# Patient Record
Sex: FEMALE | Race: WHITE | NOT HISPANIC OR LATINO | Employment: OTHER | URBAN - NONMETROPOLITAN AREA
[De-identification: names, ages, dates, MRNs, and addresses within clinical notes are randomized per-mention and may not be internally consistent; named-entity substitution may affect disease eponyms.]

---

## 2017-01-10 ENCOUNTER — TELEPHONE (OUTPATIENT)
Dept: UROLOGY | Facility: CLINIC | Age: 67
End: 2017-01-10

## 2017-01-10 DIAGNOSIS — N20.1 CALCULUS OF URETER: Primary | ICD-10-CM

## 2017-01-10 NOTE — TELEPHONE ENCOUNTER
----- Message from Becky Beard sent at 1/10/2017  9:39 AM CST -----  Contact: OPAL GERBER  Needs order for KUB on 2/16/17        Waiting on Dr. Ferreira to sign off on order. ML

## 2017-02-16 ENCOUNTER — OFFICE VISIT (OUTPATIENT)
Dept: UROLOGY | Facility: CLINIC | Age: 67
End: 2017-02-16

## 2017-02-16 ENCOUNTER — HOSPITAL ENCOUNTER (OUTPATIENT)
Dept: GENERAL RADIOLOGY | Facility: HOSPITAL | Age: 67
Discharge: HOME | End: 2017-02-16
Attending: UROLOGY | Admitting: UROLOGY

## 2017-02-16 VITALS
TEMPERATURE: 98.4 F | BODY MASS INDEX: 34.56 KG/M2 | WEIGHT: 187.8 LBS | HEIGHT: 62 IN | SYSTOLIC BLOOD PRESSURE: 150 MMHG | DIASTOLIC BLOOD PRESSURE: 96 MMHG

## 2017-02-16 DIAGNOSIS — N20.1 CALCULUS OF URETER: ICD-10-CM

## 2017-02-16 DIAGNOSIS — N20.1 CALCULUS OF URETER: Primary | ICD-10-CM

## 2017-02-16 DIAGNOSIS — R31.29 MICROSCOPIC HEMATURIA: ICD-10-CM

## 2017-02-16 LAB
BILIRUB BLD-MCNC: NEGATIVE MG/DL
CLARITY, POC: CLEAR
COLOR UR: YELLOW
GLUCOSE UR STRIP-MCNC: NEGATIVE MG/DL
KETONES UR QL: NEGATIVE
LEUKOCYTE EST, POC: NEGATIVE
NITRITE UR-MCNC: NEGATIVE MG/ML
PH UR: 7 [PH] (ref 5–8)
PROT UR STRIP-MCNC: NEGATIVE MG/DL
RBC # UR STRIP: NEGATIVE /UL
SP GR UR: 1.01 (ref 1–1.03)
UROBILINOGEN UR QL: NORMAL

## 2017-02-16 PROCEDURE — 74000 HC ABDOMEN KUB: CPT

## 2017-02-16 PROCEDURE — 99213 OFFICE O/P EST LOW 20 MIN: CPT | Performed by: UROLOGY

## 2017-02-16 PROCEDURE — 81003 URINALYSIS AUTO W/O SCOPE: CPT | Performed by: UROLOGY

## 2017-02-16 RX ORDER — NIACIN 500 MG
500 TABLET ORAL NIGHTLY
COMMUNITY

## 2017-02-16 RX ORDER — MECLIZINE HCL 25MG 25 MG/1
25 TABLET, CHEWABLE ORAL 3 TIMES DAILY PRN
Status: ON HOLD | COMMUNITY
End: 2019-09-19

## 2017-02-16 RX ORDER — FEXOFENADINE HCL 180 MG/1
180 TABLET ORAL DAILY
COMMUNITY

## 2017-02-16 RX ORDER — CHLORAL HYDRATE 500 MG
600 CAPSULE ORAL
Status: ON HOLD | COMMUNITY
End: 2019-09-19

## 2017-02-16 RX ORDER — ASPIRIN 81 MG/1
81 TABLET ORAL DAILY
COMMUNITY

## 2017-02-16 NOTE — PROGRESS NOTES
Subjective    Ms. Garcia is 67 y.o. female    CHIEF COMPLAINT: Stones    The patient comes back today with her 2 year follow-up of stones she is had actually no symptoms since we have seen her she is had no flank pain no gross hematuria no burning stinging  frequency and burning suggestive of stones he did get a KUB today  KUB independent review    A KUB is available for me to review today.  The image is inspected for a bowel gas pattern and the general bone structure of the spine and pelvis. The kidneys are then inspected closely.  Renal outline is noted if identifiable. The kidney, collecting system, and anticipated path of the ureter are examined for calcifications including those in the true pelvis.  This film reveals:    On the right there are no calcificaitons seen in the kidney or the expected course of the ureter. .    On the left there are no calcificaitons seen in the kidney or the expected course of the ureter.     She also has a history of gross hematuria and also the microscopic hematuria she has had a full workup in the past with no evidence of any disease she is urine today is perfectly clear and she is had no further gross hematuria etc.        History of Present Illness      The following portions of the patient's history were reviewed and updated as appropriate: allergies, current medications, past family history, past medical history, past social history, past surgical history and problem list.    Review of Systems   Constitutional: Negative for chills and fever.   Gastrointestinal: Negative for abdominal pain, anal bleeding and blood in stool.   Genitourinary: Positive for urgency. Negative for difficulty urinating, flank pain, frequency, hematuria and pelvic pain.         Current Outpatient Prescriptions:   •  aspirin 81 MG EC tablet, Take 81 mg by mouth Daily., Disp: , Rfl:   •  Calcium Carbonate (CALCIUM 600 PO), Take  by mouth., Disp: , Rfl:   •  Cholecalciferol (VITAMIN D3) 5000 UNITS  "capsule capsule, Take 5,000 Units by mouth Daily., Disp: , Rfl:   •  fexofenadine (ALLEGRA) 180 MG tablet, Take 180 mg by mouth Daily., Disp: , Rfl:   •  meclizine 25 MG chewable tablet chewable tablet, Chew 25 mg 3 (Three) Times a Day As Needed., Disp: , Rfl:   •  niacin 500 MG tablet, Take 500 mg by mouth Every Night., Disp: , Rfl:   •  Omega-3 Fatty Acids (FISH OIL) 1000 MG capsule capsule, Take  by mouth Daily With Breakfast., Disp: , Rfl:   •  Probiotic Product (PROBIOTIC DAILY PO), Take  by mouth., Disp: , Rfl:     Past Medical History   Diagnosis Date   • Calculus of ureter    • Gross hematuria    • Microscopic hematuria        Past Surgical History   Procedure Laterality Date   • Hysterectomy     • Oophorectomy     • Tubal abdominal ligation         Social History     Social History   • Marital status: Unknown     Spouse name: N/A   • Number of children: N/A   • Years of education: N/A     Social History Main Topics   • Smoking status: Never Smoker   • Smokeless tobacco: None   • Alcohol use No   • Drug use: None   • Sexual activity: Not Asked     Other Topics Concern   • None     Social History Narrative   • None       Family History   Problem Relation Age of Onset   • No Known Problems Father    • No Known Problems Mother        Objective    Visit Vitals   • /96   • Temp 98.4 °F (36.9 °C)   • Ht 62\" (157.5 cm)   • Wt 187 lb 12.8 oz (85.2 kg)   • BMI 34.35 kg/m2       Physical Exam      Admission on 09/18/2014, Discharged on 09/20/2014   Component Date Value Ref Range Status   • WBC 09/11/2014 8.12  4.80 - 10.80 K/mcL Final   • RBC 09/11/2014 4.58  4.20 - 5.40 M/mcL Final   • Hemoglobin 09/11/2014 13.9  12.0 - 16.0 g/dL Final   • Hematocrit 09/11/2014 41.6  37.0 - 47.0 % Final   • MCV 09/11/2014 90.8  82.0 - 98.0 fL Final   • MCH 09/11/2014 30.3  28.0 - 32.0 pg Final   • MCHC 09/11/2014 33.4  33.0 - 36.0 gm/dL Final   • RDW-SD 09/11/2014 42.6  40.0 - 54.0 fL Final   • RDW-CV 09/11/2014 12.9  12.0 - " 15.0 % Final   • RDW 09/11/2014 12.9  12 - 15 % Final   • Platelets 09/11/2014 285  130 - 400 K/mcL Final   • Sodium 09/11/2014 140  135 - 145 mmol/L Final   • Potassium 09/11/2014 3.9  3.5 - 5.3 mmol/L Final   • Chloride 09/11/2014 102  98 - 110 mmol/L Final   • CO2 09/11/2014 29  24 - 31 mmol/L Final   • Glucose 09/11/2014 103* 70 - 100 mg/dL Final   • BUN 09/11/2014 9  5 - 21 mg/dL Final   • Creatinine 09/11/2014 0.75  0.5 - 1.4 mg/dL Final   • Calcium 09/11/2014 9.3  8.4 - 10.4 mg/dL Final   • Total Protein 09/11/2014 7.9  6.3 - 8.7 g/dL Final   • Albumin 09/11/2014 4.4  3.5 - 5.0 g/dL Final   • Total Bilirubin 09/11/2014 0.7  0.1 - 1.0 mg/dL Final   • Alkaline Phosphatase 09/11/2014 101  24 - 120 Units/L Final   • AST (SGOT) 09/11/2014 36  7 - 45 Units/L Final   • ALT (SGPT) 09/11/2014 33  0 - 54 Units/L Final   • Anion Gap 09/11/2014 10  4 - 13 mmol/L Final   • eGFR 09/11/2014 >60  ml/min/1.732 Final    Comment: DF by IF @ 09/11/2014 13:48  GFR Normal                            >60  Chronic Kidney Disease          <60  Kidney Failure                         <15     • ABORh 09/18/2014 B Rh Positive   Final   • Antibody Screen 09/18/2014 Negative   Final   • Glucose 09/18/2014 118* 70 - 100 mg/dL Final   • WBC 09/19/2014 12.57* 4.80 - 10.80 K/mcL Final   • RBC 09/19/2014 3.62* 4.20 - 5.40 M/mcL Final   • Hemoglobin 09/19/2014 11.2* 12.0 - 16.0 g/dL Final   • Hematocrit 09/19/2014 32.2* 37.0 - 47.0 % Final   • MCV 09/19/2014 89.0  82.0 - 98.0 fL Final   • MCH 09/19/2014 30.9  28.0 - 32.0 pg Final   • MCHC 09/19/2014 34.8  33.0 - 36.0 gm/dL Final   • RDW-SD 09/19/2014 39.9* 40.0 - 54.0 fL Final   • RDW-CV 09/19/2014 12.8  12.0 - 15.0 % Final   • RDW 09/19/2014 12.8  12 - 15 % Final   • Platelets 09/19/2014 276  130 - 400 K/mcL Final   • Glucose 09/18/2014 167* 70 - 100 mg/dL Final       Results for orders placed or performed in visit on 02/16/17   POC Urinalysis Dipstick, Automated   Result Value Ref Range     Color Yellow Yellow, Straw, Dark Yellow, [place]    Clarity, UA Clear Clear    Glucose, UA Negative Negative, 1000 mg/dL (3+) mg/dL    Bilirubin Negative Negative    Ketones, UA Negative Negative    Specific Gravity  1.015 1.005 - 1.030    Blood, UA Negative Negative    pH, Urine 7.0 5.0 - 8.0    Protein, POC Negative Negative mg/dL    Urobilinogen, UA Normal Normal    Leukocytes Negative Negative    Nitrite, UA Negative Negative       Assessment and Plan    Judi was seen today for calculus of ureter.    Diagnoses and all orders for this visit:    Calculus of ureter  -     POC Urinalysis Dipstick, Automated    Microscopic hematuria  Plan--I again discussed stones with the patient dietary structures she had stones 30 years apart so were hopeful that she will have another one another 30 years and we both last about that.    She should have any pain or symptoms she will let us know.    As far as the hematuria that seems resolved as well if he should notice any she will call otherwise we will just see her back when necessary    EMR Dragon/Transcription disclaimer:  Much of this encounter note is an electronic transcription/translation of spoken language to printed text. The electronic translation of spoken language may permit erroneous, or at times, nonsensical words or phrases to be inadvertently transcribed; although I have reviewed the note for such errors, some may still exist.

## 2017-02-27 ENCOUNTER — OFFICE VISIT (OUTPATIENT)
Dept: RETAIL CLINIC | Facility: CLINIC | Age: 67
End: 2017-02-27

## 2017-02-27 VITALS
WEIGHT: 183.6 LBS | DIASTOLIC BLOOD PRESSURE: 80 MMHG | RESPIRATION RATE: 18 BRPM | HEART RATE: 100 BPM | HEIGHT: 62 IN | TEMPERATURE: 98.7 F | SYSTOLIC BLOOD PRESSURE: 148 MMHG | OXYGEN SATURATION: 97 % | BODY MASS INDEX: 33.79 KG/M2

## 2017-02-27 DIAGNOSIS — J01.00 ACUTE MAXILLARY SINUSITIS, RECURRENCE NOT SPECIFIED: Primary | ICD-10-CM

## 2017-02-27 PROCEDURE — 99213 OFFICE O/P EST LOW 20 MIN: CPT | Performed by: NURSE PRACTITIONER

## 2017-02-27 RX ORDER — AMOXICILLIN AND CLAVULANATE POTASSIUM 875; 125 MG/1; MG/1
1 TABLET, FILM COATED ORAL 2 TIMES DAILY
Qty: 14 TABLET | Refills: 0 | Status: SHIPPED | OUTPATIENT
Start: 2017-02-27 | End: 2017-03-06

## 2017-02-27 RX ORDER — FLUTICASONE PROPIONATE 50 MCG
SPRAY, SUSPENSION (ML) NASAL
Qty: 1 BOTTLE | Refills: 0 | Status: SHIPPED | OUTPATIENT
Start: 2017-02-27

## 2017-02-27 NOTE — PROGRESS NOTES
"  Chief Complaint   Patient presents with   • Nasal Congestion     X 2 weeks-has been getting worse   • Cough     Has been coughing up \"yellow stuff\"    • Sinusitis     Sinus pain and pressure started last night   • Fever     Low-grade intermittently; was 99.9 kast night     Subjective   Judi Garcia is a 67 y.o. female who presents to the clinic today with complaints of sinus pain and pressure which started last night. She has also had a low grade fever which started last night. She has had worsening nasal congestion for two weeks.  She had had a congested cough with yellow sputum for the last week. She has a history of allergies and sinus problems.    She has tried sinus and allergy meds, decongestant, saline spray.     She is followed by Dr. Cortes.      The following portions of the patient's history were reviewed and updated as appropriate: allergies, past family history, past medical history, past social history, past surgical history and problem list.    Current Outpatient Prescriptions:   •  aspirin 81 MG EC tablet, Take 81 mg by mouth Daily., Disp: , Rfl:   •  Calcium Carbonate (CALCIUM 600 PO), Take  by mouth., Disp: , Rfl:   •  Cholecalciferol (VITAMIN D3) 5000 UNITS capsule capsule, Take 5,000 Units by mouth Daily., Disp: , Rfl:   •  fexofenadine (ALLEGRA) 180 MG tablet, Take 180 mg by mouth Daily., Disp: , Rfl:   •  meclizine 25 MG chewable tablet chewable tablet, Chew 25 mg 3 (Three) Times a Day As Needed., Disp: , Rfl:   •  niacin 500 MG tablet, Take 500 mg by mouth Every Night., Disp: , Rfl:   •  Omega-3 Fatty Acids (FISH OIL) 1000 MG capsule capsule, Take  by mouth Daily With Breakfast., Disp: , Rfl:   •  Probiotic Product (PROBIOTIC DAILY PO), Take  by mouth., Disp: , Rfl:       Allergies:  Biaxin [clarithromycin] and Codeine     Past Medical History   Diagnosis Date   • Allergic    • Calculus of ureter    • Gross hematuria    • Microscopic hematuria      Past Surgical History   Procedure " "Laterality Date   • Hysterectomy     • Oophorectomy     • Tubal abdominal ligation       Family History   Problem Relation Age of Onset   • No Known Problems Father    • No Known Problems Mother      Social History   Substance Use Topics   • Smoking status: Never Smoker   • Smokeless tobacco: Never Used   • Alcohol use No       Review of Systems  Review of Systems   Constitutional: Positive for fatigue (a little) and fever (low grade). Negative for chills.   HENT: Positive for rhinorrhea (mostly clear), sinus pressure and sneezing. Ear pain: sometimes itch or feel full, no pain.    Eyes: Positive for discharge (watery at times). Negative for photophobia, pain, redness and visual disturbance.   Respiratory: Positive for cough. Negative for shortness of breath and wheezing.    Neurological: Positive for headaches (frontal).       Objective   Visit Vitals   • /80 (BP Location: Right arm, Patient Position: Sitting, Cuff Size: Adult)   • Pulse 100   • Temp 98.7 °F (37.1 °C) (Oral)   • Resp 18   • Ht 62\" (157.5 cm)   • Wt 183 lb 9.6 oz (83.3 kg)   • SpO2 97%   • BMI 33.58 kg/m2     Last 2 weights    02/27/17  1025   Weight: 183 lb 9.6 oz (83.3 kg)       Physical Exam   Constitutional: She is oriented to person, place, and time. Vital signs are normal. She appears well-developed and well-nourished. She is cooperative. She appears ill (mildly). No distress.   HENT:   Head: Normocephalic and atraumatic.   Right Ear: Tympanic membrane, external ear and ear canal normal. Tympanic membrane is not perforated, not erythematous, not retracted and not bulging.   Left Ear: Tympanic membrane, external ear and ear canal normal. Tympanic membrane is not perforated, not erythematous, not retracted and not bulging.   Nose: Mucosal edema present. Right sinus exhibits maxillary sinus tenderness. Right sinus exhibits no frontal sinus tenderness. Left sinus exhibits maxillary sinus tenderness. Left sinus exhibits no frontal sinus " tenderness.   Mouth/Throat: Uvula is midline, oropharynx is clear and moist and mucous membranes are normal. Tonsils are 2+ on the right. Tonsils are 2+ on the left. Tonsillar exudate: tiny 1-2 mm raised red area left tonsil.   Eyes: Conjunctivae, EOM and lids are normal. Pupils are equal, round, and reactive to light.   Neck: Trachea normal and normal range of motion. Neck supple.   Cardiovascular: Normal rate, regular rhythm, S1 normal, S2 normal and normal heart sounds.    Pulmonary/Chest: Effort normal and breath sounds normal. She has no wheezes. She has no rhonchi. She has no rales.   Lymphadenopathy:     She has no cervical adenopathy.   Neurological: She is alert and oriented to person, place, and time. Coordination and gait normal.   Skin: Skin is warm, dry and intact.   Psychiatric: She has a normal mood and affect. Her speech is normal and behavior is normal.       Assessment/Plan     Judi was seen today for nasal congestion, cough, sinusitis and fever.    Diagnoses and all orders for this visit:    Acute maxillary sinusitis, recurrence not specified    Other orders  -     amoxicillin-clavulanate (AUGMENTIN) 875-125 MG per tablet; Take 1 tablet by mouth 2 (Two) Times a Day for 7 days.  -     fluticasone (FLONASE) 50 MCG/ACT nasal spray; 1 spray each nostril twice a day for three days, then daily    If symptoms persist or worsen please see Dr. Cortes sooner than your early April appointment. You have a tiny raised red area left tonsil.  Please ask Dr. Cortes to reexamine this area to make sure it has resolved.  You verbalized understanding.

## 2017-02-27 NOTE — PATIENT INSTRUCTIONS
Sinusitis, Adult  Sinusitis is redness, soreness, and inflammation of the paranasal sinuses. Paranasal sinuses are air pockets within the bones of your face. They are located beneath your eyes, in the middle of your forehead, and above your eyes. In healthy paranasal sinuses, mucus is able to drain out, and air is able to circulate through them by way of your nose. However, when your paranasal sinuses are inflamed, mucus and air can become trapped. This can allow bacteria and other germs to grow and cause infection.  Sinusitis can develop quickly and last only a short time (acute) or continue over a long period (chronic). Sinusitis that lasts for more than 12 weeks is considered chronic.  CAUSES  Causes of sinusitis include:  · Allergies.  · Structural abnormalities, such as displacement of the cartilage that separates your nostrils (deviated septum), which can decrease the air flow through your nose and sinuses and affect sinus drainage.  · Functional abnormalities, such as when the small hairs (cilia) that line your sinuses and help remove mucus do not work properly or are not present.  SIGNS AND SYMPTOMS  Symptoms of acute and chronic sinusitis are the same. The primary symptoms are pain and pressure around the affected sinuses. Other symptoms include:  · Upper toothache.  · Earache.  · Headache.  · Bad breath.  · Decreased sense of smell and taste.  · A cough, which worsens when you are lying flat.  · Fatigue.  · Fever.  · Thick drainage from your nose, which often is green and may contain pus (purulent).  · Swelling and warmth over the affected sinuses.  DIAGNOSIS  Your health care provider will perform a physical exam. During your exam, your health care provider may perform any of the following to help determine if you have acute sinusitis or chronic sinusitis:  · Look in your nose for signs of abnormal growths in your nostrils (nasal polyps).  · Tap over the affected sinus to check for signs of  infection.  · View the inside of your sinuses using an imaging device that has a light attached (endoscope).  If your health care provider suspects that you have chronic sinusitis, one or more of the following tests may be recommended:  · Allergy tests.  · Nasal culture. A sample of mucus is taken from your nose, sent to a lab, and screened for bacteria.  · Nasal cytology. A sample of mucus is taken from your nose and examined by your health care provider to determine if your sinusitis is related to an allergy.  TREATMENT  Most cases of acute sinusitis are related to a viral infection and will resolve on their own within 10 days. Sometimes, medicines are prescribed to help relieve symptoms of both acute and chronic sinusitis. These may include pain medicines, decongestants, nasal steroid sprays, or saline sprays.  However, for sinusitis related to a bacterial infection, your health care provider will prescribe antibiotic medicines. These are medicines that will help kill the bacteria causing the infection.  Rarely, sinusitis is caused by a fungal infection. In these cases, your health care provider will prescribe antifungal medicine.  For some cases of chronic sinusitis, surgery is needed. Generally, these are cases in which sinusitis recurs more than 3 times per year, despite other treatments.  HOME CARE INSTRUCTIONS  · Drink plenty of water. Water helps thin the mucus so your sinuses can drain more easily.  · Use a humidifier.  · Inhale steam 3-4 times a day (for example, sit in the bathroom with the shower running).  · Apply a warm, moist washcloth to your face 3-4 times a day, or as directed by your health care provider.  · Use saline nasal sprays to help moisten and clean your sinuses.  · Take medicines only as directed by your health care provider.  · If you were prescribed either an antibiotic or antifungal medicine, finish it all even if you start to feel better.  SEEK IMMEDIATE MEDICAL CARE IF:  · You have  increasing pain or severe headaches.  · You have nausea, vomiting, or drowsiness.  · You have swelling around your face.  · You have vision problems.  · You have a stiff neck.  · You have difficulty breathing.     This information is not intended to replace advice given to you by your health care provider. Make sure you discuss any questions you have with your health care provider.     Document Released: 12/18/2006 Document Revised: 01/08/2016 Document Reviewed: 10/12/2016  [employer] Interactive Patient Education ©2016 [employer]    Amoxicillin; Clavulanic Acid tablets  What is this medicine?  AMOXICILLIN; CLAVULANIC ACID (a mox i AFIA in; JORGE LUIS cathybrain rosales ic AS id) is a penicillin antibiotic. It is used to treat certain kinds of bacterial infections. It will not work for colds, flu, or other viral infections.  This medicine may be used for other purposes; ask your health care provider or pharmacist if you have questions.  What should I tell my health care provider before I take this medicine?  They need to know if you have any of these conditions:  -bowel disease, like colitis  -kidney disease  -liver disease  -mononucleosis  -an unusual or allergic reaction to amoxicillin, penicillin, cephalosporin, other antibiotics, clavulanic acid, other medicines, foods, dyes, or preservatives  -pregnant or trying to get pregnant  -breast-feeding  How should I use this medicine?  Take this medicine by mouth with a full glass of water. Follow the directions on the prescription label. Take at the start of a meal. Do not crush or chew. If the tablet has a score line, you may cut it in half at the score line for easier swallowing. Take your medicine at regular intervals. Do not take your medicine more often than directed. Take all of your medicine as directed even if you think you are better. Do not skip doses or stop your medicine early.  Talk to your pediatrician regarding the use of this medicine in children. Special care may be  needed.  Overdosage: If you think you have taken too much of this medicine contact a poison control center or emergency room at once.  NOTE: This medicine is only for you. Do not share this medicine with others.  What if I miss a dose?  If you miss a dose, take it as soon as you can. If it is almost time for your next dose, take only that dose. Do not take double or extra doses.  What may interact with this medicine?  -allopurinol  -anticoagulants  -birth control pills  -methotrexate  -probenecid  This list may not describe all possible interactions. Give your health care provider a list of all the medicines, herbs, non-prescription drugs, or dietary supplements you use. Also tell them if you smoke, drink alcohol, or use illegal drugs. Some items may interact with your medicine.  What should I watch for while using this medicine?  Tell your doctor or health care professional if your symptoms do not improve.  Do not treat diarrhea with over the counter products. Contact your doctor if you have diarrhea that lasts more than 2 days or if it is severe and watery.  If you have diabetes, you may get a false-positive result for sugar in your urine. Check with your doctor or health care professional.  Birth control pills may not work properly while you are taking this medicine. Talk to your doctor about using an extra method of birth control.  What side effects may I notice from receiving this medicine?  Side effects that you should report to your doctor or health care professional as soon as possible:  -allergic reactions like skin rash, itching or hives, swelling of the face, lips, or tongue  -breathing problems  -dark urine  -fever or chills, sore throat  -redness, blistering, peeling or loosening of the skin, including inside the mouth  -seizures  -trouble passing urine or change in the amount of urine  -unusual bleeding, bruising  -unusually weak or tired  -white patches or sores in the mouth or throat  Side effects  that usually do not require medical attention (report to your doctor or health care professional if they continue or are bothersome):  -diarrhea  -dizziness  -headache  -nausea, vomiting  -stomach upset  -vaginal or anal irritation  This list may not describe all possible side effects. Call your doctor for medical advice about side effects. You may report side effects to FDA at 1-903-NAZ-7000.  Where should I keep my medicine?  Keep out of the reach of children.  Store at room temperature below 25 degrees C (77 degrees F). Keep container tightly closed. Throw away any unused medicine after the expiration date.  NOTE: This sheet is a summary. It may not cover all possible information. If you have questions about this medicine, talk to your doctor, pharmacist, or health care provider.     © 2016, Elsevier/[employer] Standard. (2009-03-12 12:04:30)    Fluticasone nasal spray  What is this medicine?  FLUTICASONE (floo TIK a sone) is a corticosteroid. This medicine is used to treat the symptoms of allergies like sneezing, itchy red eyes, and itchy, runny, or stuffy nose.  This medicine may be used for other purposes; ask your health care provider or pharmacist if you have questions.  What should I tell my health care provider before I take this medicine?  They need to know if you have any of these conditions:  -infection, like tuberculosis, herpes, or fungal infection  -recent surgery on nose or sinuses  -taking corticosteroid by mouth  -an unusual or allergic reaction to fluticasone, steroids, other medicines, foods, dyes, or preservatives  -pregnant or trying to get pregnant  -breast-feeding  How should I use this medicine?  This medicine is for use in the nose. Follow the directions on your product or prescription label. This medicine works best if used at regular intervals. Do not use more often than directed. Make sure that you are using your nasal spray correctly. After 6 months of daily use without a prescription, talk  to your doctor or health care professional before using it for a longer time. Ask your doctor or health care professional if you have any questions.  Talk to your pediatrician regarding the use of this medicine in children. Special care may be needed. This medicine has been used in children as young as 2 years. After two months of daily use without a prescription in a child, talk to your pediatrician before using it for a longer time.  Overdosage: If you think you have taken too much of this medicine contact a poison control center or emergency room at once.  NOTE: This medicine is only for you. Do not share this medicine with others.  What if I miss a dose?  If you miss a dose, use it as soon as you remember. If it is almost time for your next dose, use only that dose and continue with your regular schedule. Do not use double or extra doses.  What may interact with this medicine?  -ketoconazole  -metyrapone  -some medicines for HIV  -vaccines  This list may not describe all possible interactions. Give your health care provider a list of all the medicines, herbs, non-prescription drugs, or dietary supplements you use. Also tell them if you smoke, drink alcohol, or use illegal drugs. Some items may interact with your medicine.  What should I watch for while using this medicine?  Visit your doctor or health care professional for regular checks on your progress. Some symptoms may improve within 12 hours after starting use. Check with your doctor or health care professional if there is no improvement in your condition after 3 weeks of use.  Do not come in contact with people who have chickenpox or the measles while you are taking this medicine. If you do, call your doctor right away.  What side effects may I notice from receiving this medicine?  Side effects that you should report to your doctor or health care professional as soon as possible:  -allergic reactions like skin rash, itching or hives, swelling of the face,  lips, or tongue  -changes in vision  -flu-like symptoms  -white patches or sores in the mouth or nose  Side effects that usually do not require medical attention (report to your doctor or health care professional if they continue or are bothersome):  -burning or irritation inside the nose or throat  -cough  -headache  -nosebleed  -unusual taste or smell  This list may not describe all possible side effects. Call your doctor for medical advice about side effects. You may report side effects to FDA at 6-476-TUS-4504.  Where should I keep my medicine?  Keep out of the reach of children.  Store at room temperature between 15 and 30 degrees C (59 and 86 degrees F). Throw away any unused medicine after the expiration date.  NOTE: This sheet is a summary. It may not cover all possible information. If you have questions about this medicine, talk to your doctor, pharmacist, or health care provider.     © 2016, Elsevier/Gold Standard. (2015-05-13 09:07:53)    An antibiotic has been prescribed for you that needs to be taken as directed, for full length of treatment. It is recommended that you take a probiotic when taking an antibiotic. Probiotics are found over the counter in pill form and in some yogurt brands, both are recommended.     If symptoms persist or worsen please see Dr. Cortes sooner than your early April appointment. You have a tiny raised red area left tonsil.  Please ask Dr. Cortes to reexamine this area to make sure it has resolved.  You verbalized understanding.

## 2017-05-16 ENCOUNTER — OFFICE VISIT (OUTPATIENT)
Dept: OTOLARYNGOLOGY | Facility: CLINIC | Age: 67
End: 2017-05-16

## 2017-05-16 VITALS
HEART RATE: 79 BPM | TEMPERATURE: 98.8 F | BODY MASS INDEX: 34.04 KG/M2 | HEIGHT: 62 IN | DIASTOLIC BLOOD PRESSURE: 87 MMHG | WEIGHT: 185 LBS | SYSTOLIC BLOOD PRESSURE: 140 MMHG

## 2017-05-16 DIAGNOSIS — D18.00 HEMANGIOMA: Primary | ICD-10-CM

## 2017-05-16 PROCEDURE — 99203 OFFICE O/P NEW LOW 30 MIN: CPT | Performed by: NURSE PRACTITIONER

## 2017-05-16 RX ORDER — BISOPROLOL FUMARATE AND HYDROCHLOROTHIAZIDE 5; 6.25 MG/1; MG/1
TABLET ORAL
COMMUNITY
Start: 2017-05-09

## 2017-08-17 ENCOUNTER — OFFICE VISIT (OUTPATIENT)
Dept: OTOLARYNGOLOGY | Facility: CLINIC | Age: 67
End: 2017-08-17

## 2017-08-17 VITALS
HEIGHT: 62 IN | HEART RATE: 86 BPM | WEIGHT: 185.38 LBS | SYSTOLIC BLOOD PRESSURE: 140 MMHG | DIASTOLIC BLOOD PRESSURE: 72 MMHG | BODY MASS INDEX: 34.11 KG/M2 | TEMPERATURE: 98.8 F

## 2017-08-17 DIAGNOSIS — J34.3 HYPERTROPHY OF INFERIOR NASAL TURBINATE: Primary | ICD-10-CM

## 2017-08-17 DIAGNOSIS — J30.9 ALLERGIC RHINITIS, UNSPECIFIED ALLERGIC RHINITIS TRIGGER, UNSPECIFIED RHINITIS SEASONALITY: ICD-10-CM

## 2017-08-17 DIAGNOSIS — D10.4 BENIGN NEOPLASM OF TONSIL: ICD-10-CM

## 2017-08-17 PROCEDURE — 31575 DIAGNOSTIC LARYNGOSCOPY: CPT | Performed by: NURSE PRACTITIONER

## 2017-08-17 PROCEDURE — 99214 OFFICE O/P EST MOD 30 MIN: CPT | Performed by: NURSE PRACTITIONER

## 2017-08-17 RX ORDER — MONTELUKAST SODIUM 10 MG/1
10 TABLET ORAL DAILY
Qty: 30 TABLET | Refills: 5 | Status: SHIPPED | OUTPATIENT
Start: 2017-08-17 | End: 2017-09-16

## 2017-08-17 RX ORDER — AZELASTINE 1 MG/ML
2 SPRAY, METERED NASAL 2 TIMES DAILY
Qty: 30 ML | Refills: 5 | Status: SHIPPED | OUTPATIENT
Start: 2017-08-17

## 2017-08-17 NOTE — PROGRESS NOTES
OPERATIVE NOTE:    PROCEDURE:   Flexible Fiberoptic Laryngoscopy    ANESTHESIA:  None    REASON FOR PROCEDURE:  Procedure was recommend for suspicious clinical behavior unresponsive to medical management.  Risks, benefits and alternatives were discussed.      DETAILS of OPERATION:  The patient was seated in the exam chair.  A flexible fiberoptic laryngoscopy was performed through the oral cavity.  The scope was introduced into the oral cavity and directed to the level of the glottis, examining the structures of the oropharynx, base of tongue, vallecula, supraglottic larynx, glottic larynx, and hypopharynx.      FINDINGS:  Mucosal surfaces:   The mucosal surfaces demonstrated thickened mucous    Base of tongue:  The base of tongue was found to have no mass or lesion.    Epiglottis:  The epiglottis was found to have no mass or lesion.    Aryepligottic fold:  The AE folds were found to have no mass or lesion.    False Vocal Fold:  The false cords were found to have no mass or lesion.    True Vocal Cord:  The true vocal cords were found to have no mass or lesion.    Arytenoid:    The arytenoids were found to have no mass or lesions.    Hypopharynx:  The hypopharynx was found to have no mass or lesion.    The patient tolerated procedure well.

## 2019-09-05 ENCOUNTER — OFFICE VISIT (OUTPATIENT)
Dept: GASTROENTEROLOGY | Facility: CLINIC | Age: 69
End: 2019-09-05

## 2019-09-05 VITALS
DIASTOLIC BLOOD PRESSURE: 80 MMHG | TEMPERATURE: 97.1 F | SYSTOLIC BLOOD PRESSURE: 130 MMHG | HEIGHT: 62 IN | HEART RATE: 76 BPM | OXYGEN SATURATION: 96 % | BODY MASS INDEX: 32.57 KG/M2 | WEIGHT: 177 LBS

## 2019-09-05 DIAGNOSIS — Z86.010 HISTORY OF COLON POLYPS: Primary | ICD-10-CM

## 2019-09-05 DIAGNOSIS — Z80.0 FAMILY HISTORY OF COLON CANCER: ICD-10-CM

## 2019-09-05 PROCEDURE — S0260 H&P FOR SURGERY: HCPCS | Performed by: NURSE PRACTITIONER

## 2019-09-05 RX ORDER — FAMOTIDINE 20 MG/1
20 TABLET, FILM COATED ORAL 2 TIMES DAILY
COMMUNITY

## 2019-09-05 NOTE — H&P (VIEW-ONLY)
Chief Complaint   Patient presents with   • Colonoscopy     9-5-14 had colon diverticulosis       PCP: Gm Cortes MD  REFER: No ref. provider found    Subjective     HPI    Judi Garcia is a 69 y.o. female who presents to office for preventative maintenance.  There is  a personal history of colon polyps.  There is not a history of colon cancer.  She does not have complaints of nausea/vomiting, change in bowels, weight loss, no BRBPR, no melena.  There is a family history of colon cancer-mother diagnosis age of 82 .  There is not a family history of colon polyps.  She last colonoscopy-2014 .  Bowels do move on regular basis.    CScope (Dr Finley) 2014-diverticulosis  CScope (Dr Finley) 2011-mixed tubulo-hyperplastic polyp      Past Medical History:   Diagnosis Date   • Allergic    • Calculus of ureter    • Diabetes mellitus, type II (CMS/HCC)    • Gross hematuria    • Hemangioma    • Hyperlipemia    • Microscopic hematuria      Past Surgical History:   Procedure Laterality Date   • CAROTID ENDARTERECTOMY Left 2012   • COLONOSCOPY  09/05/2014    diverticulosis   • CYSTOSCOPY W/ LITHOLAPAXY / [facility]     • HYSTERECTOMY     • OOPHORECTOMY Bilateral 2014   • PAROTIDECTOMY  2012    Dr. Rodriguez   • TUBAL ABDOMINAL LIGATION       Outpatient Medications Marked as Taking for the 9/5/19 encounter (Office Visit) with Ron Salcido APRN   Medication Sig Dispense Refill   • aspirin 81 MG EC tablet Take 81 mg by mouth Daily.     • azelastine (ASTELIN) 0.1 % nasal spray 2 sprays into each nostril 2 (Two) Times a Day. Use in each nostril as directed 30 mL 5   • bisoprolol-hydrochlorothiazide (ZIAC) 5-6.25 MG per tablet      • Calcium Carbonate (CALCIUM 600 PO) Take  by mouth.     • Cholecalciferol (VITAMIN D3) 5000 UNITS capsule capsule Take 5,000 Units by mouth Daily.     • famotidine (PEPCID) 20 MG tablet Take 20 mg by mouth 2 (Two) Times a Day.     • fexofenadine (ALLEGRA) 180 MG tablet Take 180 mg by mouth Daily.      • fluticasone (FLONASE) 50 MCG/ACT nasal spray 1 spray each nostril twice a day for three days, then daily 1 bottle 0   • niacin 500 MG tablet Take 500 mg by mouth Every Night.     • Probiotic Product (PROBIOTIC DAILY PO) Take  by mouth.       Allergies   Allergen Reactions   • Biaxin [Clarithromycin]      Throat swelling       • Fosamax [Alendronate]      Throat Swelling   • Codeine      Headache     Social History     Socioeconomic History   • Marital status: Unknown     Spouse name: Not on file   • Number of children: Not on file   • Years of education: Not on file   • Highest education level: Not on file   Tobacco Use   • Smoking status: Never Smoker   • Smokeless tobacco: Never Used   Substance and Sexual Activity   • Alcohol use: No   • Drug use: No     Family History   Problem Relation Age of Onset   • No Known Problems Father    • Colon cancer Mother    • Colon polyps Mother    • Esophageal cancer Neg Hx      Review of Systems   Constitutional: Negative for fatigue, fever and unexpected weight change.   HENT: Negative for hearing loss, sore throat and voice change.    Eyes: Negative for visual disturbance.   Respiratory: Negative for cough, shortness of breath and wheezing.    Cardiovascular: Negative for chest pain and palpitations.   Gastrointestinal: Negative for abdominal pain, blood in stool and vomiting.   Endocrine: Negative for polydipsia and polyuria.   Genitourinary: Negative for difficulty urinating, dysuria, hematuria and urgency.   Musculoskeletal: Negative for joint swelling and myalgias.   Skin: Negative for color change, rash and wound.   Neurological: Negative for dizziness, tremors, seizures and syncope.   Hematological: Does not bruise/bleed easily.   Psychiatric/Behavioral: Negative for agitation and confusion. The patient is not nervous/anxious.      Objective   Vitals:    09/05/19 0918   BP: 130/80   Pulse: 76   Temp: 97.1 °F (36.2 °C)   SpO2: 96%     Physical Exam   Constitutional:  She is oriented to person, place, and time. She appears well-developed and well-nourished. She is cooperative.   HENT:   Head: Normocephalic and atraumatic.   Eyes: Conjunctivae are normal. Pupils are equal, round, and reactive to light. No scleral icterus.   Neck: Normal range of motion. Neck supple. No JVD present. No thyroid mass and no thyromegaly present.   Cardiovascular: Normal rate, regular rhythm and normal heart sounds. Exam reveals no gallop and no friction rub.   No murmur heard.  Pulmonary/Chest: Effort normal and breath sounds normal. No accessory muscle usage. No respiratory distress. She has no wheezes. She has no rales.   Abdominal: Soft. Normal appearance and bowel sounds are normal. She exhibits no distension, no ascites and no mass. There is no hepatosplenomegaly. There is no tenderness. There is no rebound and no guarding.   Musculoskeletal: Normal range of motion. She exhibits no edema or tenderness.     Vascular Status -  Her right foot exhibits normal foot vasculature  and no edema. Her left foot exhibits normal foot vasculature  and no edema.  Lymphadenopathy:     She has no cervical adenopathy.   Neurological: She is alert and oriented to person, place, and time. She has normal strength. Gait normal.   Skin: Skin is warm, dry and intact. No rash noted.     Imaging Results (most recent)     None        Body mass index is 32.37 kg/m².    Assessment/Plan   Judi was seen today for colonoscopy.    Diagnoses and all orders for this visit:    History of colon polyps  -     Case Request; Standing  -     Implement Anesthesia Orders Day of Procedure; Standing  -     Obtain Informed Consent; Standing  -     Case Request    Family history of colon cancer  Comments:  mother    Other orders  -     polyethylene glycol (GoLYTELY) 236 g solution; Take 3,785 mL by mouth 1 (One) Time for 1 dose. Take as directed      COLONOSCOPY WITH ANESTHESIA (N/A)    Advised pt to stop ASA, use of NSAIDs, Fish Oil, and  MV 5 days prior to procedure, per Dr Finley protocol.  Tylenol based products are ok to take.  Pt verbalized understanding.      All risks, benefits, alternatives, and indications of colonoscopy procedure have been discussed with the patient. Risks to include perforation of the colon requiring possible surgery or colostomy, risk of bleeding from biopsies or removal of colon tissue, possibility of missing a colon polyp or cancer, or adverse drug reaction.  Benefits to include the diagnosis and management of disease of the colon and rectum. Alternatives to include barium enema, radiographic evaluation, lab testing or no intervention. She verbalizes understanding and agrees.     Patient's Body mass index is 32.37 kg/m². BMI is above normal parameters. Recommendations include: no follow up.      There are no Patient Instructions on file for this visit.

## 2019-09-19 ENCOUNTER — ANESTHESIA (OUTPATIENT)
Dept: GASTROENTEROLOGY | Facility: HOSPITAL | Age: 69
End: 2019-09-19

## 2019-09-19 ENCOUNTER — ANESTHESIA EVENT (OUTPATIENT)
Dept: GASTROENTEROLOGY | Facility: HOSPITAL | Age: 69
End: 2019-09-19

## 2019-09-19 ENCOUNTER — HOSPITAL ENCOUNTER (OUTPATIENT)
Facility: HOSPITAL | Age: 69
Setting detail: HOSPITAL OUTPATIENT SURGERY
Discharge: HOME | End: 2019-09-19
Attending: INTERNAL MEDICINE | Admitting: INTERNAL MEDICINE

## 2019-09-19 VITALS
OXYGEN SATURATION: 95 % | TEMPERATURE: 97.8 F | HEIGHT: 62 IN | DIASTOLIC BLOOD PRESSURE: 65 MMHG | RESPIRATION RATE: 14 BRPM | WEIGHT: 174 LBS | BODY MASS INDEX: 32.02 KG/M2 | HEART RATE: 85 BPM | SYSTOLIC BLOOD PRESSURE: 130 MMHG

## 2019-09-19 DIAGNOSIS — Z86.010 HISTORY OF COLON POLYPS: ICD-10-CM

## 2019-09-19 LAB — GLUCOSE BLDC GLUCOMTR-MCNC: 115 MG/DL (ref 70–130)

## 2019-09-19 PROCEDURE — 45385 COLONOSCOPY W/LESION REMOVAL: CPT | Performed by: INTERNAL MEDICINE

## 2019-09-19 PROCEDURE — 25010000002 PROPOFOL 10 MG/ML EMULSION: Performed by: NURSE ANESTHETIST, CERTIFIED REGISTERED

## 2019-09-19 PROCEDURE — 82962 GLUCOSE BLOOD TEST: CPT

## 2019-09-19 RX ORDER — SODIUM CHLORIDE 0.9 % (FLUSH) 0.9 %
3-10 SYRINGE (ML) INJECTION AS NEEDED
Status: CANCELLED | OUTPATIENT
Start: 2019-09-19

## 2019-09-19 RX ORDER — SODIUM CHLORIDE 0.9 % (FLUSH) 0.9 %
10 SYRINGE (ML) INJECTION AS NEEDED
Status: DISCONTINUED | OUTPATIENT
Start: 2019-09-19 | End: 2019-09-19 | Stop reason: HOSPADM

## 2019-09-19 RX ORDER — PROPOFOL 10 MG/ML
VIAL (ML) INTRAVENOUS AS NEEDED
Status: DISCONTINUED | OUTPATIENT
Start: 2019-09-19 | End: 2019-09-19 | Stop reason: SURG

## 2019-09-19 RX ORDER — SODIUM CHLORIDE 9 MG/ML
100 INJECTION, SOLUTION INTRAVENOUS CONTINUOUS
Status: CANCELLED | OUTPATIENT
Start: 2019-09-19

## 2019-09-19 RX ORDER — SODIUM CHLORIDE 0.9 % (FLUSH) 0.9 %
3 SYRINGE (ML) INJECTION EVERY 12 HOURS SCHEDULED
Status: CANCELLED | OUTPATIENT
Start: 2019-09-19

## 2019-09-19 RX ORDER — SODIUM CHLORIDE 9 MG/ML
500 INJECTION, SOLUTION INTRAVENOUS CONTINUOUS PRN
Status: DISCONTINUED | OUTPATIENT
Start: 2019-09-19 | End: 2019-09-19 | Stop reason: HOSPADM

## 2019-09-19 RX ADMIN — PROPOFOL 100 MG: 10 INJECTION, EMULSION INTRAVENOUS at 07:36

## 2019-09-19 RX ADMIN — PROPOFOL 100 MG: 10 INJECTION, EMULSION INTRAVENOUS at 07:32

## 2019-09-19 RX ADMIN — SODIUM CHLORIDE 500 ML: 0.9 INJECTION, SOLUTION INTRAVENOUS at 07:10

## 2019-09-19 RX ADMIN — PROPOFOL 30 MG: 10 INJECTION, EMULSION INTRAVENOUS at 07:40

## 2019-09-19 NOTE — ANESTHESIA PREPROCEDURE EVALUATION
Anesthesia Evaluation     no history of anesthetic complications:  NPO Solid Status: > 8 hours  NPO Liquid Status: > 2 hours           Airway   Mallampati: II  TM distance: >3 FB  Neck ROM: full  Dental    (+) partials and upper dentures        Pulmonary - normal exam    breath sounds clear to auscultation  (-) asthma, recent URI, sleep apnea, not a smoker  Cardiovascular - normal exam  Exercise tolerance: good (4-7 METS)    Rhythm: regular  Rate: normal    (+) hypertension, hyperlipidemia,   (-) pacemaker, past MI, angina, cardiac stents, CABG      Neuro/Psych  (-) seizures, TIA, CVA  GI/Hepatic/Renal/Endo    (+) obesity,  GERD,  diabetes mellitus,   (-) liver disease, no renal disease, hypothyroidism, hyperthyroidism    Musculoskeletal     Abdominal    Substance History      OB/GYN          Other                        Anesthesia Plan    ASA 3     MAC     intravenous induction   Anesthetic plan, all risks, benefits, and alternatives have been provided, discussed and informed consent has been obtained with: patient.

## 2019-09-19 NOTE — ANESTHESIA POSTPROCEDURE EVALUATION
Patient: Judi Garcia    Procedure Summary     Date:  09/19/19 Room / Location:   [facility] /  [facility]    Anesthesia Start:  0729 Anesthesia Stop:  0745    Procedure:  COLONOSCOPY WITH ANESTHESIA (N/A ) Diagnosis:       History of colon polyps      (History of colon polyps [Z86.010])    Surgeon:  Jose Finley DO Provider:  Lenny Eldridge CRNA    Anesthesia Type:  MAC ASA Status:  3          Anesthesia Type: MAC  Last vitals  BP   166/70 (09/19/19 0653)   Temp   97.8 °F (36.6 °C) (09/19/19 0653)   Pulse   104 (09/19/19 0653)   Resp   20 (09/19/19 0653)     SpO2   93 % (09/19/19 0653)     Post Anesthesia Care and Evaluation    Patient location during evaluation: PHASE II  Patient participation: complete - patient participated  Level of consciousness: awake and alert  Pain management: adequate  Airway patency: patent  Anesthetic complications: No anesthetic complications    Cardiovascular status: acceptable  Respiratory status: acceptable  Hydration status: acceptable

## 2019-09-20 ENCOUNTER — TELEPHONE (OUTPATIENT)
Dept: GASTROENTEROLOGY | Facility: CLINIC | Age: 69
End: 2019-09-20

## 2021-01-13 ENCOUNTER — OFFICE VISIT (OUTPATIENT)
Age: 71
End: 2021-01-13

## 2021-01-13 VITALS — TEMPERATURE: 97.5 F | OXYGEN SATURATION: 96 % | HEART RATE: 76 BPM

## 2021-01-13 DIAGNOSIS — Z11.59 SCREENING FOR VIRAL DISEASE: Primary | ICD-10-CM

## 2021-01-13 PROCEDURE — 99999 PR OFFICE/OUTPT VISIT,PROCEDURE ONLY: CPT | Performed by: NURSE PRACTITIONER

## 2021-01-14 LAB — SARS-COV-2, NAA: NOT DETECTED

## 2021-01-27 ENCOUNTER — OFFICE VISIT (OUTPATIENT)
Age: 71
End: 2021-01-27

## 2021-01-27 VITALS — TEMPERATURE: 97.8 F | HEART RATE: 72 BPM | OXYGEN SATURATION: 94 %

## 2021-01-27 DIAGNOSIS — Z11.59 SCREENING FOR VIRAL DISEASE: Primary | ICD-10-CM

## 2021-01-27 PROCEDURE — 99999 PR OFFICE/OUTPT VISIT,PROCEDURE ONLY: CPT | Performed by: NURSE PRACTITIONER

## 2021-01-28 LAB — SARS-COV-2, NAA: NOT DETECTED

## 2021-05-04 ENCOUNTER — OFFICE VISIT (OUTPATIENT)
Age: 71
End: 2021-05-04

## 2021-05-04 DIAGNOSIS — Z11.59 SCREENING FOR VIRAL DISEASE: Primary | ICD-10-CM

## 2021-05-04 PROCEDURE — 99999 PR OFFICE/OUTPT VISIT,PROCEDURE ONLY: CPT | Performed by: NURSE PRACTITIONER

## 2021-05-06 LAB — SARS-COV-2, NAA: DETECTED

## 2021-05-09 ENCOUNTER — APPOINTMENT (OUTPATIENT)
Dept: GENERAL RADIOLOGY | Age: 71
DRG: 177 | End: 2021-05-09
Payer: MEDICARE

## 2021-05-09 ENCOUNTER — HOSPITAL ENCOUNTER (INPATIENT)
Age: 71
LOS: 6 days | Discharge: HOME HEALTH | DRG: 177 | End: 2021-05-15
Attending: EMERGENCY MEDICINE | Admitting: FAMILY MEDICINE
Payer: MEDICARE

## 2021-05-09 DIAGNOSIS — U07.1 COVID-19: ICD-10-CM

## 2021-05-09 DIAGNOSIS — J96.01 ACUTE RESPIRATORY FAILURE WITH HYPOXIA (HCC): Primary | ICD-10-CM

## 2021-05-09 DIAGNOSIS — F41.8 ANXIETY ABOUT HEALTH: ICD-10-CM

## 2021-05-09 PROBLEM — I10 ESSENTIAL HYPERTENSION: Status: ACTIVE | Noted: 2021-05-09

## 2021-05-09 PROBLEM — A41.9 SEPSIS (HCC): Status: ACTIVE | Noted: 2021-05-09

## 2021-05-09 PROBLEM — J12.82 PNEUMONIA DUE TO COVID-19 VIRUS: Status: ACTIVE | Noted: 2021-05-09

## 2021-05-09 PROBLEM — E87.6 HYPOKALEMIA: Status: ACTIVE | Noted: 2021-05-09

## 2021-05-09 PROBLEM — E87.1 HYPONATREMIA: Status: ACTIVE | Noted: 2021-05-09

## 2021-05-09 PROBLEM — E83.51 HYPOCALCEMIA: Status: ACTIVE | Noted: 2021-05-09

## 2021-05-09 PROBLEM — E11.9 TYPE 2 DIABETES MELLITUS (HCC): Status: ACTIVE | Noted: 2021-05-09

## 2021-05-09 LAB
ALBUMIN SERPL-MCNC: 3.3 G/DL (ref 3.5–5.2)
ALBUMIN SERPL-MCNC: 3.4 G/DL (ref 3.5–5.2)
ALP BLD-CCNC: 52 U/L (ref 35–104)
ALP BLD-CCNC: 53 U/L (ref 35–104)
ALT SERPL-CCNC: 25 U/L (ref 5–33)
ALT SERPL-CCNC: 27 U/L (ref 5–33)
ANION GAP SERPL CALCULATED.3IONS-SCNC: 10 MMOL/L (ref 7–19)
ANION GAP SERPL CALCULATED.3IONS-SCNC: 14 MMOL/L (ref 7–19)
APTT: 34.3 SEC (ref 26–36.2)
AST SERPL-CCNC: 50 U/L (ref 5–32)
AST SERPL-CCNC: 52 U/L (ref 5–32)
ATYPICAL LYMPHOCYTE RELATIVE PERCENT: 3 % (ref 0–8)
ATYPICAL LYMPHOCYTE RELATIVE PERCENT: 4 % (ref 0–8)
BANDED NEUTROPHILS RELATIVE PERCENT: 1 % (ref 0–5)
BASE EXCESS ARTERIAL: 0.1 MMOL/L (ref -2–2)
BASOPHILS ABSOLUTE: 0 K/UL (ref 0–0.2)
BASOPHILS ABSOLUTE: 0 K/UL (ref 0–0.2)
BASOPHILS RELATIVE PERCENT: 0 % (ref 0–1)
BASOPHILS RELATIVE PERCENT: 0 % (ref 0–1)
BILIRUB SERPL-MCNC: 0.4 MG/DL (ref 0.2–1.2)
BILIRUB SERPL-MCNC: 0.5 MG/DL (ref 0.2–1.2)
BILIRUBIN URINE: NEGATIVE
BLOOD, URINE: NEGATIVE
BUN BLDV-MCNC: 6 MG/DL (ref 8–23)
BUN BLDV-MCNC: 8 MG/DL (ref 8–23)
CALCIUM SERPL-MCNC: 8.3 MG/DL (ref 8.8–10.2)
CALCIUM SERPL-MCNC: 8.5 MG/DL (ref 8.8–10.2)
CARBOXYHEMOGLOBIN ARTERIAL: 2 % (ref 0–5)
CHLORIDE BLD-SCNC: 88 MMOL/L (ref 98–111)
CHLORIDE BLD-SCNC: 96 MMOL/L (ref 98–111)
CLARITY: CLEAR
CO2: 23 MMOL/L (ref 22–29)
CO2: 28 MMOL/L (ref 22–29)
COLOR: YELLOW
CREAT SERPL-MCNC: 0.5 MG/DL (ref 0.5–0.9)
CREAT SERPL-MCNC: 0.6 MG/DL (ref 0.5–0.9)
EOSINOPHILS ABSOLUTE: 0 K/UL (ref 0–0.6)
EOSINOPHILS ABSOLUTE: 0.1 K/UL (ref 0–0.6)
EOSINOPHILS RELATIVE PERCENT: 0 % (ref 0–5)
EOSINOPHILS RELATIVE PERCENT: 1 % (ref 0–5)
GFR AFRICAN AMERICAN: >59
GFR AFRICAN AMERICAN: >59
GFR NON-AFRICAN AMERICAN: >60
GFR NON-AFRICAN AMERICAN: >60
GLUCOSE BLD-MCNC: 134 MG/DL (ref 74–109)
GLUCOSE BLD-MCNC: 157 MG/DL (ref 74–109)
GLUCOSE BLD-MCNC: 164 MG/DL (ref 70–99)
GLUCOSE BLD-MCNC: 181 MG/DL (ref 70–99)
GLUCOSE BLD-MCNC: 186 MG/DL (ref 70–99)
GLUCOSE URINE: NEGATIVE MG/DL
HCO3 ARTERIAL: 23.8 MMOL/L (ref 22–26)
HCT VFR BLD CALC: 39.7 % (ref 37–47)
HCT VFR BLD CALC: 40.4 % (ref 37–47)
HEMOGLOBIN, ART, EXTENDED: 13.5 G/DL (ref 12–16)
HEMOGLOBIN: 13.1 G/DL (ref 12–16)
HEMOGLOBIN: 13.3 G/DL (ref 12–16)
IMMATURE GRANULOCYTES #: 0.1 K/UL
IMMATURE GRANULOCYTES #: 0.1 K/UL
INR BLD: 1.11 (ref 0.88–1.18)
KETONES, URINE: NEGATIVE MG/DL
LACTIC ACID: 2.5 MMOL/L (ref 0.5–1.9)
LACTIC ACID: 3.4 MMOL/L (ref 0.5–1.9)
LEUKOCYTE ESTERASE, URINE: NEGATIVE
LYMPHOCYTES ABSOLUTE: 0.7 K/UL (ref 1.1–4.5)
LYMPHOCYTES ABSOLUTE: 1.4 K/UL (ref 1.1–4.5)
LYMPHOCYTES RELATIVE PERCENT: 13 % (ref 20–40)
LYMPHOCYTES RELATIVE PERCENT: 4 % (ref 20–40)
MAGNESIUM: 1.7 MG/DL (ref 1.6–2.4)
MCH RBC QN AUTO: 29.4 PG (ref 27–31)
MCH RBC QN AUTO: 29.6 PG (ref 27–31)
MCHC RBC AUTO-ENTMCNC: 32.9 G/DL (ref 33–37)
MCHC RBC AUTO-ENTMCNC: 33 G/DL (ref 33–37)
MCV RBC AUTO: 89.2 FL (ref 81–99)
MCV RBC AUTO: 90 FL (ref 81–99)
METHEMOGLOBIN ARTERIAL: 0.9 %
MONOCYTES ABSOLUTE: 0.1 K/UL (ref 0–0.9)
MONOCYTES ABSOLUTE: 0.6 K/UL (ref 0–0.9)
MONOCYTES RELATIVE PERCENT: 1 % (ref 0–10)
MONOCYTES RELATIVE PERCENT: 6 % (ref 0–10)
NEUTROPHILS ABSOLUTE: 6.9 K/UL (ref 1.5–7.5)
NEUTROPHILS ABSOLUTE: 8.3 K/UL (ref 1.5–7.5)
NEUTROPHILS RELATIVE PERCENT: 82 % (ref 50–65)
NEUTROPHILS RELATIVE PERCENT: 85 % (ref 50–65)
NITRITE, URINE: NEGATIVE
O2 CONTENT ARTERIAL: 16.6 ML/DL
O2 SAT, ARTERIAL: 87.5 %
O2 THERAPY: ABNORMAL
PCO2 ARTERIAL: 35 MMHG (ref 35–45)
PDW BLD-RTO: 12.3 % (ref 11.5–14.5)
PDW BLD-RTO: 12.4 % (ref 11.5–14.5)
PERFORMED ON: ABNORMAL
PH ARTERIAL: 7.44 (ref 7.35–7.45)
PH UA: 6.5 (ref 5–8)
PLATELET # BLD: 263 K/UL (ref 130–400)
PLATELET # BLD: 267 K/UL (ref 130–400)
PLATELET SLIDE REVIEW: ADEQUATE
PLATELET SLIDE REVIEW: ADEQUATE
PMV BLD AUTO: 10 FL (ref 9.4–12.3)
PMV BLD AUTO: 9.8 FL (ref 9.4–12.3)
PO2 ARTERIAL: 49 MMHG (ref 80–100)
POTASSIUM REFLEX MAGNESIUM: 3.1 MMOL/L (ref 3.5–5)
POTASSIUM REFLEX MAGNESIUM: 4.1 MMOL/L (ref 3.5–5)
POTASSIUM, WHOLE BLOOD: 3
PRO-BNP: 598 PG/ML (ref 0–900)
PROTEIN UA: NEGATIVE MG/DL
PROTHROMBIN TIME: 14.3 SEC (ref 12–14.6)
RBC # BLD: 4.45 M/UL (ref 4.2–5.4)
RBC # BLD: 4.49 M/UL (ref 4.2–5.4)
SODIUM BLD-SCNC: 125 MMOL/L (ref 136–145)
SODIUM BLD-SCNC: 134 MMOL/L (ref 136–145)
SPECIFIC GRAVITY UA: 1 (ref 1–1.03)
TOTAL PROTEIN: 6.5 G/DL (ref 6.6–8.7)
TOTAL PROTEIN: 7 G/DL (ref 6.6–8.7)
TROPONIN: <0.01 NG/ML (ref 0–0.03)
UROBILINOGEN, URINE: 0.2 E.U./DL
WBC # BLD: 8.4 K/UL (ref 4.8–10.8)
WBC # BLD: 9.6 K/UL (ref 4.8–10.8)

## 2021-05-09 PROCEDURE — 85025 COMPLETE CBC W/AUTO DIFF WBC: CPT

## 2021-05-09 PROCEDURE — 6360000002 HC RX W HCPCS: Performed by: FAMILY MEDICINE

## 2021-05-09 PROCEDURE — 85610 PROTHROMBIN TIME: CPT

## 2021-05-09 PROCEDURE — 85730 THROMBOPLASTIN TIME PARTIAL: CPT

## 2021-05-09 PROCEDURE — 82803 BLOOD GASES ANY COMBINATION: CPT

## 2021-05-09 PROCEDURE — 2100000000 HC CCU R&B

## 2021-05-09 PROCEDURE — 2580000003 HC RX 258: Performed by: FAMILY MEDICINE

## 2021-05-09 PROCEDURE — 36415 COLL VENOUS BLD VENIPUNCTURE: CPT

## 2021-05-09 PROCEDURE — 71045 X-RAY EXAM CHEST 1 VIEW: CPT

## 2021-05-09 PROCEDURE — 83605 ASSAY OF LACTIC ACID: CPT

## 2021-05-09 PROCEDURE — 84132 ASSAY OF SERUM POTASSIUM: CPT

## 2021-05-09 PROCEDURE — 82947 ASSAY GLUCOSE BLOOD QUANT: CPT

## 2021-05-09 PROCEDURE — 83880 ASSAY OF NATRIURETIC PEPTIDE: CPT

## 2021-05-09 PROCEDURE — 84484 ASSAY OF TROPONIN QUANT: CPT

## 2021-05-09 PROCEDURE — 83735 ASSAY OF MAGNESIUM: CPT

## 2021-05-09 PROCEDURE — 6370000000 HC RX 637 (ALT 250 FOR IP): Performed by: FAMILY MEDICINE

## 2021-05-09 PROCEDURE — 2580000003 HC RX 258: Performed by: EMERGENCY MEDICINE

## 2021-05-09 PROCEDURE — 6370000000 HC RX 637 (ALT 250 FOR IP): Performed by: EMERGENCY MEDICINE

## 2021-05-09 PROCEDURE — 80053 COMPREHEN METABOLIC PANEL: CPT

## 2021-05-09 PROCEDURE — 36600 WITHDRAWAL OF ARTERIAL BLOOD: CPT

## 2021-05-09 PROCEDURE — 81003 URINALYSIS AUTO W/O SCOPE: CPT

## 2021-05-09 PROCEDURE — 99283 EMERGENCY DEPT VISIT LOW MDM: CPT

## 2021-05-09 PROCEDURE — 2700000000 HC OXYGEN THERAPY PER DAY

## 2021-05-09 RX ORDER — METOPROLOL SUCCINATE 50 MG/1
50 TABLET, EXTENDED RELEASE ORAL DAILY
Status: DISCONTINUED | OUTPATIENT
Start: 2021-05-09 | End: 2021-05-12

## 2021-05-09 RX ORDER — AZELASTINE 1 MG/ML
2 SPRAY, METERED NASAL 2 TIMES DAILY
Status: DISCONTINUED | OUTPATIENT
Start: 2021-05-09 | End: 2021-05-09 | Stop reason: RX

## 2021-05-09 RX ORDER — FEXOFENADINE HCL 180 MG/1
TABLET ORAL
COMMUNITY

## 2021-05-09 RX ORDER — NIACIN 500 MG
500 TABLET ORAL NIGHTLY
COMMUNITY

## 2021-05-09 RX ORDER — ASPIRIN 81 MG/1
81 TABLET ORAL DAILY
COMMUNITY

## 2021-05-09 RX ORDER — CETIRIZINE HYDROCHLORIDE 10 MG/1
10 TABLET ORAL DAILY
Status: DISCONTINUED | OUTPATIENT
Start: 2021-05-09 | End: 2021-05-15 | Stop reason: HOSPADM

## 2021-05-09 RX ORDER — SODIUM CHLORIDE 0.9 % (FLUSH) 0.9 %
5-40 SYRINGE (ML) INJECTION PRN
Status: DISCONTINUED | OUTPATIENT
Start: 2021-05-09 | End: 2021-05-15 | Stop reason: HOSPADM

## 2021-05-09 RX ORDER — ACETAMINOPHEN 650 MG/1
650 SUPPOSITORY RECTAL EVERY 6 HOURS PRN
Status: DISCONTINUED | OUTPATIENT
Start: 2021-05-09 | End: 2021-05-15 | Stop reason: HOSPADM

## 2021-05-09 RX ORDER — NICOTINE POLACRILEX 4 MG
15 LOZENGE BUCCAL PRN
Status: DISCONTINUED | OUTPATIENT
Start: 2021-05-09 | End: 2021-05-15 | Stop reason: HOSPADM

## 2021-05-09 RX ORDER — SODIUM CHLORIDE 0.9 % (FLUSH) 0.9 %
5-40 SYRINGE (ML) INJECTION EVERY 12 HOURS SCHEDULED
Status: DISCONTINUED | OUTPATIENT
Start: 2021-05-09 | End: 2021-05-15 | Stop reason: HOSPADM

## 2021-05-09 RX ORDER — PROMETHAZINE HYDROCHLORIDE 12.5 MG/1
12.5 TABLET ORAL EVERY 6 HOURS PRN
Status: DISCONTINUED | OUTPATIENT
Start: 2021-05-09 | End: 2021-05-15 | Stop reason: HOSPADM

## 2021-05-09 RX ORDER — DEXTROSE MONOHYDRATE 25 G/50ML
12.5 INJECTION, SOLUTION INTRAVENOUS PRN
Status: DISCONTINUED | OUTPATIENT
Start: 2021-05-09 | End: 2021-05-15 | Stop reason: HOSPADM

## 2021-05-09 RX ORDER — 0.9 % SODIUM CHLORIDE 0.9 %
1500 INTRAVENOUS SOLUTION INTRAVENOUS ONCE
Status: COMPLETED | OUTPATIENT
Start: 2021-05-09 | End: 2021-05-09

## 2021-05-09 RX ORDER — VITAMIN B COMPLEX
2000 TABLET ORAL DAILY
Status: DISCONTINUED | OUTPATIENT
Start: 2021-05-09 | End: 2021-05-15 | Stop reason: HOSPADM

## 2021-05-09 RX ORDER — ASPIRIN 81 MG/1
81 TABLET ORAL DAILY
Status: DISCONTINUED | OUTPATIENT
Start: 2021-05-09 | End: 2021-05-15 | Stop reason: HOSPADM

## 2021-05-09 RX ORDER — ONDANSETRON 2 MG/ML
4 INJECTION INTRAMUSCULAR; INTRAVENOUS EVERY 6 HOURS PRN
Status: DISCONTINUED | OUTPATIENT
Start: 2021-05-09 | End: 2021-05-15 | Stop reason: HOSPADM

## 2021-05-09 RX ORDER — POTASSIUM CHLORIDE 20 MEQ/1
40 TABLET, EXTENDED RELEASE ORAL PRN
Status: DISCONTINUED | OUTPATIENT
Start: 2021-05-09 | End: 2021-05-15 | Stop reason: HOSPADM

## 2021-05-09 RX ORDER — NIACIN 500 MG/1
500 TABLET, EXTENDED RELEASE ORAL NIGHTLY
Status: DISCONTINUED | OUTPATIENT
Start: 2021-05-09 | End: 2021-05-15 | Stop reason: HOSPADM

## 2021-05-09 RX ORDER — POLYETHYLENE GLYCOL 3350 17 G/17G
17 POWDER, FOR SOLUTION ORAL DAILY PRN
Status: DISCONTINUED | OUTPATIENT
Start: 2021-05-09 | End: 2021-05-15 | Stop reason: HOSPADM

## 2021-05-09 RX ORDER — FAMOTIDINE 20 MG/1
TABLET, FILM COATED ORAL
COMMUNITY

## 2021-05-09 RX ORDER — ACETAMINOPHEN 325 MG/1
650 TABLET ORAL EVERY 6 HOURS PRN
Status: DISCONTINUED | OUTPATIENT
Start: 2021-05-09 | End: 2021-05-15 | Stop reason: HOSPADM

## 2021-05-09 RX ORDER — BISOPROLOL FUMARATE AND HYDROCHLOROTHIAZIDE 5; 6.25 MG/1; MG/1
TABLET ORAL
COMMUNITY

## 2021-05-09 RX ORDER — ONDANSETRON 4 MG/1
TABLET, FILM COATED ORAL
COMMUNITY

## 2021-05-09 RX ORDER — SODIUM CHLORIDE 9 MG/ML
25 INJECTION, SOLUTION INTRAVENOUS PRN
Status: DISCONTINUED | OUTPATIENT
Start: 2021-05-09 | End: 2021-05-15 | Stop reason: HOSPADM

## 2021-05-09 RX ORDER — DEXTROSE MONOHYDRATE 50 MG/ML
100 INJECTION, SOLUTION INTRAVENOUS PRN
Status: DISCONTINUED | OUTPATIENT
Start: 2021-05-09 | End: 2021-05-15 | Stop reason: HOSPADM

## 2021-05-09 RX ORDER — 0.9 % SODIUM CHLORIDE 0.9 %
1000 INTRAVENOUS SOLUTION INTRAVENOUS ONCE
Status: COMPLETED | OUTPATIENT
Start: 2021-05-09 | End: 2021-05-09

## 2021-05-09 RX ORDER — GUAIFENESIN/DEXTROMETHORPHAN 100-10MG/5
5 SYRUP ORAL EVERY 4 HOURS PRN
Status: DISCONTINUED | OUTPATIENT
Start: 2021-05-09 | End: 2021-05-15 | Stop reason: HOSPADM

## 2021-05-09 RX ORDER — POTASSIUM CHLORIDE 7.45 MG/ML
10 INJECTION INTRAVENOUS PRN
Status: DISCONTINUED | OUTPATIENT
Start: 2021-05-09 | End: 2021-05-15 | Stop reason: HOSPADM

## 2021-05-09 RX ORDER — FAMOTIDINE 20 MG/1
20 TABLET, FILM COATED ORAL 2 TIMES DAILY
Status: DISCONTINUED | OUTPATIENT
Start: 2021-05-09 | End: 2021-05-15 | Stop reason: HOSPADM

## 2021-05-09 RX ORDER — IBUPROFEN 200 MG
CAPSULE ORAL
COMMUNITY

## 2021-05-09 RX ORDER — AZELASTINE 1 MG/ML
SPRAY, METERED NASAL
COMMUNITY

## 2021-05-09 RX ORDER — BISOPROLOL FUMARATE AND HYDROCHLOROTHIAZIDE 5; 6.25 MG/1; MG/1
1 TABLET ORAL DAILY
Status: DISCONTINUED | OUTPATIENT
Start: 2021-05-09 | End: 2021-05-09 | Stop reason: CLARIF

## 2021-05-09 RX ADMIN — SODIUM CHLORIDE 1000 ML: 9 INJECTION, SOLUTION INTRAVENOUS at 08:13

## 2021-05-09 RX ADMIN — ENOXAPARIN SODIUM 30 MG: 30 INJECTION SUBCUTANEOUS at 20:20

## 2021-05-09 RX ADMIN — Medication 2000 UNITS: at 10:32

## 2021-05-09 RX ADMIN — SODIUM CHLORIDE 1500 ML: 9 INJECTION, SOLUTION INTRAVENOUS at 12:27

## 2021-05-09 RX ADMIN — METOPROLOL SUCCINATE 50 MG: 50 TABLET, EXTENDED RELEASE ORAL at 10:34

## 2021-05-09 RX ADMIN — CETIRIZINE HYDROCHLORIDE 10 MG: 10 TABLET, FILM COATED ORAL at 10:32

## 2021-05-09 RX ADMIN — INSULIN LISPRO 2 UNITS: 100 INJECTION, SOLUTION INTRAVENOUS; SUBCUTANEOUS at 12:51

## 2021-05-09 RX ADMIN — DEXAMETHASONE 6 MG: 2 TABLET ORAL at 10:32

## 2021-05-09 RX ADMIN — ASPIRIN 81 MG: 81 TABLET, COATED ORAL at 10:32

## 2021-05-09 RX ADMIN — INSULIN LISPRO 2 UNITS: 100 INJECTION, SOLUTION INTRAVENOUS; SUBCUTANEOUS at 17:16

## 2021-05-09 RX ADMIN — SODIUM CHLORIDE, PRESERVATIVE FREE 10 ML: 5 INJECTION INTRAVENOUS at 10:33

## 2021-05-09 RX ADMIN — ENOXAPARIN SODIUM 30 MG: 30 INJECTION SUBCUTANEOUS at 10:32

## 2021-05-09 RX ADMIN — FAMOTIDINE 20 MG: 20 TABLET, FILM COATED ORAL at 10:33

## 2021-05-09 RX ADMIN — ACETAMINOPHEN 650 MG: 325 TABLET ORAL at 15:31

## 2021-05-09 RX ADMIN — SODIUM CHLORIDE, PRESERVATIVE FREE 10 ML: 5 INJECTION INTRAVENOUS at 20:20

## 2021-05-09 RX ADMIN — POTASSIUM BICARBONATE 40 MEQ: 782 TABLET, EFFERVESCENT ORAL at 08:14

## 2021-05-09 RX ADMIN — NIACIN 500 MG: 500 TABLET, EXTENDED RELEASE ORAL at 20:21

## 2021-05-09 RX ADMIN — FAMOTIDINE 20 MG: 20 TABLET, FILM COATED ORAL at 20:21

## 2021-05-09 ASSESSMENT — ENCOUNTER SYMPTOMS
ABDOMINAL PAIN: 0
SINUS PRESSURE: 0
SHORTNESS OF BREATH: 1
APNEA: 0
BLOOD IN STOOL: 0
CHOKING: 0
DIARRHEA: 1
CONSTIPATION: 0
SORE THROAT: 0
COUGH: 1
FACIAL SWELLING: 0
NAUSEA: 1
EYE DISCHARGE: 0
VOICE CHANGE: 0

## 2021-05-09 ASSESSMENT — PAIN SCALES - GENERAL
PAINLEVEL_OUTOF10: 0
PAINLEVEL_OUTOF10: 0

## 2021-05-09 ASSESSMENT — PAIN DESCRIPTION - PAIN TYPE: TYPE: ACUTE PAIN

## 2021-05-09 NOTE — PROGRESS NOTES
Armando Yung arrived to room # [id number]. Presented with: Covid PNA  Mental Status: Patient is oriented, alert, coherent, logical, thought processes intact and able to concentrate and follow conversation. Vitals:    05/09/21 1100   BP:    Pulse:    Resp:    Temp: 99.5 °F (37.5 °C)   SpO2:      Patient safety contract and falls prevention contract reviewed with patient Yes. Oriented Patient to room. Call light within reach. Yes.   Needs, issues or concerns expressed at this time: no.      Electronically signed by Raad Tang RN on 5/9/2021 at 11:17 AM

## 2021-05-09 NOTE — ED TRIAGE NOTES
Pt here with SOB known covid positive. Pt was 77 initially on room air up to 87 after resting.  Lips dusky upon initial assessment

## 2021-05-09 NOTE — H&P
Hospital Medicine  History and Physical    Patient:  Isis Antoine  MRN: [id number]    CHIEF COMPLAINT:  Fatigue, shortness of breath, nausea, vomiting, diarrhea    History Obtained From: patient, chart    PCP: Yaa García MD    HISTORY OF PRESENT ILLNESS:  66year old white female presents to the emergency department with complaint of worsening fatigue, weakness, and shortness of breath. Onset about one week ago over the weekend. Progressed to the point that she presented to her PCP and underwent COVID-19 testing Tuesday, 5-4. She was called with a positive result 5-6. She does not know if her oxygen saturation was measured at that time. She took ondansetron for nausea with some improvement but then developed diarrhea. Her weakness has worsened and she has been unable to keep herself hydrated due to poor tolerance for oral intake. Her  is sick with the same symptoms. Presented to ED with SaO2 in mid 80%s, febrile with respiratory distress. No recent antibiotics or steroids. Cough is usually nonproductive. REVIEW OF SYSTEMS:  14 systems reviewed and negative except as noted above. Past Medical History:      Diagnosis Date    Diabetes mellitus ([facility] [address]     Hypertension        Past Surgical History:      Procedure Laterality Date    CATARACT REMOVAL      HYSTERECTOMY      LITHOTRIPSY         Medications Prior to Admission:    Prior to Admission medications    Medication Sig Start Date End Date Taking? Authorizing Provider   aspirin 81 MG EC tablet Take 81 mg by mouth daily   Yes Historical Provider, MD   famotidine (PEPCID) 20 MG tablet Pepcid 20 mg tablet   Take 1 tablet every day by oral route as needed.    Yes Historical Provider, MD   vitamin D (CHOLECALCIFEROL) 125 MCG (5000 UT) CAPS capsule cholecalciferol (vitamin D3) 125 mcg (5,000 unit) capsule   one daily   Yes Historical Provider, MD   fexofenadine (ALLEGRA ALLERGY) 180 MG tablet Allegra Allergy 180 mg tablet   Take 1 tablet every day by oral route as needed. Historical Provider, MD   Calcium Carbonate-Vitamin D (CALCIUM 500/D) 500-125 MG-UNIT TABS calcium carbonate 600 mg (1,500 mg)-vitamin D3 2,500 unit capsule   one daily    Historical Provider, MD   azelastine (ASTELIN) 0.1 % nasal spray azelastine 137 mcg (0.1 %) nasal spray aerosol    Historical Provider, MD   bisoprolol-hydroCHLOROthiazide (ZIAC) 5-6.25 MG per tablet bisoprolol 5 mg-hydrochlorothiazide 6.25 mg tablet   TAKE 1 TABLET BY MOUTH ONCE DAILY. Historical Provider, MD   niacin 500 MG tablet Take 500 mg by mouth nightly    Historical Provider, MD   ondansetron (ZOFRAN) 4 MG tablet Zofran 4 mg tablet   Take 1 tablet every 8 hours by oral route as needed. as needed for nausea    Historical Provider, MD       Allergies:  Alendronate, Clarithromycin, and Codeine    Social History:   TOBACCO:   reports that she has never smoked. She has never used smokeless tobacco.  ETOH:   reports no history of alcohol use. Family History:   History reviewed. No pertinent family history. Physical Exam:    Vitals: BP (!) 128/58   Pulse 93   Temp 99.5 °F (37.5 °C) (Axillary)   Resp (!) 38   Wt 178 lb (80.7 kg)   SpO2 (!) 87%   24HR INTAKE/OUTPUT:  No intake or output data in the 24 hours ending 05/09/21 1149  General appearance: alert and cooperative with exam  HEENT: atraumatic, eyes with clear conjunctiva and normal lids, pupils and irises normal, external ears and nose are normal, lips normal. Neck without masses, lympadenopathy, bruit, thyroid normal  Lungs: no increased work of breathing, diminished breath sounds bibasilar  Heart: regular rate and rhythm, S1, S2 normal, no murmur, click, rub or gallop  Abdomen: soft, non-tender; bowel sounds normal; no masses,  no organomegaly  Extremities: extremities normal without clubbing, atraumatic, no cyanosis or edema  Neurologic: No focal neurologic deficits, normal sensation, alert and oriented, affect and mood appropriate.   Skin: no rashes, nodules. CBC:   Recent Labs     05/09/21 0716   WBC 8.4   HGB 13.3        BMP:    Recent Labs     05/09/21 0715 05/09/21 0732   *  --    K 3.1* 3.0   CL 88*  --    CO2 23  --    BUN 8  --    CREATININE 0.6  --    GLUCOSE 157*  --      Hepatic:   Recent Labs     05/09/21 0715   AST 52*   ALT 27   BILITOT 0.5   ALKPHOS 53     Troponin T:   Recent Labs     05/09/21 0715   TROPONINI <0.01     ABGs: No results for input(s): PH, PCO2, PO2, HCO3, BE, O2SAT in the last 72 hours. INR:   Recent Labs     05/09/21 0740   INR 1.11     Lactic Acid:   Recent Labs     05/09/21 0715   LACTA 3.4*     -----------------------------------------------------------------  PA/lat CXR: bilateral groundglass infiltrates    Assessment and Plan   Principal Problem:    Pneumonia due to COVID-19 virus  Active Problems:    Essential hypertension    Type 2 diabetes mellitus (HCC)    Sepsis ([facility] [address]    Hyponatremia    Hypocalcemia    Hypokalemia    Acute respiratory failure with hypoxia (HCC)  Resolved Problems:    * No resolved hospital problems. *      Admit to med/surg COVID-19 olguin. CCU used for overflow. Day #1 of 10 dexamethasone. Vitamin D supplementation for supportive care. Guaifenesin/dextromethorphan. Normal saline bolus for hypovolemic hyponatremia. Dry tray but no overt fluid restriction at this time. Follow electrolytes q6h. Potassium replacement per scale. Insulin scale. Home medications reconciled.     Dixon Fearing, DO  Admitting Hospitalist

## 2021-05-09 NOTE — PROGRESS NOTES
Axillary temp 100.2 F, Pt c/o headache. PRN Tylenol given per order. O2 sat dropped to 82-85% while using bedsiden commode, resp rates >30, but quickly returned to 90% with resting and deep breathing technique. Pt remains on 4L nasal cannula. Will continue to monitor.

## 2021-05-09 NOTE — PROGRESS NOTES
PHARMACY NOTE  Herminia Delgado was ordered Astelin Nasal spray. Per the [address], this medication is non-formulary and not stocked by pharmacy. It has been discontinued. The medication can be reordered at discharge.      Electronically signed by Zohra Fowler [facility] on 5/9/2021 at 10:25 AM

## 2021-05-09 NOTE — PROGRESS NOTES
Results for Letha White (MRN [id number]) as of 5/9/2021 07:35   Ref.  Range 5/9/2021 07:32   Hemoglobin, Art, Extended Latest Ref Range: 12.0 - 16.0 g/dL 13.5   pH, Arterial Latest Ref Range: 7.350 - 7.450  7.440   pCO2, Arterial Latest Ref Range: 35.0 - 45.0 mmHg 35.0   pO2, Arterial Latest Ref Range: 80.0 - 100.0 mmHg 49.0 (LL)   HCO3, Arterial Latest Ref Range: 22.0 - 26.0 mmol/L 23.8   Base Excess, Arterial Latest Ref Range: -2.0 - 2.0 mmol/L 0.1   O2 Sat, Arterial Latest Ref Range: >92 % 87.5 (LL)   O2 Content, Arterial Latest Ref Range: Not Established mL/dL 16.6   Methemoglobin, Arterial Latest Ref Range: <1.5 % 0.9   Carboxyhgb, Arterial Latest Ref Range: 0.0 - 5.0 % 2.0   Pt on room air, rr, AT+

## 2021-05-09 NOTE — ED PROVIDER NOTES
[facility]  eMERGENCY dEPARTMENT eNCOUnter      Pt Name: Marianela Deras  MRN: [id number]  [place] 1950  Date of evaluation: 5/9/2021  Provider: Jareth Dennis MD    [address] [place] [address]       Chief Complaint   Patient presents with    Positive For Covid-19    Shortness of Breath         HISTORY OF PRESENT ILLNESS   (Location/Symptom, Timing/Onset,Context/Setting, Quality, Duration, Modifying Factors, Severity)  Note limiting factors. Marianela Deras is a 66 y.o. female who presents to the emergency department valuation shortness of breath known Covid positive    68-year-old female tested positive for Covid last week. Her symptoms began about a week ago. She found out about her test results this past Thursday. Today she presents to the emergency room with shortness of breath and saturating in the 80s. Her  is also registered patient in the emergency room with respiratory failure. She denies any pulmonary disease. She denies smoking. She is not having chest pain. Her doctor did call her in some Zofran last week for nausea and she states she has had diarrhea. The history is provided by the patient and medical records. NursingNotes were reviewed. REVIEW OF SYSTEMS    (2-9 systems for level 4, 10 or more for level 5)     Review of Systems   Constitutional: Positive for fatigue. Negative for chills and fever. HENT: Negative for congestion, drooling, facial swelling, nosebleeds, sinus pressure, sore throat and voice change. Eyes: Negative for discharge. Respiratory: Positive for cough and shortness of breath. Negative for apnea and choking. Cardiovascular: Negative for chest pain and leg swelling. Gastrointestinal: Positive for diarrhea and nausea. Negative for abdominal pain, blood in stool and constipation. Genitourinary: Negative for dysuria and enuresis. Musculoskeletal: Negative for joint swelling. Skin: Negative for rash and wound.    Neurological: Negative for seizures and syncope. Psychiatric/Behavioral: Negative for behavioral problems, hallucinations and suicidal ideas. All other systems reviewed and are negative. A complete review of systems was performed and is negative except as noted above in the HPI. PAST MEDICAL HISTORY     Past Medical History:   Diagnosis Date    Diabetes mellitus ([facility] [address]     Hypertension          SURGICAL HISTORY       Past Surgical History:   Procedure Laterality Date    HYSTERECTOMY      LITHOTRIPSY           CURRENT MEDICATIONS       Previous Medications    ASPIRIN 81 MG EC TABLET    Take 81 mg by mouth daily    AZELASTINE (ASTELIN) 0.1 % NASAL SPRAY    azelastine 137 mcg (0.1 %) nasal spray aerosol    BISOPROLOL-HYDROCHLOROTHIAZIDE (ZIAC) 5-6.25 MG PER TABLET    bisoprolol 5 mg-hydrochlorothiazide 6.25 mg tablet   TAKE 1 TABLET BY MOUTH ONCE DAILY. CALCIUM CARBONATE-VITAMIN D (CALCIUM 500/D) 500-125 MG-UNIT TABS    calcium carbonate 600 mg (1,500 mg)-vitamin D3 2,500 unit capsule   one daily    FAMOTIDINE (PEPCID) 20 MG TABLET    Pepcid 20 mg tablet   Take 1 tablet every day by oral route as needed. FEXOFENADINE (ALLEGRA ALLERGY) 180 MG TABLET    Allegra Allergy 180 mg tablet   Take 1 tablet every day by oral route as needed. NIACIN 500 MG TABLET    Take 500 mg by mouth nightly    ONDANSETRON (ZOFRAN) 4 MG TABLET    Zofran 4 mg tablet   Take 1 tablet every 8 hours by oral route as needed. as needed for nausea    VITAMIN D (CHOLECALCIFEROL) 125 MCG (5000 UT) CAPS CAPSULE    cholecalciferol (vitamin D3) 125 mcg (5,000 unit) capsule   one daily       ALLERGIES     Alendronate, Clarithromycin, and Codeine    FAMILY HISTORY     No family history on file.        SOCIAL HISTORY       Social History     Socioeconomic History    Marital status:      Spouse name: Not on file    Number of children: Not on file    Years of education: Not on file    Highest education level: Not on file   Occupational History  Not on file   Social Needs    Financial resource strain: Not on file    Food insecurity     Worry: Not on file     Inability: Not on file    Transportation needs     Medical: Not on file     Non-medical: Not on file   Tobacco Use    Smoking status: Never Smoker    Smokeless tobacco: Never Used   Substance and Sexual Activity    Alcohol use: Never    Drug use: Never    Sexual activity: Never   Lifestyle    Physical activity     Days per week: Not on file     Minutes per session: Not on file    Stress: Not on file   Relationships    Social connections     Talks on phone: Not on file     Gets together: Not on file     Attends Bahai service: Not on file     Active member of club or organization: Not on file     Attends meetings of clubs or organizations: Not on file     Relationship status: Not on file    Intimate partner violence     Fear of current or ex partner: Not on file     Emotionally abused: Not on file     Physically abused: Not on file     Forced sexual activity: Not on file   Other Topics Concern    Not on file   Social History Narrative    Not on file       SCREENINGS             PHYSICAL EXAM    (up to 7 for level 4, 8 or more for level 5)     ED Triage Vitals [05/09/21 0715]   BP Temp Temp src Pulse Resp SpO2 Height Weight   139/68 99.2 °F (37.3 °C) -- 79 26 (!) 86 % -- 178 lb (80.7 kg)       Physical Exam  Vitals signs and nursing note reviewed. Constitutional:       Appearance: She is well-developed. Comments: She is mildly tachypneic and sats are reading in the high 80s. HENT:      Head: Normocephalic and atraumatic. Right Ear: External ear normal.      Left Ear: External ear normal.   Eyes:      General: No scleral icterus. Conjunctiva/sclera: Conjunctivae normal.      Pupils: Pupils are equal, round, and reactive to light. Neck:      Musculoskeletal: Normal range of motion and neck supple.    Cardiovascular:      Rate and Rhythm: Normal rate and regular rhythm. Pulses: Normal pulses. Heart sounds: Normal heart sounds. No murmur. Pulmonary:      Effort: Pulmonary effort is normal.      Breath sounds: Rales (Chest diffuse rales throughout) present. Abdominal:      General: Bowel sounds are normal.      Palpations: Abdomen is soft. Tenderness: There is no abdominal tenderness. Musculoskeletal: Normal range of motion. General: No swelling. Skin:     General: Skin is warm and dry. Coloration: Skin is not jaundiced or pale. Neurological:      General: No focal deficit present. Mental Status: She is alert and oriented to person, place, and time. Psychiatric:         Mood and Affect: Mood normal.         Behavior: Behavior normal.         DIAGNOSTIC RESULTS     EKG: All EKG's are interpreted by the Emergency Department Physician who either signs or Co-signs this chart in the absence of a cardiologist.    Sinus rhythm    RADIOLOGY:   Non-plain film images such as CT, Ultrasound and MRI are read by the radiologist. Plainradiographic images are visualized and preliminarily interpreted by the emergency physician with the below findings:    I have reviewed the images and results. Interpretation per the Radiologist below, if available at the time of this note:    XR CHEST PORTABLE   Final Result   Bilateral groundglass infiltrates, slightly greater on the   left, compatible with the history of COVID-19 pneumonia. No   pneumothorax or pleural effusion is identified. Signed by Dr Haydee Valle.  Saud on 5/9/2021 8:04 AM            ED BEDSIDE ULTRASOUND:   Performed by ED Physician - none    LABS:  Labs Reviewed   CBC WITH AUTO DIFFERENTIAL - Abnormal; Notable for the following components:       Result Value    MCHC 32.9 (*)     Neutrophils % 82.0 (*)     Lymphocytes % 13.0 (*)     All other components within normal limits   COMPREHENSIVE METABOLIC PANEL W/ REFLEX TO MG FOR LOW K - Abnormal; Notable for the following components: Sodium 125 (*)     Potassium reflex Magnesium 3.1 (*)     Chloride 88 (*)     Glucose 157 (*)     Calcium 8.5 (*)     Albumin 3.4 (*)     AST 52 (*)     All other components within normal limits   LACTIC ACID, PLASMA - Abnormal; Notable for the following components:    Lactic Acid 3.4 (*)     All other components within normal limits    Narrative:     CALL  Vee  K[facility] tel. ,  Chemistry results called to and read back by Aurea Zamarripa in ED, 05/09/2021  07:59, by BORA   BLOOD GAS, ARTERIAL - Abnormal; Notable for the following components:    pO2, Arterial 49.0 (*)     O2 Sat, Arterial 87.5 (*)     All other components within normal limits    Narrative:     CALL  Vee  [facility] tel. ,  zoe rowan rn, 05/09/2021 07:34, by RUKHSANA   PROTIME-INR   APTT   BRAIN NATRIURETIC PEPTIDE   TROPONIN   POTASSIUM, WHOLE BLOOD   MAGNESIUM   URINE RT REFLEX TO CULTURE       All other labs were within normal range or not returned as of this dictation. EMERGENCY DEPARTMENT COURSE and DIFFERENTIALDIAGNOSIS/MDM:   Vitals:    Vitals:    05/09/21 0715 05/09/21 0816   BP: 139/68    Pulse: 79 80   Resp: 26 18   Temp: 99.2 °F (37.3 °C)    SpO2: (!) 86% 93%   Weight: 178 lb (80.7 kg)        MDM  Number of Diagnoses or Management Options  Acute respiratory failure with hypoxia (HCC)  COVID-19  Diagnosis management comments: Patient sat up in the 90s on 2 L nasal cannula. I discussed the case with the hospitalist service. CONSULTS:  IP CONSULT TO HOSPITALIST    PROCEDURES:  Unless otherwise notedbelow, none     Procedures    FINAL IMPRESSION     1.  Acute respiratory failure with hypoxia (HCC)    2. COVID-19          DISPOSITION/PLAN   DISPOSITION        PATIENT REFERRED TO:  @FUP@    DISCHARGE MEDICATIONS:  New Prescriptions    No medications on file          (Please note that portions of this note were completed with a voice recognition program.  Efforts were made to edit the dictations butoccasionally words are mis-transcribed.)    Bernice Garza [facility], MD (electronically signed)  AttendingEmergency Physician          Elvis Torres MD  05/09/21 [id number]

## 2021-05-10 PROBLEM — Z51.5 PALLIATIVE CARE PATIENT: Status: ACTIVE | Noted: 2021-05-10

## 2021-05-10 LAB
ALBUMIN SERPL-MCNC: 3 G/DL (ref 3.5–5.2)
ALP BLD-CCNC: 45 U/L (ref 35–104)
ALT SERPL-CCNC: 20 U/L (ref 5–33)
ANION GAP SERPL CALCULATED.3IONS-SCNC: 8 MMOL/L (ref 7–19)
ANION GAP SERPL CALCULATED.3IONS-SCNC: 9 MMOL/L (ref 7–19)
ANION GAP SERPL CALCULATED.3IONS-SCNC: 9 MMOL/L (ref 7–19)
AST SERPL-CCNC: 42 U/L (ref 5–32)
BASOPHILS ABSOLUTE: 0 K/UL (ref 0–0.2)
BASOPHILS RELATIVE PERCENT: 0.1 % (ref 0–1)
BILIRUB SERPL-MCNC: 0.3 MG/DL (ref 0.2–1.2)
BUN BLDV-MCNC: 7 MG/DL (ref 8–23)
C-REACTIVE PROTEIN: 8.85 MG/DL (ref 0–0.5)
CALCIUM SERPL-MCNC: 7.9 MG/DL (ref 8.8–10.2)
CALCIUM SERPL-MCNC: 8 MG/DL (ref 8.8–10.2)
CALCIUM SERPL-MCNC: 8.4 MG/DL (ref 8.8–10.2)
CHLORIDE BLD-SCNC: 100 MMOL/L (ref 98–111)
CHLORIDE BLD-SCNC: 101 MMOL/L (ref 98–111)
CHLORIDE BLD-SCNC: 102 MMOL/L (ref 98–111)
CO2: 26 MMOL/L (ref 22–29)
CO2: 28 MMOL/L (ref 22–29)
CO2: 28 MMOL/L (ref 22–29)
CREAT SERPL-MCNC: 0.4 MG/DL (ref 0.5–0.9)
CREAT SERPL-MCNC: 0.5 MG/DL (ref 0.5–0.9)
CREAT SERPL-MCNC: 0.5 MG/DL (ref 0.5–0.9)
EOSINOPHILS ABSOLUTE: 0 K/UL (ref 0–0.6)
EOSINOPHILS RELATIVE PERCENT: 0 % (ref 0–5)
GFR AFRICAN AMERICAN: >59
GFR NON-AFRICAN AMERICAN: >60
GLUCOSE BLD-MCNC: 111 MG/DL (ref 70–99)
GLUCOSE BLD-MCNC: 129 MG/DL (ref 74–109)
GLUCOSE BLD-MCNC: 142 MG/DL (ref 70–99)
GLUCOSE BLD-MCNC: 152 MG/DL (ref 74–109)
GLUCOSE BLD-MCNC: 159 MG/DL (ref 70–99)
GLUCOSE BLD-MCNC: 160 MG/DL (ref 70–99)
GLUCOSE BLD-MCNC: 167 MG/DL (ref 74–109)
HCT VFR BLD CALC: 35.3 % (ref 37–47)
HEMOGLOBIN: 11.9 G/DL (ref 12–16)
IMMATURE GRANULOCYTES #: 0.1 K/UL
LYMPHOCYTES ABSOLUTE: 1.4 K/UL (ref 1.1–4.5)
LYMPHOCYTES RELATIVE PERCENT: 16.7 % (ref 20–40)
MCH RBC QN AUTO: 30.1 PG (ref 27–31)
MCHC RBC AUTO-ENTMCNC: 33.7 G/DL (ref 33–37)
MCV RBC AUTO: 89.1 FL (ref 81–99)
MONOCYTES ABSOLUTE: 0.5 K/UL (ref 0–0.9)
MONOCYTES RELATIVE PERCENT: 6.3 % (ref 0–10)
NEUTROPHILS ABSOLUTE: 6.2 K/UL (ref 1.5–7.5)
NEUTROPHILS RELATIVE PERCENT: 75.9 % (ref 50–65)
PDW BLD-RTO: 12.4 % (ref 11.5–14.5)
PERFORMED ON: ABNORMAL
PLATELET # BLD: 301 K/UL (ref 130–400)
PMV BLD AUTO: 9.9 FL (ref 9.4–12.3)
POTASSIUM REFLEX MAGNESIUM: 3.8 MMOL/L (ref 3.5–5)
POTASSIUM REFLEX MAGNESIUM: 3.9 MMOL/L (ref 3.5–5)
POTASSIUM REFLEX MAGNESIUM: 3.9 MMOL/L (ref 3.5–5)
RBC # BLD: 3.96 M/UL (ref 4.2–5.4)
SODIUM BLD-SCNC: 136 MMOL/L (ref 136–145)
SODIUM BLD-SCNC: 137 MMOL/L (ref 136–145)
SODIUM BLD-SCNC: 138 MMOL/L (ref 136–145)
TOTAL PROTEIN: 5.9 G/DL (ref 6.6–8.7)
WBC # BLD: 8.1 K/UL (ref 4.8–10.8)

## 2021-05-10 PROCEDURE — 2100000000 HC CCU R&B

## 2021-05-10 PROCEDURE — 86140 C-REACTIVE PROTEIN: CPT

## 2021-05-10 PROCEDURE — 6370000000 HC RX 637 (ALT 250 FOR IP): Performed by: FAMILY MEDICINE

## 2021-05-10 PROCEDURE — XW033E5 INTRODUCTION OF REMDESIVIR ANTI-INFECTIVE INTO PERIPHERAL VEIN, PERCUTANEOUS APPROACH, NEW TECHNOLOGY GROUP 5: ICD-10-PCS | Performed by: FAMILY MEDICINE

## 2021-05-10 PROCEDURE — 85025 COMPLETE CBC W/AUTO DIFF WBC: CPT

## 2021-05-10 PROCEDURE — 6360000002 HC RX W HCPCS: Performed by: FAMILY MEDICINE

## 2021-05-10 PROCEDURE — 2580000003 HC RX 258: Performed by: FAMILY MEDICINE

## 2021-05-10 PROCEDURE — XW033H5 INTRODUCTION OF TOCILIZUMAB INTO PERIPHERAL VEIN, PERCUTANEOUS APPROACH, NEW TECHNOLOGY GROUP 5: ICD-10-PCS | Performed by: FAMILY MEDICINE

## 2021-05-10 PROCEDURE — 2700000000 HC OXYGEN THERAPY PER DAY

## 2021-05-10 PROCEDURE — 80053 COMPREHEN METABOLIC PANEL: CPT

## 2021-05-10 PROCEDURE — 82947 ASSAY GLUCOSE BLOOD QUANT: CPT

## 2021-05-10 RX ORDER — GUAIFENESIN 600 MG/1
1200 TABLET, EXTENDED RELEASE ORAL 2 TIMES DAILY
Status: DISCONTINUED | OUTPATIENT
Start: 2021-05-10 | End: 2021-05-15 | Stop reason: HOSPADM

## 2021-05-10 RX ORDER — ZINC SULFATE 50(220)MG
50 CAPSULE ORAL DAILY
Status: DISCONTINUED | OUTPATIENT
Start: 2021-05-10 | End: 2021-05-15 | Stop reason: HOSPADM

## 2021-05-10 RX ADMIN — INSULIN LISPRO 2 UNITS: 100 INJECTION, SOLUTION INTRAVENOUS; SUBCUTANEOUS at 12:56

## 2021-05-10 RX ADMIN — NIACIN 500 MG: 500 TABLET, EXTENDED RELEASE ORAL at 20:21

## 2021-05-10 RX ADMIN — ASPIRIN 81 MG: 81 TABLET, COATED ORAL at 09:26

## 2021-05-10 RX ADMIN — ENOXAPARIN SODIUM 30 MG: 30 INJECTION SUBCUTANEOUS at 20:21

## 2021-05-10 RX ADMIN — ENOXAPARIN SODIUM 30 MG: 30 INJECTION SUBCUTANEOUS at 09:27

## 2021-05-10 RX ADMIN — GUAIFENESIN 1200 MG: 600 TABLET, EXTENDED RELEASE ORAL at 20:21

## 2021-05-10 RX ADMIN — TOCILIZUMAB 600 MG: 20 INJECTION, SOLUTION, CONCENTRATE INTRAVENOUS at 15:22

## 2021-05-10 RX ADMIN — Medication 50 MG: at 09:27

## 2021-05-10 RX ADMIN — DEXAMETHASONE 6 MG: 2 TABLET ORAL at 09:26

## 2021-05-10 RX ADMIN — FAMOTIDINE 20 MG: 20 TABLET, FILM COATED ORAL at 09:27

## 2021-05-10 RX ADMIN — SODIUM CHLORIDE, PRESERVATIVE FREE 10 ML: 5 INJECTION INTRAVENOUS at 09:27

## 2021-05-10 RX ADMIN — SODIUM CHLORIDE, PRESERVATIVE FREE 10 ML: 5 INJECTION INTRAVENOUS at 20:21

## 2021-05-10 RX ADMIN — FAMOTIDINE 20 MG: 20 TABLET, FILM COATED ORAL at 20:21

## 2021-05-10 RX ADMIN — GUAIFENESIN 1200 MG: 600 TABLET, EXTENDED RELEASE ORAL at 09:27

## 2021-05-10 RX ADMIN — Medication 2000 UNITS: at 09:26

## 2021-05-10 RX ADMIN — CETIRIZINE HYDROCHLORIDE 10 MG: 10 TABLET, FILM COATED ORAL at 09:27

## 2021-05-10 RX ADMIN — METOPROLOL SUCCINATE 50 MG: 50 TABLET, EXTENDED RELEASE ORAL at 09:27

## 2021-05-10 RX ADMIN — GUAIFENESIN AND DEXTROMETHORPHAN 5 ML: 100; 10 SYRUP ORAL at 07:04

## 2021-05-10 RX ADMIN — INSULIN LISPRO 2 UNITS: 100 INJECTION, SOLUTION INTRAVENOUS; SUBCUTANEOUS at 17:28

## 2021-05-10 ASSESSMENT — PAIN SCALES - GENERAL: PAINLEVEL_OUTOF10: 0

## 2021-05-10 NOTE — PROGRESS NOTES
Decreased O2 to 5L NC. Patient tolerating at 92%. Respirations at 24/ min. Will contniue to monitor and decrease O2 per requirements.      Electronically signed by Trupti Chaparro on 5/10/2021 at 1:07 PM

## 2021-05-10 NOTE — PROGRESS NOTES
Started night shift @ 4 liters N/C, upgraded to 6 liters N/C due to patient desating into the middle 80's with lite movement.  @ 0100 hrs 5-10-21 Upgraded to [employer] Flow @ 7 liters due to persistent oxygen saturation being in the lower 80's, after applying this type of high flow, Patients oxygen saturation has slowly come up to her Baseline of 90-92 % saturation, will continue to monitor and assess Electronically signed by Denver Hannon RN on 5/10/2021 at 1:14 AM

## 2021-05-10 NOTE — PROGRESS NOTES
Assisted patient from chair to [facility]. Assisted patient in bathing with set up and standby assist. Patient tolerated well. Mild increase in respirations. O2 was increased from 5L to 9L as O2 level decreased to 84%. Changed linens and assisted patient to bed. Once resting, decreased O2 to 5L. Patient at 88% NC. Will continue to monitor. No other needs at this time.      Electronically signed by Mercedes Hodge on 5/10/2021 at 4:21 PM

## 2021-05-10 NOTE — PROGRESS NOTES
Hospitalist Progress Note  5/10/2021 12:33 PM  Subjective:   Admit Date: 5/9/2021  PCP: Vaibhav Agosto MD    Chief Complaint: fatigue, shortness of breath    Subjective: Patient continues to feel very short of breath, though her other symptoms have improved. She is requiring 9 LPM supplemental oxygen via high-flow cannula and desaturates with slight activity. She has been afebrile for nearly 24 hours. History is otherwise unchanged. Cumulative Hospital History:   5-9: Presents to ED with her  with complaint of worsening fatigue, weakness, shortness of breath x1 week, recently tested positive for COVID-19. Some nausea, vomiting, diarrhea. Serum sodium quite low at 125, lactic acid elevated, hypoxic in ED. Admitted to CCU on dexamethasone, supportive treatment, supplemental oxygen. 5-10: Increasing oxygen requirements overnight. CRP to evaluate for tocilizumab. Scheduled guaifenesin. Add zinc for supportive care. ROS: 14 point review of systems is negative except as specifically addressed above.     DIET LOW SODIUM 2 GM; Dry Tray    Intake/Output Summary (Last 24 hours) at 5/10/2021 1233  Last data filed at 5/10/2021 1202  Gross per 24 hour   Intake 2830 ml   Output 2950 ml   Net -120 ml     Medications:   sodium chloride      dextrose       Current Facility-Administered Medications   Medication Dose Route Frequency Provider Last Rate Last Admin    guaiFENesin (MUCINEX) extended release tablet 1,200 mg  1,200 mg Oral BID [facility], DO   1,200 mg at 05/10/21 4853    zinc sulfate (ZINCATE) capsule 50 mg  50 mg Oral Daily Israel Vee, DO   50 mg at 05/10/21 0841    sodium chloride flush 0.9 % injection 5-40 mL  5-40 mL Intravenous 2 times per day Taniya Cluster, DO   10 mL at 05/10/21 0293    sodium chloride flush 0.9 % injection 5-40 mL  5-40 mL Intravenous PRN [facility] Vee, DO        0.9 % sodium chloride infusion  25 mL Intravenous PRN Taniya Blank, DO        enoxaparin (LOVENOX) injection 30 mg  30 mg Subcutaneous BID Arron Bump, DO   30 mg at 05/10/21 7636    promethazine (PHENERGAN) tablet 12.5 mg  12.5 mg Oral Q6H PRN Arron Bump, DO        Or    ondansetron [facility]) injection 4 mg  4 mg Intravenous Q6H PRN Arron Bump, DO        polyethylene glycol (GLYCOLAX) packet 17 g  17 g Oral Daily PRN Arron Bump, DO        acetaminophen (TYLENOL) tablet 650 mg  650 mg Oral Q6H PRN Arron Bump, DO   650 mg at 05/09/21 1531    Or    acetaminophen (TYLENOL) suppository 650 mg  650 mg Rectal Q6H PRN Arron Bump, DO        guaiFENesin-dextromethorphan (ROBITUSSIN DM) 100-10 MG/5ML syrup 5 mL  5 mL Oral Q4H PRN Arron Bump, DO   5 mL at 05/10/21 0704    dexamethasone (DECADRON) tablet 6 mg  6 mg Oral Daily Israel Vee, DO   6 mg at 05/10/21 2798    Vitamin D (CHOLECALCIFEROL) tablet 2,000 Units  2,000 Units Oral Daily Israel Vee, DO   2,000 Units at 05/10/21 0926    aspirin EC tablet 81 mg  81 mg Oral Daily Israel Vee, DO   81 mg at 05/10/21 0926    famotidine (PEPCID) tablet 20 mg  20 mg Oral BID Israel Vee, DO   20 mg at 05/10/21 8962    cetirizine (ZYRTEC) tablet 10 mg  10 mg Oral Daily Israel Vee, DO   10 mg at 05/10/21 9790    niacin (NIASPAN) extended release tablet 500 mg  500 mg Oral Nightly Israel Vee, DO   500 mg at 05/09/21 2021    metoprolol succinate (TOPROL XL) extended release tablet 50 mg  50 mg Oral Daily Israel Vee, DO   50 mg at 05/10/21 1626    insulin lispro (HUMALOG) injection vial 0-12 Units  0-12 Units Subcutaneous TID WC Israel Vee, DO   2 Units at 05/09/21 1716    insulin lispro (HUMALOG) injection vial 0-6 Units  0-6 Units Subcutaneous Nightly [place] Vee, DO   1 Units at 05/09/21 2021    glucose (GLUTOSE) 40 % oral gel 15 g  15 g Oral PRN [facility], DO        dextrose 50 % IV solution  12.5 g Intravenous PRN Arron Bump, DO        glucagon (rDNA) injection 1 mg  1 mg Intramuscular PRN Arron Bump, DO        dextrose 5 % solution  100 mL/hr Intravenous PRN Arron Bump, DO        potassium chloride (KLOR-CON M) extended release tablet 40 mEq  40 mEq Oral PRN Cheral Donald, DO        Or    potassium bicarb-citric acid (EFFER-K) effervescent tablet 40 mEq  40 mEq Oral PRN Cheral Donald, DO        Or    potassium chloride 10 mEq/100 mL IVPB (Peripheral Line)  10 mEq Intravenous PRN Cheral Donald, DO            Labs:     Recent Labs     05/09/21  0716 05/09/21  1200 05/10/21  0410   WBC 8.4 9.6 8.1   RBC 4.49 4.45 3.96*   HGB 13.3 13.1 11.9*   HCT 40.4 39.7 35.3*   MCV 90.0 89.2 89.1   MCH 29.6 29.4 30.1   MCHC 32.9* 33.0 33.7    263 301     Recent Labs     05/09/21  1200 05/10/21  0021 05/10/21  0410   * 136 137   K 4.1 3.9 3.8   ANIONGAP 10 8 9   CL 96* 100 102   CO2 28 28 26   BUN 6* 7* 7*   CREATININE 0.5 0.5 0.4*   GLUCOSE 134* 167* 129*   CALCIUM 8.3* 7.9* 8.0*     Recent Labs     05/09/21  0715   MG 1.7     Recent Labs     05/09/21  0715 05/09/21  1200 05/10/21  0410   AST 52* 50* 42*   ALT 27 25 20   BILITOT 0.5 0.4 0.3   ALKPHOS 53 52 39     ABGs:No results for input(s): PH, PO2, PCO2, HCO3, BE, O2SAT in the last 72 hours. Troponin T:   Recent Labs     05/09/21  0715   TROPONINI <0.01     INR:   Recent Labs     05/09/21  0740   INR 1.11     Lactic Acid:   Recent Labs     05/09/21  1200   LACTA 2.5*       Objective:   Vitals: BP (!) 122/56   Pulse 83   Temp 98.6 °F (37 °C) (Oral)   Resp 27   Wt 178 lb (80.7 kg)   SpO2 92%   24HR INTAKE/OUTPUT:      Intake/Output Summary (Last 24 hours) at 5/10/2021 1233  Last data filed at 5/10/2021 1202  Gross per 24 hour   Intake 2830 ml   Output 2950 ml   Net -120 ml     Examined remotely to conserve PPE. Auscultatory findings based on nursing examination.   General appearance: alert and cooperative with exam  HEENT: atraumatic, eyes with clear conjunctiva and normal lids, pupils and irises normal, external ears and nose are normal, lips normal  Neck without masses, lympadenopathy, bruit, thyroid normal  Lungs: no increased work of breathing, diminished breath sounds bilaterally  Heart: regular rate and rhythm, S1, S2 normal, no murmur, click, rub or gallop  Abdomen: soft, non-tender; bowel sounds normal; no masses,  no organomegaly  Extremities: extremities normal, atraumatic, no cyanosis or edema  Neurologic: no focal neurologic deficits, normal sensation, alert and oriented, affect and mood appropriate  Skin: no rashes, nodules    Assessment and Plan:   Principal Problem:    Pneumonia due to COVID-19 virus  Active Problems:    Essential hypertension    Type 2 diabetes mellitus (HCC)    Sepsis ([facility] [address]    Hyponatremia    Hypocalcemia    Hypokalemia    Acute respiratory failure with hypoxia ([facility] [address]    Palliative care patient  Resolved Problems:    * No resolved hospital problems. *      Day #2 of 10 dexamethasone. CRP pending for tocilizumab criteria. Scheduled guaifenesin 1200 mg BID. Zinc supplementation. Sodium normalized with fluid bolus. Requiring increasing supplemental oxygen amounts.     Advance Directive: Full Code    DVT prophylaxis: enoxaparin    Discharge planning: TBD      Luis Miguel Divers, DO  [facility] Hospitalist

## 2021-05-10 NOTE — PROGRESS NOTES
De-sats to the 80's with minimal movement (Bed to [facility] and Back) with time comes Back up above 90's % saturation, but takes a good 5-10 mins.  Patient is slightly symptomatic with elevated respirator rate while awake > 30 times a min will continue to monitor and assess Electronically signed by Gabi Bender RN on 5/10/2021 at 5:23 AM

## 2021-05-10 NOTE — ACP (ADVANCE CARE PLANNING)
Advance Care Planning     Advance Care Planning Activator (Inpatient)  Conversation Note      Date of ACP Conversation: 5/10/2021    Conversation Conducted with: Patient    ACP Activator: Josuekory Boucher      Current Designated Health Care Decision Maker:     Primary Decision Maker: Mathew Estrada - Spouse - [phone]    Secondary Decision Maker: Jovita Wyman - Child - [phone]    Secondary Decision Maker: Jazmyne Alexander - Child - [phone]      Care Preferences    Ventilation: \"If you were in your present state of health and suddenly became very ill and were unable to breathe on your own, what would your preference be about the use of a ventilator (breathing machine) if it were available to you? \"      Would the patient desire the use of ventilator (breathing machine)?: Yes    \"If your health worsens and it becomes clear that your chance of recovery is unlikely, what would your preference be about the use of a ventilator (breathing machine) if it were available to you? \"     Would the patient desire the use of ventilator (breathing machine)?: Yes      Resuscitation  \"CPR works best to restart the heart when there is a sudden event, like a heart attack, in someone who is otherwise healthy. Unfortunately, CPR does not typically restart the heart for people who have serious health conditions or who are very sick. \"    \"In the event your heart stopped as a result of an underlying serious health condition, would you want attempts to be made to restart your heart (answer \"yes\" for attempt to resuscitate) or would you prefer a natural death (answer \"no\" for do not attempt to resuscitate)? \" Yes       [] Yes   [x] No   Educated Patient / Chantale Brownlee regarding differences between Advance Directives and portable DNR orders.         Conversation Outcomes:  [x] ACP discussion completed  [x] Existing advance directive reviewed with patient; no changes to patient's previously recorded wishes        Pt says she has a LW at home, saying her son attempted to bring the document yesterday but brought the wrong document. Pt says she and  are each other decision makers and then their two adult children.     Electronically signed by Nelida Wooten on 5/10/2021 at 10:40 AM

## 2021-05-10 NOTE — CONSULTS
**Physician Signature**  This document was electronically signed by: Trinity Pizarro MD  05/10/2021   09:12 AM    **Consult Information**  Member Facility: [address] [place] [address] MRN: [id number]  Visit/Encounter Number: [id number]  Consult ID: [id number]  Facility Time Zone: CT  Date and Time of Request: 05/10/2021 06:33 AM  Requesting Clinician: DR. Prasanth Álvarez  Patient Name: Damian Navarro  YOB: 1950  Gender: Female  Patient identity was confirmed at the beginning of the consult with the   patient/family/staff using two personal identifiers: Patient name and       **Admission**  Admission Date: 2021  Chief reason for ICU admission: COVID - 19  Secondary reasons for ICU admission: Respiratory Failure, Pneumonia    **Core Metrics**  General orienting sentence for patient: 1110 AM: 66year old woman dx'ed with   COVID-19 along with spouse who is now worse than she is. Currently on 9L via   high flow. Remdesivir, decadron.   Chief physiologic deterioration: Increase in FiO2 required by   30%  Is the patient on DVT prophylaxis?: Yes  Prophylaxis type: Pharmacological  DVT Prophylaxis Comments: Lovenox  Is the patient on GI prophylaxis?: Not Indicated  Has this patient reached their nutritional goal?: Yes  Are there current issues with pain management in this patient?:   No  Are there issues with skin integrity?: No  Are there issues with delirium?: No  Has the patient been mobilized?: Yes  Is this patient currently intubated?: No  Are there ethical or care philosophy or family issues?: No  Family Issues Details: Spouse is also in ICU  Do you recommend an in depth evaluation?: No  Do you recommend the patient should: : Continue ICU level of care

## 2021-05-11 LAB
ALBUMIN SERPL-MCNC: 3 G/DL (ref 3.5–5.2)
ALP BLD-CCNC: 56 U/L (ref 35–104)
ALT SERPL-CCNC: 22 U/L (ref 5–33)
ANION GAP SERPL CALCULATED.3IONS-SCNC: 7 MMOL/L (ref 7–19)
AST SERPL-CCNC: 42 U/L (ref 5–32)
ATYPICAL LYMPHOCYTE RELATIVE PERCENT: 10 % (ref 0–8)
BANDED NEUTROPHILS RELATIVE PERCENT: 1 % (ref 0–5)
BASOPHILS ABSOLUTE: 0 K/UL (ref 0–0.2)
BASOPHILS RELATIVE PERCENT: 0 % (ref 0–1)
BILIRUB SERPL-MCNC: 0.3 MG/DL (ref 0.2–1.2)
BUN BLDV-MCNC: 9 MG/DL (ref 8–23)
CALCIUM SERPL-MCNC: 8.3 MG/DL (ref 8.8–10.2)
CHLORIDE BLD-SCNC: 102 MMOL/L (ref 98–111)
CO2: 29 MMOL/L (ref 22–29)
CREAT SERPL-MCNC: 0.4 MG/DL (ref 0.5–0.9)
EOSINOPHILS ABSOLUTE: 0 K/UL (ref 0–0.6)
EOSINOPHILS RELATIVE PERCENT: 0 % (ref 0–5)
GFR AFRICAN AMERICAN: >59
GFR NON-AFRICAN AMERICAN: >60
GLUCOSE BLD-MCNC: 111 MG/DL (ref 70–99)
GLUCOSE BLD-MCNC: 137 MG/DL (ref 74–109)
GLUCOSE BLD-MCNC: 142 MG/DL (ref 70–99)
GLUCOSE BLD-MCNC: 147 MG/DL (ref 70–99)
GLUCOSE BLD-MCNC: 181 MG/DL (ref 70–99)
HCT VFR BLD CALC: 35 % (ref 37–47)
HEMOGLOBIN: 11.5 G/DL (ref 12–16)
IMMATURE GRANULOCYTES #: 0.1 K/UL
LYMPHOCYTES ABSOLUTE: 1.6 K/UL (ref 1.1–4.5)
LYMPHOCYTES RELATIVE PERCENT: 11 % (ref 20–40)
MCH RBC QN AUTO: 30 PG (ref 27–31)
MCHC RBC AUTO-ENTMCNC: 32.9 G/DL (ref 33–37)
MCV RBC AUTO: 91.4 FL (ref 81–99)
MONOCYTES ABSOLUTE: 0.3 K/UL (ref 0–0.9)
MONOCYTES RELATIVE PERCENT: 4 % (ref 0–10)
MONONUCLEAR UNIDENTIFIED CELLS: 1 %
NEUTROPHILS ABSOLUTE: 5.8 K/UL (ref 1.5–7.5)
NEUTROPHILS RELATIVE PERCENT: 73 % (ref 50–65)
OVALOCYTES: ABNORMAL
PDW BLD-RTO: 12.5 % (ref 11.5–14.5)
PERFORMED ON: ABNORMAL
PLATELET # BLD: 357 K/UL (ref 130–400)
PLATELET SLIDE REVIEW: ABNORMAL
PMV BLD AUTO: 10 FL (ref 9.4–12.3)
POTASSIUM REFLEX MAGNESIUM: 3.8 MMOL/L (ref 3.5–5)
RBC # BLD: 3.83 M/UL (ref 4.2–5.4)
SODIUM BLD-SCNC: 138 MMOL/L (ref 136–145)
TOTAL PROTEIN: 6 G/DL (ref 6.6–8.7)
WBC # BLD: 7.8 K/UL (ref 4.8–10.8)

## 2021-05-11 PROCEDURE — 36592 COLLECT BLOOD FROM PICC: CPT

## 2021-05-11 PROCEDURE — 6370000000 HC RX 637 (ALT 250 FOR IP): Performed by: FAMILY MEDICINE

## 2021-05-11 PROCEDURE — 2100000000 HC CCU R&B

## 2021-05-11 PROCEDURE — 85025 COMPLETE CBC W/AUTO DIFF WBC: CPT

## 2021-05-11 PROCEDURE — 80053 COMPREHEN METABOLIC PANEL: CPT

## 2021-05-11 PROCEDURE — 97162 PT EVAL MOD COMPLEX 30 MIN: CPT

## 2021-05-11 PROCEDURE — 2700000000 HC OXYGEN THERAPY PER DAY

## 2021-05-11 PROCEDURE — 6360000002 HC RX W HCPCS: Performed by: FAMILY MEDICINE

## 2021-05-11 PROCEDURE — 2580000003 HC RX 258: Performed by: FAMILY MEDICINE

## 2021-05-11 PROCEDURE — 97166 OT EVAL MOD COMPLEX 45 MIN: CPT

## 2021-05-11 PROCEDURE — 82947 ASSAY GLUCOSE BLOOD QUANT: CPT

## 2021-05-11 PROCEDURE — 97535 SELF CARE MNGMENT TRAINING: CPT

## 2021-05-11 PROCEDURE — 97530 THERAPEUTIC ACTIVITIES: CPT

## 2021-05-11 RX ADMIN — Medication 50 MG: at 07:45

## 2021-05-11 RX ADMIN — DEXAMETHASONE 6 MG: 2 TABLET ORAL at 07:45

## 2021-05-11 RX ADMIN — INSULIN LISPRO 2 UNITS: 100 INJECTION, SOLUTION INTRAVENOUS; SUBCUTANEOUS at 11:46

## 2021-05-11 RX ADMIN — ENOXAPARIN SODIUM 30 MG: 30 INJECTION SUBCUTANEOUS at 07:46

## 2021-05-11 RX ADMIN — SODIUM CHLORIDE, PRESERVATIVE FREE 10 ML: 5 INJECTION INTRAVENOUS at 07:46

## 2021-05-11 RX ADMIN — INSULIN LISPRO 2 UNITS: 100 INJECTION, SOLUTION INTRAVENOUS; SUBCUTANEOUS at 17:17

## 2021-05-11 RX ADMIN — ASPIRIN 81 MG: 81 TABLET, COATED ORAL at 07:46

## 2021-05-11 RX ADMIN — METOPROLOL SUCCINATE 50 MG: 50 TABLET, EXTENDED RELEASE ORAL at 07:46

## 2021-05-11 RX ADMIN — GUAIFENESIN 1200 MG: 600 TABLET, EXTENDED RELEASE ORAL at 20:29

## 2021-05-11 RX ADMIN — CETIRIZINE HYDROCHLORIDE 10 MG: 10 TABLET, FILM COATED ORAL at 07:46

## 2021-05-11 RX ADMIN — ENOXAPARIN SODIUM 30 MG: 30 INJECTION SUBCUTANEOUS at 20:29

## 2021-05-11 RX ADMIN — SODIUM CHLORIDE, PRESERVATIVE FREE 10 ML: 5 INJECTION INTRAVENOUS at 20:29

## 2021-05-11 RX ADMIN — NIACIN 500 MG: 500 TABLET, EXTENDED RELEASE ORAL at 20:29

## 2021-05-11 RX ADMIN — FAMOTIDINE 20 MG: 20 TABLET, FILM COATED ORAL at 20:29

## 2021-05-11 RX ADMIN — GUAIFENESIN 1200 MG: 600 TABLET, EXTENDED RELEASE ORAL at 07:45

## 2021-05-11 RX ADMIN — ACETAMINOPHEN 650 MG: 325 TABLET ORAL at 20:36

## 2021-05-11 RX ADMIN — FAMOTIDINE 20 MG: 20 TABLET, FILM COATED ORAL at 07:45

## 2021-05-11 RX ADMIN — Medication 2000 UNITS: at 07:45

## 2021-05-11 ASSESSMENT — PAIN DESCRIPTION - LOCATION: LOCATION: HEAD

## 2021-05-11 ASSESSMENT — PAIN SCALES - GENERAL
PAINLEVEL_OUTOF10: 0
PAINLEVEL_OUTOF10: 3
PAINLEVEL_OUTOF10: 0

## 2021-05-11 NOTE — PROGRESS NOTES
Occupational Therapy   Occupational Therapy Initial Assessment  Date: 2021   Patient Name: Kwaku Walden  MRN: [id number]     : 1950    Date of Service: 2021    Discharge Recommendations:  Continue to assess pending progress       Assessment   Assessment: Evaluation completed and tx initiated. Will continue to follow. Treatment Diagnosis: Pneumonia due to Covid 19  REQUIRES OT FOLLOW UP: Yes  Activity Tolerance  Activity Tolerance: Patient Tolerated treatment well  Safety Devices  Safety Devices in place: Yes  Type of devices: Call light within reach(nurse supervising through glass door, aware patient does not have a personal alarm)           Patient Diagnosis(es): The primary encounter diagnosis was Acute respiratory failure with hypoxia ([facility] [address] A diagnosis of COVID-19 was also pertinent to this visit. has a past medical history of Diabetes mellitus ([facility] [address] Hypertension, and Palliative care patient. has a past surgical history that includes Lithotripsy; Hysterectomy; and Cataract removal.    Treatment Diagnosis: Pneumonia due to Covid 19      Restrictions  Restrictions/Precautions  Restrictions/Precautions: Fall Risk, Isolation, Contact Precautions  Required Braces or Orthoses?: No  Position Activity Restriction  Other position/activity restrictions: droplet prec due to covid 19    Subjective   General  Chart Reviewed: Yes  Patient assessed for rehabilitation services?: Yes  Family / Caregiver Present: No  Patient Currently in Pain: No  Pain Assessment  Pain Assessment: 0-10  Pain Level: 0  Non-Pharmaceutical Pain Intervention(s): Repositioned; Ambulation/Increased Activity  Response to Pain Intervention: Patient Satisfied  Vital Signs  Pulse: 77  Resp: 24  BP: 132/65  MAP (mmHg): 81  Patient Position: Up in chair  Patient Currently in Pain: No  Oxygen Therapy  SpO2: 93 %  O2 Device: High flow nasal cannula  O2 Flow Rate (L/min): 8 L/min  Social/Functional History  Social/Functional History  Lives With: Spouse  Type of Home: House  Home Layout: One level  Home Access: Stairs to enter without rails  Entrance Stairs - Number of Steps: 1  ADL Assistance: Independent  Homemaking Assistance: Independent  Ambulation Assistance: Independent  Transfer Assistance: Independent  Additional Comments: reports having children in [facility]. Objective        Orientation  Overall Orientation Status: Within Normal Limits  Observation/Palpation  Posture: Good  Observation: O2, telemetry, O2 sat monitor, BP cuff. Patient up in recliner upon arrival. Nurse srinath therapy. Balance  Sitting Balance: Independent  Standing Balance: Stand by assistance  Toilet Transfers  Toilet Transfer: Stand by assistance  Shower Transfers  Shower Transfers: Stand by assistance  ADL  Feeding: Independent  Grooming: Independent  UE Bathing: Supervision;Setup  LE Bathing: Stand by assistance  UE Dressing: Independent;Setup  LE Dressing: Stand by assistance  Toileting: Stand by assistance  Additional Comments: NO ADAPTIVE EQUIPMENT NEEDS IDENTIFIED  Coordination  Movements Are Fluid And Coordinated: Yes  Quality of Movement Other  Comment: NO FM DEFICITS        Transfers  Stand Step Transfers: Stand by assistance     Cognition  Overall Cognitive Status: WNL                 LUE PROM (degrees)  LUE PROM: WNL  LUE AROM (degrees)  LUE AROM : WNL  RUE PROM (degrees)  RUE PROM: WNL  RUE AROM (degrees)  RUE AROM : WNL  LUE Strength  Gross LUE Strength: WFL  RUE Strength  Gross RUE Strength: WFL                 Tx initiated: Therapeutic activity and positioning training, pacing and energy conservation training, balance activities. (15 mins)  Plan   Plan  Times per week: 3-5    G-Code     OutComes Score                                                  AM-PAC Score             Goals  Short term goals  Short term goal 1: Independent with dressing, toileting. Short term goal 2:  Independent with therapeutic exercise program and positioning strategies. Short term goal 3:  Independent with transfers, standing and light ambulatory ADL       Therapy Time   Individual Concurrent Group Co-treatment   Time In           Time Out           Minutes                   Rosemary Malin OT Electronically signed by Rosemary Malin OT on 5/11/2021 at 2:44 PM

## 2021-05-11 NOTE — CARE COORDINATION
Pulse Ox meter provided to patient through [facility]. Meter given to patient's nurse, Brooke Delong.   Electronically signed by More Manriquez RN on 5/11/2021 at 11:46 AM

## 2021-05-11 NOTE — PROGRESS NOTES
Physical Therapy    Facility/Department: [facility] CORONARY CARE UNIT  Initial Assessment    NAME: Darling Hooper  : 1950  MRN: [id number]    Date of Service: 2021    Discharge Recommendations:  Continue to assess pending progress, Patient would benefit from continued therapy after discharge, 24 hour supervision or assist        Assessment   Body structures, Functions, Activity limitations: Decreased functional mobility ; Decreased strength;Decreased endurance;Decreased balance  Assessment: Pt. will benefit from cont. PT to decrease impairments. Pt. a fall risk due to low endurance and should not attempt longer gait distances at this time. Pt. needs to keep O2 on while amb. Pt. will benefit from sitting up in chair and gradually increasing activity. Treatment Diagnosis: deconditioning  Prognosis: Good  Decision Making: Medium Complexity  PT Education: Goals;PT Role;Plan of Care;Transfer Training;Functional Mobility Training  Patient Education: use of call light for staff A  Barriers to Learning: none noted  REQUIRES PT FOLLOW UP: Yes  Activity Tolerance  Activity Tolerance: Patient Tolerated treatment well;Patient limited by fatigue  Activity Tolerance: tolerated well considering respiratory status       Patient Diagnosis(es): The primary encounter diagnosis was Acute respiratory failure with hypoxia ([address] A diagnosis of COVID-19 was also pertinent to this visit. has a past medical history of Diabetes mellitus ([address] Hypertension, and Palliative care patient. has a past surgical history that includes Lithotripsy;  Hysterectomy; and Cataract removal.    Restrictions  Restrictions/Precautions  Restrictions/Precautions: Fall Risk, Isolation, Contact Precautions  Required Braces or Orthoses?: No  Position Activity Restriction  Other position/activity restrictions: droplet prec due to covid 19  Vision/Hearing        Subjective  General  Chart Reviewed: Yes  Patient assessed for rehabilitation services?: Yes  Response To Previous Treatment: Not applicable  Family / Caregiver Present: No(pt's spouse in [place])  Referring Practitioner: Stan Bejarano DO  Referral Date : 05/10/21  Diagnosis: PNA due to covid 23, acute respiratory failure with hypoxia  Follows Commands: Within Functional Limits  General Comment  Comments: RNAlexey Zellwood PT. Subjective  Subjective: Pt. willing to work with therapy. States she just feels weak and gets winded easily. Pain Screening  Patient Currently in Pain: No  Pain Assessment  Pain Assessment: 0-10  Pain Level: 0  Non-Pharmaceutical Pain Intervention(s): Repositioned; Ambulation/Increased Activity  Response to Pain Intervention: Patient Satisfied  Vital Signs  BP: 138/72(11:10 AM)  Patient Position: Up in chair  Patient Currently in Pain: No  Oxygen Therapy  SpO2: 93 %  O2 Device: High flow nasal cannula  O2 Flow Rate (L/min): 5 L/min  Pre Treatment Pain Screening  Intervention List: Patient able to continue with treatment    Orientation  Orientation  Overall Orientation Status: Within Functional Limits  Social/Functional History  Social/Functional History  Lives With: Spouse  Type of Home: House  Home Layout: One level  Home Access: Stairs to enter without rails  Entrance Stairs - Number of Steps: 1  ADL Assistance: Independent  Homemaking Assistance: Independent  Ambulation Assistance: Independent  Transfer Assistance: Independent  Additional Comments: reports having children in [facility].   Cognition   Cognition  Overall Cognitive Status: WFL    Objective     Observation/Palpation  Posture: Good  Observation: O2, telemetry, O2 sat monitor, BP cuff    AROM RLE (degrees)  RLE AROM: WFL  AROM LLE (degrees)  LLE AROM : WFL  AROM RUE (degrees)  RUE AROM : WFL  AROM LUE (degrees)  LUE AROM : WFL  Strength RLE  Strength RLE: WFL  Comment: grossly 4/5  Strength LLE  Strength LLE: WFL  Comment: grossly 4/5        Bed mobility  Supine to Sit: Supervision  Sit to Supine: Unable to assess  Comment: pt sat on EOB x 2 mins CGA  Transfers  Sit to Stand: Minimal Assistance  Stand to sit: Minimal Assistance  Ambulation  Ambulation?: Yes  Ambulation 1  Surface: level tile  Device: No Device;Hand-Held Assist  Assistance: Minimal assistance  Quality of Gait: unsteady  Gait Deviations: Slow Andra;Decreased step length;Decreased step height  Distance: 5' forward and 2' backward to chair     Balance  Posture: Good  Sitting - Static: Good;+  Sitting - Dynamic: Good;-  Standing - Static: Fair;+  Standing - Dynamic: Poor;+  Exercises  Knee Long Arc Quad: x10  Ankle Pumps: x20     Plan   Plan  Times per week: 3-7  Times per day: Daily  Plan weeks: 2  Current Treatment Recommendations: Strengthening, Balance Training, Functional Mobility Training, Transfer Training, Endurance Training, Safety Education & Training, Positioning, Equipment Evaluation, Education, & procurement, Patient/Caregiver Education & Training  Plan Comment: cont. PT per POC.   Safety Devices  Type of devices: Gait belt, Patient at risk for falls, Nurse notified, Call light within reach, Left in bed, Left in chair(reclined)    G-Code       OutComes Score                                                  AM-PAC Score             Goals  Short term goals  Time Frame for Short term goals: 2 wks  Short term goal 1: supine to sit indep  Short term goal 2: sit to stand indep  Short term goal 3: amb. 100' with or without RW SBA  Patient Goals   Patient goals : go home soon       Therapy Time   Individual Concurrent Group Co-treatment   Time In           Time Out           Minutes                   Nico Rodriguez PT    Electronically signed by Nico Rodriguez PT on 5/11/2021 at 1:52 PM

## 2021-05-11 NOTE — CONSULTS
**Physician Signature**  This document was electronically signed by: Morales Neves MD  2021   09:38 AM    **Consult Information**  Member Facility: [address] [place] [address] MRN: [id number]  Visit/Encounter Number: [id number]  Consult ID: [id number]  Facility Time Zone: CT  Date and Time of Request: 2021 09:02 AM  Requesting Clinician: DR. Mary Ratliff  Patient Name: Evaristo Menon  YOB: 1950  Gender: Female  Patient identity was confirmed at the beginning of the consult with the   patient/family/staff using two personal identifiers: Patient name and       **Admission**  Admission Date: 2021  Chief reason for ICU admission: COVID - 19  Secondary reasons for ICU admission: Respiratory Failure, Pneumonia    **Core Metrics**  General orienting sentence for patient: 1110 AM: 66year old woman dx'ed with   COVID-19 along with spouse who is now worse than she is. Currently on 9L via   high flow. Remdesivir, decadron. Nasal cannula. Chief physiologic deterioration: Increase in FiO2 required by   30%  Is the patient on DVT prophylaxis?: Yes  Prophylaxis type: Pharmacological  DVT Prophylaxis Comments: Lovenox  Is the patient on GI prophylaxis?: Not Indicated  Has this patient reached their nutritional goal?: Yes  Are there current issues with pain management in this patient?:   No  Are there issues with skin integrity?: No  Are there issues with delirium?: No  Has the patient been mobilized?: Yes  Mobilized Details: P. T. working with patient  Is this patient currently intubated?: No  Are there ethical or care philosophy or family issues?: No  Family Issues Details: Spouse is also in ICU  Do you recommend an in depth evaluation?: No  Do you recommend the patient should: : Be downgraded/transitioned out of   ICU

## 2021-05-11 NOTE — PROGRESS NOTES
Pt's oxygen needs assessed with use of NC @ 6L. Pt's O2 sat initially 88-89%, then increased to 91%. O2 sat currently 90% as pt is sitting up on side of the bed while finishing up dinner. Asked pt if anything about her breathing felt different; she stated the only difference that she notices is that it more comfortable. Will continue to monitor.      Electronically signed by Juan Edwards RN on 5/11/2021 at 5:42 PM

## 2021-05-12 PROBLEM — T50.905A BRADYCARDIA, DRUG INDUCED: Status: ACTIVE | Noted: 2021-05-12

## 2021-05-12 PROBLEM — R00.1 BRADYCARDIA, DRUG INDUCED: Status: ACTIVE | Noted: 2021-05-12

## 2021-05-12 LAB
ALBUMIN SERPL-MCNC: 3.3 G/DL (ref 3.5–5.2)
ALP BLD-CCNC: 59 U/L (ref 35–104)
ALT SERPL-CCNC: 26 U/L (ref 5–33)
ANION GAP SERPL CALCULATED.3IONS-SCNC: 9 MMOL/L (ref 7–19)
AST SERPL-CCNC: 40 U/L (ref 5–32)
ATYPICAL LYMPHOCYTE RELATIVE PERCENT: 2 % (ref 0–8)
BASOPHILS ABSOLUTE: 0 K/UL (ref 0–0.2)
BASOPHILS RELATIVE PERCENT: 0 % (ref 0–1)
BILIRUB SERPL-MCNC: 0.4 MG/DL (ref 0.2–1.2)
BUN BLDV-MCNC: 12 MG/DL (ref 8–23)
CALCIUM SERPL-MCNC: 8.7 MG/DL (ref 8.8–10.2)
CHLORIDE BLD-SCNC: 102 MMOL/L (ref 98–111)
CO2: 29 MMOL/L (ref 22–29)
CREAT SERPL-MCNC: 0.5 MG/DL (ref 0.5–0.9)
EOSINOPHILS ABSOLUTE: 0 K/UL (ref 0–0.6)
EOSINOPHILS RELATIVE PERCENT: 0 % (ref 0–5)
GFR AFRICAN AMERICAN: >59
GFR NON-AFRICAN AMERICAN: >60
GLUCOSE BLD-MCNC: 111 MG/DL (ref 74–109)
GLUCOSE BLD-MCNC: 121 MG/DL (ref 70–99)
GLUCOSE BLD-MCNC: 134 MG/DL (ref 70–99)
GLUCOSE BLD-MCNC: 150 MG/DL (ref 70–99)
GLUCOSE BLD-MCNC: 92 MG/DL (ref 70–99)
HCT VFR BLD CALC: 37.3 % (ref 37–47)
HEMOGLOBIN: 12.5 G/DL (ref 12–16)
IMMATURE GRANULOCYTES #: 0.1 K/UL
LYMPHOCYTES ABSOLUTE: 2.9 K/UL (ref 1.1–4.5)
LYMPHOCYTES RELATIVE PERCENT: 31 % (ref 20–40)
MCH RBC QN AUTO: 30.3 PG (ref 27–31)
MCHC RBC AUTO-ENTMCNC: 33.5 G/DL (ref 33–37)
MCV RBC AUTO: 90.5 FL (ref 81–99)
MONOCYTES ABSOLUTE: 0.3 K/UL (ref 0–0.9)
MONOCYTES RELATIVE PERCENT: 3 % (ref 0–10)
NEUTROPHILS ABSOLUTE: 5.6 K/UL (ref 1.5–7.5)
NEUTROPHILS RELATIVE PERCENT: 64 % (ref 50–65)
PDW BLD-RTO: 12.5 % (ref 11.5–14.5)
PERFORMED ON: ABNORMAL
PERFORMED ON: NORMAL
PLATELET # BLD: 454 K/UL (ref 130–400)
PLATELET SLIDE REVIEW: ABNORMAL
PMV BLD AUTO: 9.7 FL (ref 9.4–12.3)
POTASSIUM REFLEX MAGNESIUM: 4.1 MMOL/L (ref 3.5–5)
RBC # BLD: 4.12 M/UL (ref 4.2–5.4)
SODIUM BLD-SCNC: 140 MMOL/L (ref 136–145)
TOTAL PROTEIN: 6.6 G/DL (ref 6.6–8.7)
WBC # BLD: 8.7 K/UL (ref 4.8–10.8)

## 2021-05-12 PROCEDURE — 6370000000 HC RX 637 (ALT 250 FOR IP): Performed by: FAMILY MEDICINE

## 2021-05-12 PROCEDURE — 2700000000 HC OXYGEN THERAPY PER DAY

## 2021-05-12 PROCEDURE — 6370000000 HC RX 637 (ALT 250 FOR IP): Performed by: HOSPITALIST

## 2021-05-12 PROCEDURE — 82947 ASSAY GLUCOSE BLOOD QUANT: CPT

## 2021-05-12 PROCEDURE — 97116 GAIT TRAINING THERAPY: CPT

## 2021-05-12 PROCEDURE — 2580000003 HC RX 258: Performed by: FAMILY MEDICINE

## 2021-05-12 PROCEDURE — 2100000000 HC CCU R&B

## 2021-05-12 PROCEDURE — 97530 THERAPEUTIC ACTIVITIES: CPT

## 2021-05-12 PROCEDURE — 80053 COMPREHEN METABOLIC PANEL: CPT

## 2021-05-12 PROCEDURE — 6360000002 HC RX W HCPCS: Performed by: FAMILY MEDICINE

## 2021-05-12 PROCEDURE — 97110 THERAPEUTIC EXERCISES: CPT

## 2021-05-12 PROCEDURE — 85025 COMPLETE CBC W/AUTO DIFF WBC: CPT

## 2021-05-12 RX ORDER — METOPROLOL SUCCINATE 25 MG/1
25 TABLET, EXTENDED RELEASE ORAL DAILY
Status: DISCONTINUED | OUTPATIENT
Start: 2021-05-12 | End: 2021-05-15 | Stop reason: HOSPADM

## 2021-05-12 RX ORDER — MECOBALAMIN 5000 MCG
5 TABLET,DISINTEGRATING ORAL NIGHTLY
Status: DISCONTINUED | OUTPATIENT
Start: 2021-05-12 | End: 2021-05-15 | Stop reason: HOSPADM

## 2021-05-12 RX ADMIN — CETIRIZINE HYDROCHLORIDE 10 MG: 10 TABLET, FILM COATED ORAL at 08:11

## 2021-05-12 RX ADMIN — SODIUM CHLORIDE, PRESERVATIVE FREE 10 ML: 5 INJECTION INTRAVENOUS at 09:00

## 2021-05-12 RX ADMIN — DEXAMETHASONE 6 MG: 2 TABLET ORAL at 08:11

## 2021-05-12 RX ADMIN — ACETAMINOPHEN 650 MG: 325 TABLET ORAL at 21:25

## 2021-05-12 RX ADMIN — FAMOTIDINE 20 MG: 20 TABLET, FILM COATED ORAL at 21:24

## 2021-05-12 RX ADMIN — SODIUM CHLORIDE, PRESERVATIVE FREE 10 ML: 5 INJECTION INTRAVENOUS at 21:25

## 2021-05-12 RX ADMIN — ENOXAPARIN SODIUM 30 MG: 30 INJECTION SUBCUTANEOUS at 08:14

## 2021-05-12 RX ADMIN — ASPIRIN 81 MG: 81 TABLET, COATED ORAL at 08:11

## 2021-05-12 RX ADMIN — GUAIFENESIN 1200 MG: 600 TABLET, EXTENDED RELEASE ORAL at 08:11

## 2021-05-12 RX ADMIN — Medication 2000 UNITS: at 08:11

## 2021-05-12 RX ADMIN — METOPROLOL SUCCINATE 25 MG: 50 TABLET, EXTENDED RELEASE ORAL at 09:20

## 2021-05-12 RX ADMIN — NIACIN 500 MG: 500 TABLET, EXTENDED RELEASE ORAL at 21:24

## 2021-05-12 RX ADMIN — GUAIFENESIN 1200 MG: 600 TABLET, EXTENDED RELEASE ORAL at 21:25

## 2021-05-12 RX ADMIN — Medication 5 MG: at 21:24

## 2021-05-12 RX ADMIN — ENOXAPARIN SODIUM 30 MG: 30 INJECTION SUBCUTANEOUS at 21:25

## 2021-05-12 RX ADMIN — Medication 50 MG: at 08:11

## 2021-05-12 RX ADMIN — FAMOTIDINE 20 MG: 20 TABLET, FILM COATED ORAL at 08:11

## 2021-05-12 RX ADMIN — POLYETHYLENE GLYCOL 3350 17 G: 17 POWDER, FOR SOLUTION ORAL at 11:16

## 2021-05-12 ASSESSMENT — PAIN SCALES - GENERAL: PAINLEVEL_OUTOF10: 0

## 2021-05-12 NOTE — PROGRESS NOTES
Hospitalist Progress Note  5/12/2021 11:53 AM  Subjective:   Admit Date: 5/9/2021  PCP: Hesham Elizalde MD    Chief Complaint: fatigue, shortness of breath    Subjective: Patient feels about the same today without new complaints. She was slightly bradycardic into the 40s overnight and this morning without symptoms. Required increased flow rates last night but back down this morning. History is otherwise unchanged. Cumulative Hospital History:   5-9: Presents to ED with her  with complaint of worsening fatigue, weakness, shortness of breath x1 week, recently tested positive for COVID-19. Some nausea, vomiting, diarrhea. Serum sodium quite low at 125, lactic acid elevated, hypoxic in ED. Admitted to CCU on dexamethasone, supportive treatment, supplemental oxygen. 5-10: Increasing oxygen requirements overnight. CRP elevated, tocilizumab given. Scheduled guaifenesin. Add zinc for supportive care. 5-11: Down to 5 LPM high-flow for most of the last 24 hours, though she desaturates with light activity. Feels a little better. 5-12: Bradycardia, asymptomatic but will decrease metoprolol dose by 50%. Continues to feel better since admission. Required increased flow rates overnight but back down to 6 LPM via nasal cannula this morning. ROS: 14 point review of systems is negative except as specifically addressed above.     DIET LOW SODIUM 2 GM; Dry Tray; Safety Tray; Safety Tray (Disposables)    Intake/Output Summary (Last 24 hours) at 5/12/2021 1153  Last data filed at 5/12/2021 1127  Gross per 24 hour   Intake 650 ml   Output 1450 ml   Net -800 ml     Medications:   sodium chloride      dextrose       Current Facility-Administered Medications   Medication Dose Route Frequency Provider Last Rate Last Admin    metoprolol succinate (TOPROL XL) extended release tablet 25 mg  25 mg Oral Daily Israel Vee, DO   25 mg at 05/12/21 0920    guaiFENesin (MUCINEX) extended release tablet 1,200 mg  1,200 mg Oral BID Catrachito Loo Vee, DO   1,200 mg at 05/12/21 0811    zinc sulfate (ZINCATE) capsule 50 mg  50 mg Oral Daily [place] Vee, DO   50 mg at 05/12/21 5348    sodium chloride flush 0.9 % injection 5-40 mL  5-40 mL Intravenous 2 times per day Polo Conradi, DO   10 mL at 05/12/21 0900    sodium chloride flush 0.9 % injection 5-40 mL  5-40 mL Intravenous PRN [facility] Ward, DO        0.9 % sodium chloride infusion  25 mL Intravenous PRN Polo Conradi, DO        enoxaparin (LOVENOX) injection 30 mg  30 mg Subcutaneous BID [place] Vee, DO   30 mg at 05/12/21 0814    promethazine (PHENERGAN) tablet 12.5 mg  12.5 mg Oral Q6H PRN Polo Conradi, DO        Or    ondansetron [facility]) injection 4 mg  4 mg Intravenous Q6H PRN Polo Conradi, DO        polyethylene glycol (GLYCOLAX) packet 17 g  17 g Oral Daily PRN [facility], DO   17 g at 05/12/21 1116    acetaminophen (TYLENOL) tablet 650 mg  650 mg Oral Q6H PRN [facility], DO   650 mg at 05/11/21 2036    Or    acetaminophen (TYLENOL) suppository 650 mg  650 mg Rectal Q6H PRN Polo Conradi, DO        guaiFENesin-dextromethorphan (ROBITUSSIN DM) 100-10 MG/5ML syrup 5 mL  5 mL Oral Q4H PRN Polo Conradi, DO   5 mL at 05/10/21 0704    dexamethasone (DECADRON) tablet 6 mg  6 mg Oral Daily [facility], DO   6 mg at 05/12/21 9403    Vitamin D (CHOLECALCIFEROL) tablet 2,000 Units  2,000 Units Oral Daily [facility], DO   2,000 Units at 05/12/21 0811    aspirin EC tablet 81 mg  81 mg Oral Daily [facility], DO   81 mg at 05/12/21 0811    famotidine (PEPCID) tablet 20 mg  20 mg Oral BID [facility], DO   20 mg at 05/12/21 3106    cetirizine (ZYRTEC) tablet 10 mg  10 mg Oral Daily [place] Vee, DO   10 mg at 05/12/21 8127    niacin (NIASPAN) extended release tablet 500 mg  500 mg Oral Nightly Israel Vee, DO   500 mg at 05/11/21 2029    insulin lispro (HUMALOG) injection vial 0-12 Units  0-12 Units Subcutaneous TID [facility], DO   2 Units at 05/11/21 1717    insulin lispro (HUMALOG) injection vial 0-6 Units  0-6 Units Subcutaneous Nightly Suellyn Divers, DO   1 Units at 05/11/21 2028    glucose (GLUTOSE) 40 % oral gel 15 g  15 g Oral PRN Suellyn Divers, DO        dextrose 50 % IV solution  12.5 g Intravenous PRN Suellyn Divers, DO        glucagon (rDNA) injection 1 mg  1 mg Intramuscular PRN Suellyn Divers, DO        dextrose 5 % solution  100 mL/hr Intravenous PRN Suellyn Divers, DO        potassium chloride (KLOR-CON M) extended release tablet 40 mEq  40 mEq Oral PRN Suellyn Divers, DO        Or    potassium bicarb-citric acid (EFFER-K) effervescent tablet 40 mEq  40 mEq Oral PRN Suellyn Divers, DO        Or    potassium chloride 10 mEq/100 mL IVPB (Peripheral Line)  10 mEq Intravenous PRN Suellyn Divers, DO            Labs:     Recent Labs     05/10/21  0410 05/11/21 0445 05/12/21 0442   WBC 8.1 7.8 8.7   RBC 3.96* 3.83* 4.12*   HGB 11.9* 11.5* 12.5   HCT 35.3* 35.0* 37.3   MCV 89.1 91.4 90.5   MCH 30.1 30.0 30.3   MCHC 33.7 32.9* 33.5    357 454*     Recent Labs     05/10/21  1045 05/11/21 0445 05/12/21 0442    138 140   K 3.9 3.8 4.1   ANIONGAP 9 7 9    102 102   CO2 28 29 29   BUN 7* 9 12   CREATININE 0.5 0.4* 0.5   GLUCOSE 152* 137* 111*   CALCIUM 8.4* 8.3* 8.7*     No results for input(s): MG, PHOS in the last 72 hours. Recent Labs     05/10/21  0410 05/11/21 0445 05/12/21 0442   AST 42* 42* 40*   ALT 20 22 26   BILITOT 0.3 0.3 0.4   ALKPHOS 45 56 59     ABGs:No results for input(s): PH, PO2, PCO2, HCO3, BE, O2SAT in the last 72 hours. Troponin T:   No results for input(s): TROPONINI in the last 72 hours. INR:   No results for input(s): INR in the last 72 hours.   Lactic Acid:   Recent Labs     05/09/21  1200   LACTA 2.5*       Objective:   Vitals: BP (!) 143/65   Pulse 61   Temp 96.9 °F (36.1 °C) (Temporal)   Resp 25   Ht 5' 2\" (1.575 m)   Wt 178 lb (80.7 kg)   SpO2 92%   BMI 32.56 kg/m²   24HR INTAKE/OUTPUT:      Intake/Output Summary (Last 24 hours) at 5/12/2021 1153  Last data filed at 5/12/2021 1127  Gross per 24 hour   Intake

## 2021-05-12 NOTE — CONSULTS
**Physician Signature**  This document was electronically signed by: Alina Lux MD  2021   09:13 AM    **Consult Information**  Member Facility: [address] [place] [address] MRN: [id number]  Visit/Encounter Number: [id number]  Consult ID: [id number]  Facility Time Zone: CT  Date and Time of Request: 2021 06:29 AM  Requesting Clinician: DR. Med Maddox  Patient Name: Mamadou Jaimes  YOB: 1950  Gender: Female  Patient identity was confirmed at the beginning of the consult with the   patient/family/staff using two personal identifiers: Patient name and       **Admission**  Admission Date: 2021  Chief reason for ICU admission: COVID - 19  Secondary reasons for ICU admission: Respiratory Failure, Pneumonia    **Core Metrics**  General orienting sentence for patient: 1110 AM: 66year old woman dx'ed with   COVID-19 along with spouse who is now worse than she is. Currently on 9L via   high flow. Remdesivir, decadron. Nasal cannula. desaturation and long time to   recover. Trying to self prone  Chief physiologic deterioration: Increase in FiO2 required by   30%  Is the patient on DVT prophylaxis?: Yes  Prophylaxis type: Pharmacological  DVT Prophylaxis Comments: Lovenox  Is the patient on GI prophylaxis?: Not Indicated  Has this patient reached their nutritional goal?: Yes  Are there current issues with pain management in this patient?:   No  Are there issues with skin integrity?: No  Are there issues with delirium?: No  Has the patient been mobilized?: Yes  Mobilized Details: P. T. working with patient  Is this patient currently intubated?: No  Are there ethical or care philosophy or family issues?: No  Family Issues Details: Spouse is also in ICU  Do you recommend an in depth evaluation?: No  Do you recommend the patient should: : Continue ICU level of care

## 2021-05-12 NOTE — PROGRESS NOTES
Occupational Therapy     05/12/21 1500   Restrictions/Precautions   Restrictions/Precautions Fall Risk;Isolation;Contact Precautions   Required Braces or Orthoses? No   Position Activity Restriction   Other position/activity restrictions droplet prec due to covid 19   General   Chart Reviewed Yes   Patient assessed for rehabilitation services? Yes   Response to previous treatment Patient with no complaints from previous session   Family / Caregiver Present No   Subjective   Subjective Pt agreeable to participate and sit up in recliner for lunch this date. Pain Assessment   Patient Currently in Pain No   ADL   Feeding Independent   Grooming Independent   UE Dressing Independent;Setup   LE Dressing Stand by assistance   Toileting Stand by assistance   Balance   Sitting Balance Independent   Standing Balance Stand by assistance   Bed mobility   Rolling to Right Stand by assistance   Supine to Sit Stand by assistance   Transfers   Stand Step Transfers Stand by assistance   Sit to stand Stand by assistance   Stand to sit Stand by assistance   Toilet Transfers   Toilet - Technique Stand step   Equipment Used Raised toilet seat with rails   Toilet Transfer Stand by assistance   Assessment   Performance deficits / Impairments Decreased functional mobility ; Decreased ADL status; Decreased strength;Decreased endurance;Decreased balance   Assessment Pt tolerated tx well with increased rest breaks. Treatment Diagnosis Pneumonia due to Covid 19   Prognosis Good   REQUIRES OT FOLLOW UP Yes   Treatment Initiated  Tx focused on sitting EOB and transfer from bed to reclining chair to increase time OOB this date. Discharge Recommendations Continue to assess pending progress   Activity Tolerance   Activity Tolerance Patient Tolerated treatment well   Activity Tolerance Required increased rest breaks and paced tx.    Safety Devices   Safety Devices in place Yes   Type of devices Call light within reach   Plan   Times per week 3-5   Times per day Daily   Plan Comment Plans to return home with .     Electronically signed by DONALD Recinos on 5/12/2021 at 3:08 PM

## 2021-05-12 NOTE — PROGRESS NOTES
Physical Therapy  Lencho Harris  [id number]     05/12/21 1216   Subjective   Subjective Agrees to work with therapy. Bed Mobility   Supine to Sit Stand by assistance   Transfers   Sit to Stand Contact guard assistance   Stand to sit Stand by assistance   Ambulation   Ambulation? Yes   Ambulation 1   Surface level tile   Device No Device   Assistance Contact guard assistance   Quality of Gait slow, steady, steps to the chair   Distance 5'   Other Activities   Comment Patient did well with therapy. Patient agreed to sit up in recliner, B LE ex's in sitting, positioned for comfort with all needs in reach. Short term goals   Time Frame for Short term goals 2 wks   Short term goal 1 supine to sit indep   Short term goal 2 sit to stand indep   Short term goal 3 amb. 100' with or without RW SBA   Activity Tolerance   Activity Tolerance Patient Tolerated treatment well   Safety Devices   Type of devices Call light within reach; Left in chair   Electronically signed by Fredy Dalal PTA on 5/12/2021 at 12:17 PM

## 2021-05-13 LAB
ALBUMIN SERPL-MCNC: 3.3 G/DL (ref 3.5–5.2)
ALP BLD-CCNC: 56 U/L (ref 35–104)
ALT SERPL-CCNC: 27 U/L (ref 5–33)
ANION GAP SERPL CALCULATED.3IONS-SCNC: 6 MMOL/L (ref 7–19)
AST SERPL-CCNC: 36 U/L (ref 5–32)
ATYPICAL LYMPHOCYTE RELATIVE PERCENT: 7 % (ref 0–8)
BASOPHILS ABSOLUTE: 0 K/UL (ref 0–0.2)
BASOPHILS RELATIVE PERCENT: 0 % (ref 0–1)
BILIRUB SERPL-MCNC: 0.4 MG/DL (ref 0.2–1.2)
BUN BLDV-MCNC: 12 MG/DL (ref 8–23)
CALCIUM SERPL-MCNC: 8.6 MG/DL (ref 8.8–10.2)
CHLORIDE BLD-SCNC: 100 MMOL/L (ref 98–111)
CO2: 31 MMOL/L (ref 22–29)
CREAT SERPL-MCNC: 0.5 MG/DL (ref 0.5–0.9)
EOSINOPHILS ABSOLUTE: 0 K/UL (ref 0–0.6)
EOSINOPHILS RELATIVE PERCENT: 0 % (ref 0–5)
GFR AFRICAN AMERICAN: >59
GFR NON-AFRICAN AMERICAN: >60
GLUCOSE BLD-MCNC: 117 MG/DL (ref 70–99)
GLUCOSE BLD-MCNC: 123 MG/DL (ref 74–109)
GLUCOSE BLD-MCNC: 142 MG/DL (ref 70–99)
GLUCOSE BLD-MCNC: 177 MG/DL (ref 70–99)
GLUCOSE BLD-MCNC: 93 MG/DL (ref 70–99)
HCT VFR BLD CALC: 38.7 % (ref 37–47)
HEMOGLOBIN: 12.6 G/DL (ref 12–16)
IMMATURE GRANULOCYTES #: 0.2 K/UL
LYMPHOCYTES ABSOLUTE: 1.8 K/UL (ref 1.1–4.5)
LYMPHOCYTES RELATIVE PERCENT: 14 % (ref 20–40)
MCH RBC QN AUTO: 29.6 PG (ref 27–31)
MCHC RBC AUTO-ENTMCNC: 32.6 G/DL (ref 33–37)
MCV RBC AUTO: 90.8 FL (ref 81–99)
MONOCYTES ABSOLUTE: 0.5 K/UL (ref 0–0.9)
MONOCYTES RELATIVE PERCENT: 6 % (ref 0–10)
MONONUCLEAR UNIDENTIFIED CELLS: 1 %
NEUTROPHILS ABSOLUTE: 6 K/UL (ref 1.5–7.5)
NEUTROPHILS RELATIVE PERCENT: 72 % (ref 50–65)
OVALOCYTES: ABNORMAL
PDW BLD-RTO: 12.1 % (ref 11.5–14.5)
PERFORMED ON: ABNORMAL
PERFORMED ON: NORMAL
PLATELET # BLD: 476 K/UL (ref 130–400)
PLATELET SLIDE REVIEW: ABNORMAL
PMV BLD AUTO: 9.6 FL (ref 9.4–12.3)
POTASSIUM REFLEX MAGNESIUM: 3.9 MMOL/L (ref 3.5–5)
RBC # BLD: 4.26 M/UL (ref 4.2–5.4)
SODIUM BLD-SCNC: 137 MMOL/L (ref 136–145)
TOTAL PROTEIN: 6.2 G/DL (ref 6.6–8.7)
WBC # BLD: 8.4 K/UL (ref 4.8–10.8)

## 2021-05-13 PROCEDURE — 80053 COMPREHEN METABOLIC PANEL: CPT

## 2021-05-13 PROCEDURE — 85025 COMPLETE CBC W/AUTO DIFF WBC: CPT

## 2021-05-13 PROCEDURE — 82947 ASSAY GLUCOSE BLOOD QUANT: CPT

## 2021-05-13 PROCEDURE — 6370000000 HC RX 637 (ALT 250 FOR IP): Performed by: HOSPITALIST

## 2021-05-13 PROCEDURE — 6370000000 HC RX 637 (ALT 250 FOR IP): Performed by: FAMILY MEDICINE

## 2021-05-13 PROCEDURE — 2580000003 HC RX 258: Performed by: FAMILY MEDICINE

## 2021-05-13 PROCEDURE — 97116 GAIT TRAINING THERAPY: CPT

## 2021-05-13 PROCEDURE — 97535 SELF CARE MNGMENT TRAINING: CPT

## 2021-05-13 PROCEDURE — 97530 THERAPEUTIC ACTIVITIES: CPT

## 2021-05-13 PROCEDURE — 2700000000 HC OXYGEN THERAPY PER DAY

## 2021-05-13 PROCEDURE — 6360000002 HC RX W HCPCS: Performed by: FAMILY MEDICINE

## 2021-05-13 PROCEDURE — 2100000000 HC CCU R&B

## 2021-05-13 PROCEDURE — 97110 THERAPEUTIC EXERCISES: CPT

## 2021-05-13 RX ORDER — ASCORBIC ACID 500 MG
500 TABLET ORAL DAILY
Status: DISCONTINUED | OUTPATIENT
Start: 2021-05-13 | End: 2021-05-15 | Stop reason: HOSPADM

## 2021-05-13 RX ORDER — ALBUTEROL SULFATE 90 UG/1
2 AEROSOL, METERED RESPIRATORY (INHALATION) 4 TIMES DAILY
Status: DISCONTINUED | OUTPATIENT
Start: 2021-05-13 | End: 2021-05-15 | Stop reason: HOSPADM

## 2021-05-13 RX ADMIN — ALBUTEROL SULFATE 2 PUFF: 90 AEROSOL, METERED RESPIRATORY (INHALATION) at 21:00

## 2021-05-13 RX ADMIN — GUAIFENESIN 1200 MG: 600 TABLET, EXTENDED RELEASE ORAL at 08:47

## 2021-05-13 RX ADMIN — FAMOTIDINE 20 MG: 20 TABLET, FILM COATED ORAL at 08:47

## 2021-05-13 RX ADMIN — IPRATROPIUM BROMIDE 2 PUFF: 17 AEROSOL, METERED RESPIRATORY (INHALATION) at 21:00

## 2021-05-13 RX ADMIN — ASPIRIN 81 MG: 81 TABLET, COATED ORAL at 08:47

## 2021-05-13 RX ADMIN — GUAIFENESIN 1200 MG: 600 TABLET, EXTENDED RELEASE ORAL at 21:01

## 2021-05-13 RX ADMIN — INSULIN LISPRO 2 UNITS: 100 INJECTION, SOLUTION INTRAVENOUS; SUBCUTANEOUS at 17:38

## 2021-05-13 RX ADMIN — ENOXAPARIN SODIUM 80 MG: 80 INJECTION SUBCUTANEOUS at 08:47

## 2021-05-13 RX ADMIN — METOPROLOL SUCCINATE 25 MG: 50 TABLET, EXTENDED RELEASE ORAL at 08:48

## 2021-05-13 RX ADMIN — Medication 2000 UNITS: at 08:47

## 2021-05-13 RX ADMIN — SODIUM CHLORIDE, PRESERVATIVE FREE 10 ML: 5 INJECTION INTRAVENOUS at 08:48

## 2021-05-13 RX ADMIN — ALBUTEROL SULFATE 2 PUFF: 90 AEROSOL, METERED RESPIRATORY (INHALATION) at 16:00

## 2021-05-13 RX ADMIN — IPRATROPIUM BROMIDE 2 PUFF: 17 AEROSOL, METERED RESPIRATORY (INHALATION) at 12:00

## 2021-05-13 RX ADMIN — CETIRIZINE HYDROCHLORIDE 10 MG: 10 TABLET, FILM COATED ORAL at 08:47

## 2021-05-13 RX ADMIN — NIACIN 500 MG: 500 TABLET, EXTENDED RELEASE ORAL at 21:01

## 2021-05-13 RX ADMIN — IPRATROPIUM BROMIDE 2 PUFF: 17 AEROSOL, METERED RESPIRATORY (INHALATION) at 16:00

## 2021-05-13 RX ADMIN — FAMOTIDINE 20 MG: 20 TABLET, FILM COATED ORAL at 21:01

## 2021-05-13 RX ADMIN — ALBUTEROL SULFATE 2 PUFF: 90 AEROSOL, METERED RESPIRATORY (INHALATION) at 12:00

## 2021-05-13 RX ADMIN — ENOXAPARIN SODIUM 80 MG: 80 INJECTION SUBCUTANEOUS at 21:00

## 2021-05-13 RX ADMIN — DEXAMETHASONE 6 MG: 2 TABLET ORAL at 08:48

## 2021-05-13 RX ADMIN — Medication 50 MG: at 08:47

## 2021-05-13 RX ADMIN — OXYCODONE HYDROCHLORIDE AND ACETAMINOPHEN 500 MG: 500 TABLET ORAL at 08:47

## 2021-05-13 RX ADMIN — Medication 5 MG: at 21:01

## 2021-05-13 ASSESSMENT — PAIN SCALES - GENERAL
PAINLEVEL_OUTOF10: 0

## 2021-05-13 NOTE — PROGRESS NOTES
Physical Therapy  Indiranaida Sandy  [id number]     05/13/21 0950   Subjective   Subjective Agrees to work with therapy. Bed Mobility   Supine to Sit Stand by assistance   Transfers   Sit to Stand Stand by assistance   Stand to sit Stand by assistance   Ambulation   Ambulation? Yes   Ambulation 1   Surface level tile   Device No Device   Assistance Stand by assistance   Distance 5'   Exercises   Comments AROM B LE ex's in sitting   Other Activities   Comment Assisted patient with grooming and transferring bed to chair. Patient positioned for comfort with all needs in reach. Short term goals   Time Frame for Short term goals 2 wks   Short term goal 1 supine to sit indep   Short term goal 2 sit to stand indep   Short term goal 3 amb. 100' with or without RW SBA   Activity Tolerance   Activity Tolerance Patient Tolerated treatment well   Safety Devices   Type of devices Call light within reach; Left in chair   Electronically signed by Chloe Zazueta PTA on 5/13/2021 at 9:52 AM

## 2021-05-13 NOTE — CONSULTS
**Physician Signature**  This document was electronically signed by: Ros Sunshine MD  2021   08:42 AM    **Consult Information**  Member Facility: [address] [place] [address] MRN: [id number]  Visit/Encounter Number: [id number]  Consult ID: [id number]  Facility Time Zone: CT  Date and Time of Request: 2021 06:17 AM  Requesting Clinician: DR. Med Maddox  Patient Name: Mamadou Jaimes  YOB: 1950  Gender: Female  Patient identity was confirmed at the beginning of the consult with the   patient/family/staff using two personal identifiers: Patient name and       **Admission**  Admission Date: 2021  Chief reason for ICU admission: COVID - 19  Secondary reasons for ICU admission: Respiratory Failure, Pneumonia    **Core Metrics**  General orienting sentence for patient: 1110 AM: 66year old woman dx'ed with   COVID-19 along with spouse who is now worse than she is. Currently on 9L via   high flow. Remdesivir, decadron. Nasal cannula. desaturation and long time to   recover. Trying to self prone sleep last night. 6 liters nasal cannula   overnight. Platelets creeping   up. Chief physiologic deterioration: Increase in FiO2 required by   30%  Is the patient on DVT prophylaxis?: Yes  Prophylaxis type: Pharmacological  DVT Prophylaxis Comments: Lovenox increased by hospitalist to   80  Is the patient on GI prophylaxis?: Yes  Gi Prophylaxis Comments: pepcid  Has this patient reached their nutritional goal?: Yes  Are there current issues with pain management in this patient?:   No  Are there issues with skin integrity?: No  Are there issues with delirium?: No  Has the patient been mobilized?: Yes  Mobilized Details: P. T. working with patient  Is this patient currently intubated?: No  Are there ethical or care philosophy or family issues?: No  Family Issues Details: Spouse is also in ICU  Do you recommend an in depth evaluation?: No  Do you recommend the patient should: : Continue ICU level of care

## 2021-05-13 NOTE — PROGRESS NOTES
Occupational Therapy  Facility/Department: [facility] CORONARY CARE UNIT  Daily Treatment Note  NAME: Kwaku Walden  : 1950  MRN: [id number]    Date of Service: 2021    Discharge Recommendations:  Continue to assess pending progress       Assessment   Performance deficits / Impairments: Decreased functional mobility ; Decreased ADL status; Decreased strength;Decreased endurance;Decreased balance  Assessment: Pt tolerated tx well. Pt demonstrated stand step transfer from bed to reclining chair with SBA to CGA. Pt would continue to benefit from skilled OT services. Treatment Diagnosis: Pneumonia due to Covid 19  Prognosis: Good  REQUIRES OT FOLLOW UP: Yes  Activity Tolerance  Activity Tolerance: Patient Tolerated treatment well  Safety Devices  Safety Devices in place: Yes  Type of devices: Call light within reach; Left in chair         Patient Diagnosis(es): The primary encounter diagnosis was Acute respiratory failure with hypoxia ([facility] [address] A diagnosis of COVID-19 was also pertinent to this visit. has a past medical history of Diabetes mellitus ([facility] [address] Hypertension, and Palliative care patient. has a past surgical history that includes Lithotripsy; Hysterectomy; and Cataract removal.    Restrictions  Restrictions/Precautions  Restrictions/Precautions: Fall Risk, Isolation, Contact Precautions  Required Braces or Orthoses?: No  Position Activity Restriction  Other position/activity restrictions: droplet prec due to covid 19  Subjective   General  Chart Reviewed: Yes  Patient assessed for rehabilitation services?: Yes  Response to previous treatment: Patient with no complaints from previous session  Family / Caregiver Present: No  Subjective  Subjective: Pt in bed when therapy arrived. Pt agreeable to sit up in reclining chair. Pain Assessment  Non-Pharmaceutical Pain Intervention(s): Ambulation/Increased Activity; Distraction;Repositioned; Rest  Response to Pain Intervention: Patient Satisfied  Vital Signs  Patient Currently in Pain: No   Orientation     Objective    ADL  LE Dressing: Stand by assistance;Supervision(Pt donned/doffed grippy socks)        Balance  Sitting Balance: Independent  Standing Balance: Stand by assistance  Bed mobility  Rolling to Right: Stand by assistance  Supine to Sit: Stand by assistance  Transfers  Stand Step Transfers: Contact guard assistance;Stand by assistance  Sit to stand: Stand by assistance  Stand to sit: Stand by assistance                                                           Plan   Plan  Times per week: 3-5  Times per day: Daily  Plan Comment: Plans to return home with . Goals  Short term goals  Short term goal 1: Independent with dressing, toileting. Short term goal 2: Independent with therapeutic exercise program and positioning strategies. Short term goal 3:  Independent with transfers, standing and light ambulatory ADL       Therapy Time   Individual Concurrent Group Co-treatment   Time In           Time Out           Minutes              Estrellita Multani    Electronically signed by ANNELISE Jones on 4/23/2021 at 8:16 AM

## 2021-05-13 NOTE — PROGRESS NOTES
Hospitalist Progress Note  5/13/2021 12:15 PM  Subjective:   Admit Date: 5/9/2021  PCP: Demi Hua MD      First interaction with patient 5/13/2021, remains in Covid CCU room 706      Chief Complaint: fatigue, shortness of breath    Subjective: Patient seen and examined sitting up in bedside chair, conversational.  Deny Carlsonic as though she is improving. Has worked with therapy services this a.m. Case reviewed with nursing staff. Patient hopeful for discharge home soon. Denies any headache, change in vision, chest pain, nausea vomiting, fevers or chills. Cumulative Hospital History:   5-9: Presents to ED with her  with complaint of worsening fatigue, weakness, shortness of breath x1 week, recently tested positive for COVID-19. Some nausea, vomiting, diarrhea. Serum sodium quite low at 125, lactic acid elevated, hypoxic in ED. Admitted to CCU on dexamethasone, supportive treatment, supplemental oxygen. 5-10: Increasing oxygen requirements overnight. CRP elevated, tocilizumab given. Scheduled guaifenesin. Add zinc for supportive care. 5-11: Down to 5 LPM high-flow for most of the last 24 hours, though she desaturates with light activity. Feels a little better. 5-12: Bradycardia, asymptomatic but will decrease metoprolol dose by 50%. Continues to feel better since admission. Required increased flow rates overnight but back down to 6 LPM via nasal cannula this morning. 5-13: Patient feels as though she is clinically improving, heart rate has improved, remains on 6 L nasal cannula. Dose adjustments and Lovenox to therapeutic twice daily, addition of inhaler regimen, continues on day 5 Decadron. ROS: 14 point review of systems is negative except as specifically addressed above.       DIET LOW SODIUM 2 GM; Dry Tray; Safety Tray; Safety Tray (Disposables)    Intake/Output Summary (Last 24 hours) at 5/13/2021 1215  Last data filed at 5/13/2021 1008  Gross per 24 hour   Intake 810 ml   Output 1700 ml   Net -890 ml     Medications:   sodium chloride      dextrose       Current Facility-Administered Medications   Medication Dose Route Frequency Provider Last Rate Last Admin    ascorbic acid (VITAMIN C) tablet 500 mg  500 mg Oral Daily Vidhya Tapia MD   500 mg at 05/13/21 0847    enoxaparin (LOVENOX) injection 80 mg  1 mg/kg Subcutaneous BID Vidhya Tapia MD   80 mg at 05/13/21 0847    albuterol sulfate  (90 Base) MCG/ACT inhaler 2 puff  2 puff Inhalation 4x daily Vidhya Tapia MD        And    ipratropium (ATROVENT HFA) 17 MCG/ACT inhaler 2 puff  2 puff Inhalation 4x daily Vidhya Tapia MD        metoprolol succinate (TOPROL XL) extended release tablet 25 mg  25 mg Oral Daily Israel Vee, DO   25 mg at 05/13/21 0848    melatonin disintegrating tablet 5 mg  5 mg Oral Nightly Ema Durham MD   5 mg at 05/12/21 2124    guaiFENesin (MUCINEX) extended release tablet 1,200 mg  1,200 mg Oral BID Arron Bump, DO   1,200 mg at 05/13/21 0847    zinc sulfate (ZINCATE) capsule 50 mg  50 mg Oral Daily Israel Vee, DO   50 mg at 05/13/21 0847    sodium chloride flush 0.9 % injection 5-40 mL  5-40 mL Intravenous 2 times per day Arron Bump, DO   10 mL at 05/13/21 0848    sodium chloride flush 0.9 % injection 5-40 mL  5-40 mL Intravenous PRN Israel Vee, DO        0.9 % sodium chloride infusion  25 mL Intravenous PRN Arron Bump, DO        promethazine (PHENERGAN) tablet 12.5 mg  12.5 mg Oral Q6H PRN Arron Bump, DO        Or    ondansetron [facility]) injection 4 mg  4 mg Intravenous Q6H PRN Arron Bump, DO        polyethylene glycol (GLYCOLAX) packet 17 g  17 g Oral Daily PRN Israel Vee, DO   17 g at 05/12/21 1116    acetaminophen (TYLENOL) tablet 650 mg  650 mg Oral Q6H PRN Israel Vee, DO   650 mg at 05/12/21 2125    Or    acetaminophen (TYLENOL) suppository 650 mg  650 mg Rectal Q6H PRN Arron Bump, DO        guaiFENesin-dextromethorphan (ROBITUSSIN DM) 100-10 MG/5ML syrup 5 mL  5 mL Oral Q4H PRN Brian Botello Vee, DO   5 mL at 05/10/21 0704    dexamethasone (DECADRON) tablet 6 mg  6 mg Oral Daily Israel Vee, DO   6 mg at 05/13/21 0848    Vitamin D (CHOLECALCIFEROL) tablet 2,000 Units  2,000 Units Oral Daily Fred Homans, DO   2,000 Units at 05/13/21 0847    aspirin EC tablet 81 mg  81 mg Oral Daily Israel Vee, DO   81 mg at 05/13/21 0847    famotidine (PEPCID) tablet 20 mg  20 mg Oral BID [place] Vee, DO   20 mg at 05/13/21 0847    cetirizine (ZYRTEC) tablet 10 mg  10 mg Oral Daily Israel Vee, DO   10 mg at 05/13/21 0847    niacin (NIASPAN) extended release tablet 500 mg  500 mg Oral Nightly Israel Vee, DO   500 mg at 05/12/21 2124    insulin lispro (HUMALOG) injection vial 0-12 Units  0-12 Units Subcutaneous TID WC [place] Vee, DO   2 Units at 05/11/21 1717    insulin lispro (HUMALOG) injection vial 0-6 Units  0-6 Units Subcutaneous Nightly [place] Vee, DO   1 Units at 05/12/21 2126    glucose (GLUTOSE) 40 % oral gel 15 g  15 g Oral PRN [facility], DO        dextrose 50 % IV solution  12.5 g Intravenous PRN Fred Homans, DO        glucagon (rDNA) injection 1 mg  1 mg Intramuscular PRN Fred Homans, DO        dextrose 5 % solution  100 mL/hr Intravenous PRN Fred Homans, DO        potassium chloride (KLOR-CON M) extended release tablet 40 mEq  40 mEq Oral PRN Fred Homans, DO        Or    potassium bicarb-citric acid (EFFER-K) effervescent tablet 40 mEq  40 mEq Oral PRN Fred Homans, DO        Or    potassium chloride 10 mEq/100 mL IVPB (Peripheral Line)  10 mEq Intravenous PRN Fred Homans, DO            Labs:     Recent Labs     05/11/21  0445 05/12/21  0442 05/13/21  0446   WBC 7.8 8.7 8.4   RBC 3.83* 4.12* 4.26   HGB 11.5* 12.5 12.6   HCT 35.0* 37.3 38.7   MCV 91.4 90.5 90.8   MCH 30.0 30.3 29.6   MCHC 32.9* 33.5 32.6*    454* 476*     Recent Labs     05/11/21  0445 05/12/21 0442 05/13/21 0446    140 137   K 3.8 4.1 3.9   ANIONGAP 7 9 6*    102 100   CO2 29 29 31*   BUN 9 12 12   CREATININE 0.4* 0.5 0.5 Hyponatremia    Hypocalcemia    Hypokalemia    Acute respiratory failure with hypoxia (HCC)    Palliative care patient    Bradycardia, drug induced  Resolved Problems:    * No resolved hospital problems. *      Covid 19  -Continues on dexamethasone day #5/10  -Patient received tocilizumab 5/10/2021  -Addition of inhaler regimen, incentive spirometry  -Continue to wean oxygen, consideration of DC home once stable on 4 L or less  -Continue with Lovenox anticoagulation dose increased to therapeutic  -Left Tis downtrending  -Patient will require home health services upon DC, and Eliquis      Bradycardia sinus-continue with Toprol dosing, telemetry monitoring    Hyponatremia-currently resolved    Elevated lactic acid level -continue to encourage p.o. intake, fluids        Advanced directive-full code  DVT prophylaxis-Lovenox  Discharge planning-will anticipate home health once oxygen level stable 4 L were left, home O2 evaluation to be completed        EMR Dragon/Transcription disclaimer:   Much of this encounter note is an electronic transcription/translation of spoken language to printed text.  The electronic translation of spoken language may permit erroneous, or at times, nonsensical words or phrases to be inadvertently transcribed; although attempts have made to review the note for such errors, some may still exist.    Electronically signed by   Supa Raines   Internal Medicine Hospitalist  On 5/13/2021  At 12:15 PM

## 2021-05-14 LAB
ALBUMIN SERPL-MCNC: 3.3 G/DL (ref 3.5–5.2)
ALP BLD-CCNC: 55 U/L (ref 35–104)
ALT SERPL-CCNC: 32 U/L (ref 5–33)
ANION GAP SERPL CALCULATED.3IONS-SCNC: 9 MMOL/L (ref 7–19)
AST SERPL-CCNC: 35 U/L (ref 5–32)
BASOPHILS ABSOLUTE: 0 K/UL (ref 0–0.2)
BASOPHILS RELATIVE PERCENT: 0.3 % (ref 0–1)
BILIRUB SERPL-MCNC: 0.5 MG/DL (ref 0.2–1.2)
BUN BLDV-MCNC: 12 MG/DL (ref 8–23)
C-REACTIVE PROTEIN: 0.74 MG/DL (ref 0–0.5)
CALCIUM SERPL-MCNC: 8.7 MG/DL (ref 8.8–10.2)
CHLORIDE BLD-SCNC: 100 MMOL/L (ref 98–111)
CO2: 28 MMOL/L (ref 22–29)
CREAT SERPL-MCNC: 0.4 MG/DL (ref 0.5–0.9)
D DIMER: 1.49 UG/ML FEU (ref 0–0.48)
EOSINOPHILS ABSOLUTE: 0 K/UL (ref 0–0.6)
EOSINOPHILS RELATIVE PERCENT: 0 % (ref 0–5)
GFR AFRICAN AMERICAN: >59
GFR NON-AFRICAN AMERICAN: >60
GLUCOSE BLD-MCNC: 111 MG/DL (ref 74–109)
GLUCOSE BLD-MCNC: 112 MG/DL (ref 70–99)
GLUCOSE BLD-MCNC: 148 MG/DL (ref 70–99)
GLUCOSE BLD-MCNC: 157 MG/DL (ref 70–99)
GLUCOSE BLD-MCNC: 99 MG/DL (ref 70–99)
HCT VFR BLD CALC: 40.3 % (ref 37–47)
HEMOGLOBIN: 13.4 G/DL (ref 12–16)
IMMATURE GRANULOCYTES #: 0.2 K/UL
LYMPHOCYTES ABSOLUTE: 2.2 K/UL (ref 1.1–4.5)
LYMPHOCYTES RELATIVE PERCENT: 19.9 % (ref 20–40)
MCH RBC QN AUTO: 29.8 PG (ref 27–31)
MCHC RBC AUTO-ENTMCNC: 33.3 G/DL (ref 33–37)
MCV RBC AUTO: 89.8 FL (ref 81–99)
MONOCYTES ABSOLUTE: 1.1 K/UL (ref 0–0.9)
MONOCYTES RELATIVE PERCENT: 10 % (ref 0–10)
NEUTROPHILS ABSOLUTE: 7.6 K/UL (ref 1.5–7.5)
NEUTROPHILS RELATIVE PERCENT: 67.7 % (ref 50–65)
PDW BLD-RTO: 12 % (ref 11.5–14.5)
PERFORMED ON: ABNORMAL
PERFORMED ON: NORMAL
PLATELET # BLD: 530 K/UL (ref 130–400)
PMV BLD AUTO: 9.6 FL (ref 9.4–12.3)
POTASSIUM REFLEX MAGNESIUM: 4 MMOL/L (ref 3.5–5)
RBC # BLD: 4.49 M/UL (ref 4.2–5.4)
SODIUM BLD-SCNC: 137 MMOL/L (ref 136–145)
TOTAL PROTEIN: 6.2 G/DL (ref 6.6–8.7)
WBC # BLD: 11.2 K/UL (ref 4.8–10.8)

## 2021-05-14 PROCEDURE — 6370000000 HC RX 637 (ALT 250 FOR IP): Performed by: HOSPITALIST

## 2021-05-14 PROCEDURE — 82947 ASSAY GLUCOSE BLOOD QUANT: CPT

## 2021-05-14 PROCEDURE — 2700000000 HC OXYGEN THERAPY PER DAY

## 2021-05-14 PROCEDURE — 85025 COMPLETE CBC W/AUTO DIFF WBC: CPT

## 2021-05-14 PROCEDURE — 6370000000 HC RX 637 (ALT 250 FOR IP): Performed by: FAMILY MEDICINE

## 2021-05-14 PROCEDURE — 80053 COMPREHEN METABOLIC PANEL: CPT

## 2021-05-14 PROCEDURE — 2580000003 HC RX 258: Performed by: FAMILY MEDICINE

## 2021-05-14 PROCEDURE — 6360000002 HC RX W HCPCS: Performed by: FAMILY MEDICINE

## 2021-05-14 PROCEDURE — 86140 C-REACTIVE PROTEIN: CPT

## 2021-05-14 PROCEDURE — 2100000000 HC CCU R&B

## 2021-05-14 PROCEDURE — 85379 FIBRIN DEGRADATION QUANT: CPT

## 2021-05-14 RX ORDER — LORAZEPAM 0.5 MG/1
0.5 TABLET ORAL EVERY 4 HOURS PRN
Status: DISCONTINUED | OUTPATIENT
Start: 2021-05-14 | End: 2021-05-15 | Stop reason: HOSPADM

## 2021-05-14 RX ADMIN — SODIUM CHLORIDE, PRESERVATIVE FREE 10 ML: 5 INJECTION INTRAVENOUS at 21:12

## 2021-05-14 RX ADMIN — ENOXAPARIN SODIUM 80 MG: 80 INJECTION SUBCUTANEOUS at 09:46

## 2021-05-14 RX ADMIN — ALBUTEROL SULFATE 2 PUFF: 90 AEROSOL, METERED RESPIRATORY (INHALATION) at 11:48

## 2021-05-14 RX ADMIN — IPRATROPIUM BROMIDE 2 PUFF: 17 AEROSOL, METERED RESPIRATORY (INHALATION) at 11:48

## 2021-05-14 RX ADMIN — IPRATROPIUM BROMIDE 2 PUFF: 17 AEROSOL, METERED RESPIRATORY (INHALATION) at 21:12

## 2021-05-14 RX ADMIN — OXYCODONE HYDROCHLORIDE AND ACETAMINOPHEN 500 MG: 500 TABLET ORAL at 09:45

## 2021-05-14 RX ADMIN — FAMOTIDINE 20 MG: 20 TABLET, FILM COATED ORAL at 09:46

## 2021-05-14 RX ADMIN — FAMOTIDINE 20 MG: 20 TABLET, FILM COATED ORAL at 21:11

## 2021-05-14 RX ADMIN — IPRATROPIUM BROMIDE 2 PUFF: 17 AEROSOL, METERED RESPIRATORY (INHALATION) at 08:32

## 2021-05-14 RX ADMIN — ALBUTEROL SULFATE 2 PUFF: 90 AEROSOL, METERED RESPIRATORY (INHALATION) at 08:32

## 2021-05-14 RX ADMIN — NIACIN 500 MG: 500 TABLET, EXTENDED RELEASE ORAL at 21:11

## 2021-05-14 RX ADMIN — Medication 2000 UNITS: at 09:45

## 2021-05-14 RX ADMIN — GUAIFENESIN 1200 MG: 600 TABLET, EXTENDED RELEASE ORAL at 21:11

## 2021-05-14 RX ADMIN — LORAZEPAM 0.5 MG: 0.5 TABLET ORAL at 10:51

## 2021-05-14 RX ADMIN — Medication 5 MG: at 21:11

## 2021-05-14 RX ADMIN — METOPROLOL SUCCINATE 25 MG: 50 TABLET, EXTENDED RELEASE ORAL at 09:46

## 2021-05-14 RX ADMIN — DEXAMETHASONE 6 MG: 2 TABLET ORAL at 09:45

## 2021-05-14 RX ADMIN — GUAIFENESIN 1200 MG: 600 TABLET, EXTENDED RELEASE ORAL at 09:45

## 2021-05-14 RX ADMIN — CETIRIZINE HYDROCHLORIDE 10 MG: 10 TABLET, FILM COATED ORAL at 09:45

## 2021-05-14 RX ADMIN — ALBUTEROL SULFATE 2 PUFF: 90 AEROSOL, METERED RESPIRATORY (INHALATION) at 21:12

## 2021-05-14 RX ADMIN — ALBUTEROL SULFATE 2 PUFF: 90 AEROSOL, METERED RESPIRATORY (INHALATION) at 17:02

## 2021-05-14 RX ADMIN — SODIUM CHLORIDE, PRESERVATIVE FREE 10 ML: 5 INJECTION INTRAVENOUS at 09:46

## 2021-05-14 RX ADMIN — Medication 50 MG: at 09:45

## 2021-05-14 RX ADMIN — ENOXAPARIN SODIUM 80 MG: 80 INJECTION SUBCUTANEOUS at 21:11

## 2021-05-14 RX ADMIN — IPRATROPIUM BROMIDE 2 PUFF: 17 AEROSOL, METERED RESPIRATORY (INHALATION) at 17:02

## 2021-05-14 RX ADMIN — ASPIRIN 81 MG: 81 TABLET, COATED ORAL at 09:45

## 2021-05-14 RX ADMIN — LORAZEPAM 0.5 MG: 0.5 TABLET ORAL at 16:29

## 2021-05-14 ASSESSMENT — PAIN SCALES - GENERAL
PAINLEVEL_OUTOF10: 0

## 2021-05-14 NOTE — CARE COORDINATION
Informed pt is expected to dc home tomorrow with spouse who is currently admitted to covid unit at [facility] as well. SW requested [facility] referral for dc, home O2 eval and home O2 orders. Pt and spouse have requested to use [facility] for home O2 need and SW will arrange following qualifiers. [facility]  PH: [phone]  FA: [phone]  Pt will be provided with pulse ox meter prior to dc as well. Pt has Eliquis coupon for 30 day supply free in soft chart to be provided for use upon dc. Pt has [facility] referral entered and spouse has established [facility] for services.

## 2021-05-14 NOTE — PLAN OF CARE
Nutrition Problem #1: Inadequate protein-energy intake  Intervention: Food and/or Nutrient Delivery: Modify Current Diet  Nutritional Goals: PO intake 50% or greater.   Accuchek's 150 or less

## 2021-05-14 NOTE — PROGRESS NOTES
Comprehensive Nutrition Assessment    Type and Reason for Visit:  Initial, RD Nutrition Re-Screen/LOS    Nutrition Recommendations/Plan: continue current POC    Nutrition Assessment:  Pt appears adequately nourished AEB visual fat and muscle mass. Pt assessed through glass door as to save PPE. PO intake is improving as \"its a little easier to breath. \"  Modified current diet with addition of Carb Control d/t elevated accuchek's.,decadron and hx of DM    Malnutrition Assessment:  Malnutrition Status: At risk for malnutrition (Comment)    Context:  Acute Illness     Findings of the 6 clinical characteristics of malnutrition:  Energy Intake:  7 - 50% or less of estimated energy requirements for 5 or more days  Weight Loss:  Unable to assess     Body Fat Loss:  No significant body fat loss     Muscle Mass Loss:  No significant muscle mass loss    Fluid Accumulation:  1 - Mild Extremities   Strength:  Not Performed    Estimated Daily Nutrient Needs:  Energy (kcal):  647-7164 kcals (11-14 kcals/kg); Weight Used for Energy Requirements:  Current     Protein (g):  100g; Weight Used for Protein Requirements:  Ideal        Fluid (ml/day):  887-1129 ml; Method Used for Fluid Requirements:  1 ml/kcal      Nutrition Related Findings:  adequately nourished      Wounds:  None       Current Nutrition Therapies:    DIET CARDIAC; Carb Control: 4 carb choices (60 gms)/meal; Safety Tray; Safety Tray (Disposables)    Anthropometric Measures:  · Height: 5' 2\" (157.5 cm)  · Current Body Weight: (NA)   · Admission Body Weight: 178 lb (80.7 kg)    · Usual Body Weight:       · Ideal Body Weight: 110 lbs; % Ideal Body Weight     · BMI:    · Adjusted Body Weight:  ; No Adjustment   · BMI Categories: Obese Class 1 (BMI 30.0-34. 9)       Nutrition Diagnosis:   · Inadequate protein-energy intake related to impaired respiratory function as evidenced by intake 0-25%, intake 26-50%      Nutrition Interventions:   Food and/or Nutrient Delivery:  Modify Current Diet  Nutrition Education/Counseling:  No recommendation at this time   Coordination of Nutrition Care:  Continue to monitor while inpatient    Goals:  PO intake 50% or greater.   Accuchek's 150 or less       Nutrition Monitoring and Evaluation:   Behavioral-Environmental Outcomes:  None Identified   Food/Nutrient Intake Outcomes:  Food and Nutrient Intake  Physical Signs/Symptoms Outcomes:  Biochemical Data, Nutrition Focused Physical Findings, Skin, Weight     Discharge Planning:    Continue current diet     Electronically signed by Keshav Campbell MS, RD, LD on 5/14/21 at 1:55 PM CDT    Contact: [phone]

## 2021-05-14 NOTE — PROGRESS NOTES
Home O2 Eval at bedside:     Sitting up in recliner on room air, O2 sat 87-88%  Ambulating around on room air, patient O2 sat 84-88%    Sitting around room on salter high flow 4L, O2 sat 92-96%  Ambulating around the room, on salter high flow 4L, O2 sat 91-93%

## 2021-05-14 NOTE — PROGRESS NOTES
Visited with pt to provide spiritual care. Pt was sitting up in a chair and says she may get to discharge tomorrow. Her  may be discharging tomorrow also. Provided spiritual care with sustaining presence, nurtured hope, and prayer. Pt expressed gratitude for spiritual care.     Electronically signed by Renetta Devi on 5/14/2021 at 2:20 PM

## 2021-05-14 NOTE — CARE COORDINATION
Spoke with son Catarina Murray via telephone and discussed pt care needs and what is to be established prior to pt's dc, including HH and home O2 needs. Son was concerned about quarantine period for both parents and SW instructed will be on direction from the [address] [place] [address] Road.

## 2021-05-14 NOTE — PROGRESS NOTES
Hospitalist Progress Note  5/14/2021 10:07 AM  Subjective:   Admit Date: 5/9/2021  PCP: Ele Logan MD        Chief Complaint: fatigue, shortness of breath    Subjective: Patient seen this a.m., sitting up on the bedside consuming breakfast, case reviewed with nursing staff patient did not sleep well overnight. Admittedly anxious. Patient able to be weaned down to 5/4 L of oxygen however continued desaturations with minimal movement. Denies any headache, change in vision, chest pain, nausea vomiting, fevers or chills. Cumulative Hospital History:   5-9: Presents to ED with her  with complaint of worsening fatigue, weakness, shortness of breath x1 week, recently tested positive for COVID-19. Some nausea, vomiting, diarrhea. Serum sodium quite low at 125, lactic acid elevated, hypoxic in ED. Admitted to CCU on dexamethasone, supportive treatment, supplemental oxygen. 5-10: Increasing oxygen requirements overnight. CRP elevated, tocilizumab given. Scheduled guaifenesin. Add zinc for supportive care. 5-11: Down to 5 LPM high-flow for most of the last 24 hours, though she desaturates with light activity. Feels a little better. 5-12: Bradycardia, asymptomatic but will decrease metoprolol dose by 50%. Continues to feel better since admission. Required increased flow rates overnight but back down to 6 LPM via nasal cannula this morning. 5-13: Patient feels as though she is clinically improving, heart rate has improved, remains on 6 L nasal cannula. Dose adjustments and Lovenox to therapeutic twice daily, addition of inhaler regimen, continues on day 5 Decadron. 5-14: Ongoing measures of oxygen wean, will assess home O2 evaluation today, addition of low-dose p.o. Ativan due to underlying anxiety lack of sleep. Note of elevated D-dimer.   Hopeful discharge home with home health services once stable on oxygen at about 4 L.      ROS: 14 point review of systems is negative except as specifically addressed above.       DIET CARDIAC; Carb Control: 4 carb choices (60 gms)/meal; Safety Tray; Safety Tray (Disposables)    Intake/Output Summary (Last 24 hours) at 5/14/2021 1007  Last data filed at 5/14/2021 0945  Gross per 24 hour   Intake 330 ml   Output 1100 ml   Net -770 ml     Medications:   sodium chloride      dextrose       Current Facility-Administered Medications   Medication Dose Route Frequency Provider Last Rate Last Admin    LORazepam (ATIVAN) tablet 0.5 mg  0.5 mg Oral Q4H PRN Fatuma Savage MD        ascorbic acid (VITAMIN C) tablet 500 mg  500 mg Oral Daily Fatuma Savage MD   500 mg at 05/14/21 0945    enoxaparin (LOVENOX) injection 80 mg  1 mg/kg Subcutaneous BID Fatuma Savage MD   80 mg at 05/14/21 0946    albuterol sulfate  (90 Base) MCG/ACT inhaler 2 puff  2 puff Inhalation 4x daily Fatuma Savage MD   2 puff at 05/14/21 5375    And    ipratropium (ATROVENT HFA) 17 MCG/ACT inhaler 2 puff  2 puff Inhalation 4x daily Fatuma Savage MD   2 puff at 05/14/21 9944    metoprolol succinate (TOPROL XL) extended release tablet 25 mg  25 mg Oral Daily Israel Vee, DO   25 mg at 05/14/21 0946    melatonin disintegrating tablet 5 mg  5 mg Oral Nightly Glenn Nowak MD   5 mg at 05/13/21 2101    guaiFENesin (MUCINEX) extended release tablet 1,200 mg  1,200 mg Oral BID Julio Gutierrez DO   1,200 mg at 05/14/21 0945    zinc sulfate (ZINCATE) capsule 50 mg  50 mg Oral Daily Israel Vee, DO   50 mg at 05/14/21 0945    sodium chloride flush 0.9 % injection 5-40 mL  5-40 mL Intravenous 2 times per day Julio Gutierrez DO   10 mL at 05/14/21 0946    sodium chloride flush 0.9 % injection 5-40 mL  5-40 mL Intravenous PRN Israel Vee, DO        0.9 % sodium chloride infusion  25 mL Intravenous PRN Julio Gutierrez DO        promethazine (PHENERGAN) tablet 12.5 mg  12.5 mg Oral Q6H PRN Julio Gutierrez DO        Or    ondansetron [facility]) injection 4 mg  4 mg Intravenous Q6H PRN Julio Gutierrez DO        polyethylene glycol (GLYCOLAX) packet 17 g  17 g Oral Daily PRN Amairani [place], DO   17 g at 05/12/21 1116    acetaminophen (TYLENOL) tablet 650 mg  650 mg Oral Q6H PRN Amairani Ra, DO   650 mg at 05/12/21 2125    Or    acetaminophen (TYLENOL) suppository 650 mg  650 mg Rectal Q6H PRN Amairani Ra, DO        guaiFENesin-dextromethorphan (ROBITUSSIN DM) 100-10 MG/5ML syrup 5 mL  5 mL Oral Q4H PRN Amairani [place], DO   5 mL at 05/10/21 0704    dexamethasone (DECADRON) tablet 6 mg  6 mg Oral Daily Israel Vee, DO   6 mg at 05/14/21 0945    Vitamin D (CHOLECALCIFEROL) tablet 2,000 Units  2,000 Units Oral Daily Israel Vee, DO   2,000 Units at 05/14/21 0945    aspirin EC tablet 81 mg  81 mg Oral Daily Israel Vee, DO   81 mg at 05/14/21 0945    famotidine (PEPCID) tablet 20 mg  20 mg Oral BID Israel Vee, DO   20 mg at 05/14/21 0946    cetirizine (ZYRTEC) tablet 10 mg  10 mg Oral Daily Israel Vee, DO   10 mg at 05/14/21 0945    niacin (NIASPAN) extended release tablet 500 mg  500 mg Oral Nightly Israel Vee, DO   500 mg at 05/13/21 2101    insulin lispro (HUMALOG) injection vial 0-12 Units  0-12 Units Subcutaneous TID [facility] Vee, DO   2 Units at 05/13/21 1738    insulin lispro (HUMALOG) injection vial 0-6 Units  0-6 Units Subcutaneous Nightly [place] Vee, DO   1 Units at 05/13/21 2101    glucose (GLUTOSE) 40 % oral gel 15 g  15 g Oral PRN Israel Vee, DO        dextrose 50 % IV solution  12.5 g Intravenous PRN Amairani Ra, DO        glucagon (rDNA) injection 1 mg  1 mg Intramuscular PRN Amairani Ra, DO        dextrose 5 % solution  100 mL/hr Intravenous PRN Amairani [place], DO        potassium chloride (KLOR-CON M) extended release tablet 40 mEq  40 mEq Oral PRN Amairani Ra, DO        Or    potassium bicarb-citric acid (EFFER-K) effervescent tablet 40 mEq  40 mEq Oral PRN Amairanifarrah Knapp DO        Or    potassium chloride 10 mEq/100 mL IVPB (Peripheral Line)  10 mEq Intravenous PRN Amairani Knapp DO            Labs:     Recent Labs     05/12/21  1817 05/13/21  1958 05/14/21  0620   WBC 8.7 8.4 11.2*   RBC 4.12* 4.26 4.49   HGB 12.5 12.6 13.4   HCT 37.3 38.7 40.3   MCV 90.5 90.8 89.8   MCH 30.3 29.6 29.8   MCHC 33.5 32.6* 33.3   * 476* 530*     Recent Labs     05/12/21 0442 05/13/21 0446 05/14/21  0620    137 137   K 4.1 3.9 4.0   ANIONGAP 9 6* 9    100 100   CO2 29 31* 28   BUN 12 12 12   CREATININE 0.5 0.5 0.4*   GLUCOSE 111* 123* 111*   CALCIUM 8.7* 8.6* 8.7*     No results for input(s): MG, PHOS in the last 72 hours. Recent Labs     05/12/21 0442 05/13/21 0446 05/14/21  0620   AST 40* 36* 35*   ALT 26 27 32   BILITOT 0.4 0.4 0.5   ALKPHOS 59 56 55     ABGs:No results for input(s): PH, PO2, PCO2, HCO3, BE, O2SAT in the last 72 hours. Troponin T:   No results for input(s): TROPONINI in the last 72 hours. INR:   No results for input(s): INR in the last 72 hours. Lactic Acid:   No results for input(s): LACTA in the last 72 hours. Objective:   Vitals: BP (!) 142/67   Pulse 85   Temp 98 °F (36.7 °C) (Temporal)   Resp 24   Ht 5' 2\" (1.575 m)   Wt 178 lb (80.7 kg)   SpO2 91%   BMI 32.56 kg/m²   24HR INTAKE/OUTPUT:      Intake/Output Summary (Last 24 hours) at 5/14/2021 1007  Last data filed at 5/14/2021 0945  Gross per 24 hour   Intake 330 ml   Output 1100 ml   Net -770 ml     Physical Exam  Vitals signs and nursing note reviewed. Constitutional:       General: She is not in acute distress. Appearance: She is not diaphoretic. Comments: Patient seen and examined at the door of Covid unit 706 in effort to preserve PPE    Upon the bedside, consuming breakfast, conversational, fatigued appearing   HENT:      Head: Normocephalic and atraumatic. Nose: Nose normal.   Eyes:      Conjunctiva/sclera: Conjunctivae normal.   Cardiovascular:      Rate and Rhythm: Normal rate and regular rhythm. Pulses: Normal pulses. Pulmonary:      Effort: No respiratory distress.       Comments: Nasal cannula oxygen in place-5 L  Neurological: Mental Status: She is oriented to person, place, and time. Mental status is at baseline. Psychiatric:      Comments: Appreciate underlying anxiety           Assessment and Plan:   Principal Problem:    Pneumonia due to COVID-19 virus  Active Problems:    Essential hypertension    Type 2 diabetes mellitus (HCC)    Sepsis ([facility] [address]    Hyponatremia    Hypocalcemia    Hypokalemia    Acute respiratory failure with hypoxia (HCC)    Palliative care patient    Bradycardia, drug induced  Resolved Problems:    * No resolved hospital problems. *      Covid 19  -Continues on dexamethasone day #6/10  -Patient received tocilizumab 5/10/2021  -Addition of inhaler regimen, incentive spirometry  -Continue to wean oxygen, consideration of DC home once stable on 4 L or less  -Continue Lovenox  -LFTs downtrending  -Patient will require home health services upon DC, and Eliquis  -PT/OT, up to chair activity  -Assess home O2 evaluation    Anxiety-continue with p.o. low-dose Ativan as needed    Bradycardia sinus-currently resolved. Continues on telemetry    Hyponatremia-currently resolved    Elevated lactic acid level -continue to encourage p.o. intake, fluids        Advanced directive-full code  DVT prophylaxis-Lovenox  Discharge planning-will anticipate home health once oxygen level stable 4 L were left, home O2 evaluation to be completed        EMR Dragon/Transcription disclaimer:   Much of this encounter note is an electronic transcription/translation of spoken language to printed text.  The electronic translation of spoken language may permit erroneous, or at times, nonsensical words or phrases to be inadvertently transcribed; although attempts have made to review the note for such errors, some may still exist.    Electronically signed by   Lanny Phalen   Internal Medicine Hospitalist  On 5/14/2021  At 10:07 AM

## 2021-05-14 NOTE — CARE COORDINATION
Spoke with patient regarding MD orders for [facility] services. Patient agreeable and has chosen [facility]. Referral Faxed. [address] [place] [address] [phone]. [phone]. Please notify [facility] when patient discharges and fax DC Summary,  DC med list and any new [facility] orders. The Patient was provided with a choice of provider and agrees   with the discharge plan. [x] Yes [] No    Freedom of choice list was provided with basic dialogue that supports the patient's individualized plan of care/goals, treatment preferences and shares the quality data associated with the providers.  [x] Yes [] No  Electronically signed by Ben Shahid on 5/14/2021 at 2:39 PM

## 2021-05-15 VITALS
OXYGEN SATURATION: 95 % | TEMPERATURE: 98 F | DIASTOLIC BLOOD PRESSURE: 73 MMHG | HEART RATE: 95 BPM | WEIGHT: 178 LBS | SYSTOLIC BLOOD PRESSURE: 138 MMHG | HEIGHT: 62 IN | RESPIRATION RATE: 18 BRPM | BODY MASS INDEX: 32.76 KG/M2

## 2021-05-15 LAB
ALBUMIN SERPL-MCNC: 3.4 G/DL (ref 3.5–5.2)
ALP BLD-CCNC: 54 U/L (ref 35–104)
ALT SERPL-CCNC: 37 U/L (ref 5–33)
ANION GAP SERPL CALCULATED.3IONS-SCNC: 9 MMOL/L (ref 7–19)
AST SERPL-CCNC: 37 U/L (ref 5–32)
BASOPHILS ABSOLUTE: 0.1 K/UL (ref 0–0.2)
BASOPHILS RELATIVE PERCENT: 0.4 % (ref 0–1)
BILIRUB SERPL-MCNC: 0.6 MG/DL (ref 0.2–1.2)
BUN BLDV-MCNC: 14 MG/DL (ref 8–23)
CALCIUM SERPL-MCNC: 8.7 MG/DL (ref 8.8–10.2)
CHLORIDE BLD-SCNC: 97 MMOL/L (ref 98–111)
CO2: 26 MMOL/L (ref 22–29)
CREAT SERPL-MCNC: 0.5 MG/DL (ref 0.5–0.9)
EOSINOPHILS ABSOLUTE: 0 K/UL (ref 0–0.6)
EOSINOPHILS RELATIVE PERCENT: 0 % (ref 0–5)
GFR AFRICAN AMERICAN: >59
GFR NON-AFRICAN AMERICAN: >60
GLUCOSE BLD-MCNC: 129 MG/DL (ref 74–109)
HCT VFR BLD CALC: 43.6 % (ref 37–47)
HEMOGLOBIN: 14.3 G/DL (ref 12–16)
IMMATURE GRANULOCYTES #: 0.2 K/UL
LYMPHOCYTES ABSOLUTE: 2.2 K/UL (ref 1.1–4.5)
LYMPHOCYTES RELATIVE PERCENT: 16.9 % (ref 20–40)
MCH RBC QN AUTO: 30.1 PG (ref 27–31)
MCHC RBC AUTO-ENTMCNC: 32.8 G/DL (ref 33–37)
MCV RBC AUTO: 91.8 FL (ref 81–99)
MONOCYTES ABSOLUTE: 1.1 K/UL (ref 0–0.9)
MONOCYTES RELATIVE PERCENT: 8.3 % (ref 0–10)
NEUTROPHILS ABSOLUTE: 9.2 K/UL (ref 1.5–7.5)
NEUTROPHILS RELATIVE PERCENT: 72.6 % (ref 50–65)
PDW BLD-RTO: 12.1 % (ref 11.5–14.5)
PLATELET # BLD: 521 K/UL (ref 130–400)
PMV BLD AUTO: 10.1 FL (ref 9.4–12.3)
POTASSIUM REFLEX MAGNESIUM: 4.8 MMOL/L (ref 3.5–5)
RBC # BLD: 4.75 M/UL (ref 4.2–5.4)
SODIUM BLD-SCNC: 132 MMOL/L (ref 136–145)
TOTAL PROTEIN: 6.4 G/DL (ref 6.6–8.7)
WBC # BLD: 12.7 K/UL (ref 4.8–10.8)

## 2021-05-15 PROCEDURE — 6370000000 HC RX 637 (ALT 250 FOR IP): Performed by: FAMILY MEDICINE

## 2021-05-15 PROCEDURE — 80053 COMPREHEN METABOLIC PANEL: CPT

## 2021-05-15 PROCEDURE — 6360000002 HC RX W HCPCS: Performed by: FAMILY MEDICINE

## 2021-05-15 PROCEDURE — 2700000000 HC OXYGEN THERAPY PER DAY

## 2021-05-15 PROCEDURE — 2580000003 HC RX 258: Performed by: FAMILY MEDICINE

## 2021-05-15 PROCEDURE — 85025 COMPLETE CBC W/AUTO DIFF WBC: CPT

## 2021-05-15 RX ORDER — DEXAMETHASONE 6 MG/1
6 TABLET ORAL DAILY
Qty: 3 TABLET | Refills: 0 | Status: SHIPPED | OUTPATIENT
Start: 2021-05-16 | End: 2021-05-19

## 2021-05-15 RX ORDER — ALBUTEROL SULFATE 90 UG/1
2 AEROSOL, METERED RESPIRATORY (INHALATION) 4 TIMES DAILY
Qty: 1 INHALER | Refills: 0
Start: 2021-05-15

## 2021-05-15 RX ORDER — MECOBALAMIN 5000 MCG
5 TABLET,DISINTEGRATING ORAL NIGHTLY
Qty: 30 TABLET | Refills: 0 | Status: SHIPPED | OUTPATIENT
Start: 2021-05-15 | End: 2021-05-15

## 2021-05-15 RX ORDER — MECOBALAMIN 5000 MCG
5 TABLET,DISINTEGRATING ORAL NIGHTLY
Qty: 30 TABLET | Refills: 0 | Status: SHIPPED | OUTPATIENT
Start: 2021-05-15 | End: 2021-06-14

## 2021-05-15 RX ORDER — PROMETHAZINE HYDROCHLORIDE 12.5 MG/1
12.5 TABLET ORAL 4 TIMES DAILY PRN
Qty: 20 TABLET | Refills: 0 | Status: SHIPPED | OUTPATIENT
Start: 2021-05-15 | End: 2021-05-22

## 2021-05-15 RX ORDER — LORAZEPAM 0.5 MG/1
0.5 TABLET ORAL EVERY 8 HOURS PRN
Qty: 9 TABLET | Refills: 0 | Status: SHIPPED | OUTPATIENT
Start: 2021-05-15 | End: 2021-05-18

## 2021-05-15 RX ORDER — PROMETHAZINE HYDROCHLORIDE 12.5 MG/1
12.5 TABLET ORAL 4 TIMES DAILY PRN
Qty: 20 TABLET | Refills: 0 | Status: SHIPPED | OUTPATIENT
Start: 2021-05-15 | End: 2021-05-15 | Stop reason: SDUPTHER

## 2021-05-15 RX ORDER — LORAZEPAM 0.5 MG/1
0.5 TABLET ORAL EVERY 8 HOURS PRN
Qty: 9 TABLET | Refills: 0 | Status: SHIPPED | OUTPATIENT
Start: 2021-05-15 | End: 2021-05-15

## 2021-05-15 RX ORDER — DEXAMETHASONE 6 MG/1
6 TABLET ORAL DAILY
Qty: 3 TABLET | Refills: 0 | Status: SHIPPED | OUTPATIENT
Start: 2021-05-16 | End: 2021-05-15

## 2021-05-15 RX ADMIN — FAMOTIDINE 20 MG: 20 TABLET, FILM COATED ORAL at 08:32

## 2021-05-15 RX ADMIN — DEXAMETHASONE 6 MG: 2 TABLET ORAL at 08:33

## 2021-05-15 RX ADMIN — IPRATROPIUM BROMIDE 2 PUFF: 17 AEROSOL, METERED RESPIRATORY (INHALATION) at 12:50

## 2021-05-15 RX ADMIN — GUAIFENESIN 1200 MG: 600 TABLET, EXTENDED RELEASE ORAL at 08:32

## 2021-05-15 RX ADMIN — ALBUTEROL SULFATE 2 PUFF: 90 AEROSOL, METERED RESPIRATORY (INHALATION) at 12:51

## 2021-05-15 RX ADMIN — OXYCODONE HYDROCHLORIDE AND ACETAMINOPHEN 500 MG: 500 TABLET ORAL at 08:33

## 2021-05-15 RX ADMIN — IPRATROPIUM BROMIDE 2 PUFF: 17 AEROSOL, METERED RESPIRATORY (INHALATION) at 08:33

## 2021-05-15 RX ADMIN — Medication 2000 UNITS: at 08:33

## 2021-05-15 RX ADMIN — ENOXAPARIN SODIUM 80 MG: 80 INJECTION SUBCUTANEOUS at 08:32

## 2021-05-15 RX ADMIN — CETIRIZINE HYDROCHLORIDE 10 MG: 10 TABLET, FILM COATED ORAL at 08:33

## 2021-05-15 RX ADMIN — SODIUM CHLORIDE, PRESERVATIVE FREE 10 ML: 5 INJECTION INTRAVENOUS at 08:33

## 2021-05-15 RX ADMIN — Medication 50 MG: at 08:33

## 2021-05-15 RX ADMIN — ASPIRIN 81 MG: 81 TABLET, COATED ORAL at 08:33

## 2021-05-15 RX ADMIN — ALBUTEROL SULFATE 2 PUFF: 90 AEROSOL, METERED RESPIRATORY (INHALATION) at 08:34

## 2021-05-15 ASSESSMENT — PAIN SCALES - GENERAL: PAINLEVEL_OUTOF10: 0

## 2021-05-15 NOTE — CONSULTS
**Physician Signature**  This document was electronically signed by: Morales Neves MD  05/15/2021   08:50 AM    **Consult Information**  Member Facility: [address] [place] [address] MRN: [id number]  Visit/Encounter Number: [id number]  Consult ID: [id number]  Facility Time Zone: CT  Date and Time of Request: 05/15/2021 05:55 AM  Requesting Clinician: DR. Mary Ratliff  Patient Name: Evaristo Menon  YOB: 1950  Gender: Female  Patient identity was confirmed at the beginning of the consult with the   patient/family/staff using two personal identifiers: Patient name and       **Admission**  Admission Date: 2021  Chief reason for ICU admission: COVID - 19  Secondary reasons for ICU admission: Respiratory Failure, Pneumonia    **Core Metrics**  General orienting sentence for patient: 1110 AM: 66year old woman dx'ed with   COVID-19 along with spouse who is now worse than she is. Currently on 9L via   high flow. Remdesivir, decadron. Nasal cannula. desaturation and long time to   recover. Trying to self prone sleep last night. 6 liters nasal cannula   overnight. Platelets creeping   up. any exertion. May benefit from klonopin qhs. O2. Ambulating with it and   no desaturation. PRN benzo with good effect for   anxiety. Chief physiologic deterioration: Increase in FiO2 required by   30%  Is the patient on DVT prophylaxis?: Yes  Prophylaxis type: Pharmacological  DVT Prophylaxis Comments: Lovenox being changed to Eliquis  Is the patient on GI prophylaxis?: Yes  Gi Prophylaxis Comments: pepcid  Has this patient reached their nutritional goal?: Yes  Are there current issues with pain management in this patient?:   No  Are there issues with skin integrity?: No  Are there issues with delirium?: No  Has the patient been mobilized?: Yes  Mobilized Details: P. T. working with patient  Is this patient currently intubated?: No  Are there ethical or care philosophy or family issues?: No  Family Issues Details: Spouse is also in ICU  Do you recommend an in depth evaluation?: No  Do you recommend the patient should: : Be downgraded/transitioned out of   ICU

## 2021-05-15 NOTE — DISCHARGE SUMMARY
Discharge Summary  Date:5/15/2021        Patient Name:Glenda Liz Angelucci     YOB: 1950     Age:74 y.o. Admit Date:5/9/2021   Admission Condition:poor   Discharged Condition:fair  Discharge Date: 05/15/21     Discharge Diagnoses   Principal Problem:    Pneumonia due to COVID-19 virus  Active Problems:    Essential hypertension    Type 2 diabetes mellitus ([address]    Sepsis ([facility] [facility]    Hyponatremia    Hypocalcemia    Hypokalemia    Acute respiratory failure with hypoxia (HCC)    Palliative care patient    Bradycardia, drug induced  Resolved Problems:    * No resolved hospital problems. [facility] Stay   Narrative of Hospital Course: Patient was admitted 119, 20-year-old  female presented to the emergency room accompanied by her  with complaint of worsening fatigue weakness as well as shortness of breath for about 1 week time. Recently tested positive for COVID-19 (5/4/2021). Work-up revealed patient hyponatremia 125, elevated lactic acid level as well as to be hypoxic. Patient was admitted to CCU Covid unit. Treated with dexamethasone, received dose of Tocilizumab 5/10/2021. Patient was treated with anticoagulation therapeutically of Lovenox. This x-ray revealed bilateral groundglass infiltrates greater on the left. Oxygen therapy was monitored, assistance of telehealth ICU doctor was appreciated. Patient was able to be weaned down from 14 L to 4 L. Home O2 evaluation was completed in which patient was stable on 4 L, home health measures have been placed prior to patient's discharge. There was note of patient being anxious, low-dose Ativan p.o. 0.5 mg prescription has been provided x3 days. Patient will continue with albuterol inhaler, 3 days of dexamethasone 6 mg. As well as be dosed with Eliquis 5 mg twice daily x30 days. Patient to follow-up with PCP in 1 week for ongoing coordination of care, education modalities been attached at visit summary.       Of note patient was noted to be bradycardic sinus in rhythm, dose adjustments made to patient's metoprolol. Patient should follow-up with primary care provider, reassessment of heart rate once Covid illness is cleared. Consultants:   IP CONSULT TO HOSPITALIST  PALLIATIVE CARE EVAL  IP CONSULT TO HOME CARE NEEDS    Time Spent on Discharge:  45 minutes were spent in patient examination, evaluation, counseling as well as medication reconciliation, prescriptions for required medications, discharge plan and follow up.       Surgeries/Procedures Performed:       Treatments:   analgesia: acetaminophen, anticoagulation: ASA and LMW heparin, steroids: Decadron, insulin: Humalog, respiratory therapy: O2 and butyryl/ipratropium inhaler, therapies: PT and OT and electrolyte monitoring and stabilization    Significant Diagnostic Studies:   Recent Labs:  CBC:   Lab Results   Component Value Date    WBC 12.7 05/15/2021    RBC 4.75 05/15/2021    HGB 14.3 05/15/2021    HCT 43.6 05/15/2021    MCV 91.8 05/15/2021    MCH 30.1 05/15/2021    MCHC 32.8 05/15/2021    RDW 12.1 05/15/2021     05/15/2021     BMP:    Lab Results   Component Value Date    GLUCOSE 129 05/15/2021     05/15/2021    K 4.8 05/15/2021    CL 97 05/15/2021    CO2 26 05/15/2021    ANIONGAP 9 05/15/2021    BUN 14 05/15/2021    CREATININE 0.5 05/15/2021    CALCIUM 8.7 05/15/2021    LABGLOM >60 05/15/2021    GFRAA >59 05/15/2021     HFP:    Lab Results   Component Value Date    PROT 6.4 05/15/2021     CMP:    Lab Results   Component Value Date    GLUCOSE 129 05/15/2021     05/15/2021    K 4.8 05/15/2021    CL 97 05/15/2021    CO2 26 05/15/2021    BUN 14 05/15/2021    CREATININE 0.5 05/15/2021    ANIONGAP 9 05/15/2021    ALKPHOS 54 05/15/2021    ALT 37 05/15/2021    AST 37 05/15/2021    BILITOT 0.6 05/15/2021    LABALBU 3.4 05/15/2021    LABGLOM >60 05/15/2021    GFRAA >59 05/15/2021    PROT 6.4 05/15/2021    CALCIUM 8.7 05/15/2021     PT/INR:    Lab Results   Component Value Date    PROTIME 14.3 05/09/2021    INR 1.11 05/09/2021     PTT:   Lab Results   Component Value Date    APTT 34.3 05/09/2021     FLP:  No results found for: CHOL, TRIG, HDL  U/A:    Lab Results   Component Value Date    COLORU YELLOW 05/09/2021    SPECGRAV 1.004 05/09/2021    PHUR 6.5 05/09/2021    PROTEINU Negative 05/09/2021    GLUCOSEU Negative 05/09/2021    KETUA Negative 05/09/2021    BILIRUBINUR Negative 05/09/2021    UROBILINOGEN 0.2 05/09/2021    NITRU Negative 05/09/2021    LEUKOCYTESUR Negative 05/09/2021     TSH:  No results found for: TSH    Radiology Last 7 Days:  XR CHEST PORTABLE    Result Date: 5/9/2021  Bilateral groundglass infiltrates, slightly greater on the left, compatible with the history of COVID-19 pneumonia. No pneumothorax or pleural effusion is identified. Signed by Dr Shilpa Bach. Saud on 5/9/2021 8:04 AM      Physical Exam  Vitals signs and nursing note reviewed. Constitutional:       General: She is not in acute distress. Appearance: She is not diaphoretic. Comments: Patient sitting up in bedside chair, examined in CCU room 706   HENT:      Head: Normocephalic and atraumatic. Nose: Nose normal.   Eyes:      Conjunctiva/sclera: Conjunctivae normal.   Cardiovascular:      Rate and Rhythm: Normal rate and regular rhythm. Pulses: Normal pulses. Pulmonary:      Breath sounds: No wheezing or rales. Abdominal:      Tenderness: There is no abdominal tenderness. There is no guarding or rebound. Musculoskeletal:         General: No tenderness. Skin:     General: Skin is warm. Neurological:      Mental Status: She is oriented to person, place, and time. Mental status is at baseline.    Psychiatric:         Mood and Affect: Mood normal.           Discharge Plan   Disposition: Home/ with home health     Provider Follow-Up:   [facility] Work[facility]  [address], [address]  [address] [place] [address] will call when discharged from Hospital.         Patient Instructions   Diet: cardiac diet    Activity: activity as tolerated      Discharge Medications         Medication List      START taking these medications    albuterol sulfate  (90 Base) MCG/ACT inhaler  Inhale 2 puffs into the lungs 4 times daily     apixaban 5 MG Tabs tablet  Commonly known as: Eliquis  Take 1 tablet by mouth 2 times daily     dexamethasone 6 MG tablet  Commonly known as: DECADRON  Take 1 tablet by mouth daily for 3 days  Start taking on: May 16, 2021     LORazepam 0.5 MG tablet  Commonly known as: ATIVAN  Take 1 tablet by mouth every 8 hours as needed for Anxiety for up to 3 days.      melatonin 5 MG Tbdp disintegrating tablet  Take 1 tablet by mouth nightly     promethazine 12.5 MG tablet  Commonly known as: PHENERGAN  Take 1 tablet by mouth 4 times daily as needed for Nausea        CONTINUE taking these medications    Allegra Allergy 180 MG tablet  Generic drug: fexofenadine     aspirin 81 MG EC tablet     azelastine 0.1 % nasal spray  Commonly known as: ASTELIN     bisoprolol-hydroCHLOROthiazide 5-6.25 MG per tablet  Commonly known as: ZIAC     Calcium 500/D 500-125 MG-UNIT Tabs  Generic drug: Calcium Carbonate-Vitamin D     niacin 500 MG tablet     Pepcid 20 MG tablet  Generic drug: famotidine     vitamin D 125 MCG (5000 UT) Caps capsule  Commonly known as: CHOLECALCIFEROL     Zofran 4 MG tablet  Generic drug: ondansetron           Where to Get Your Medications      [id number] [address] [place] [address] [address] [address]    Phone: [phone]   · apixaban 5 MG Tabs tablet  · dexamethasone 6 MG tablet  · LORazepam 0.5 MG tablet  · melatonin 5 MG Tbdp disintegrating tablet  · promethazine 12.5 MG tablet     Information about where to get these medications is not yet available    Ask your nurse or doctor about these medications  · albuterol sulfate  (90 Base) MCG/ACT inhaler         EMR Dragon/Transcription disclaimer:   Much of this encounter note is an electronic transcription/translation of spoken language to printed text.  The electronic translation of spoken language may permit erroneous, or at times, nonsensical words or phrases to be inadvertently transcribed; although attempts have made to review the note for such errors, some may still exist.    Electronically signed by      Internal Medicine Hospitalist  On 5/15/2021  At 4:06 PM

## 2021-05-15 NOTE — CARE COORDINATION
CM requested to assist with order 02 for home use. Elian Antoine #[id number] ([id number]:[id number]) (UZ 66 y.o. F) (Adm: 21)  [id number]  [facility]P    MICHAELA MCALLISTER  Demographics  Comment     Last edited by  on  at    Address: Home Phone:  Work Phone: Mobile Phone:   [address] [facility]   [address] [address]    [phone] -- [phone]   SSN: Insurance: Marital Status: Latter day:    Ethelyn Neth  None    Status   No [002]   Insurance Payors as of 5/15/2021    AETNA    Plan: Roxanna Bronatalie MEDICARE SUPP Member: [id number] Effective from: 2015   Subscriber: Gypsy Mckeon Subscriber ID: [id number] Guarantor: [facility]    Plan: MEDICARE PART A AND B Member: [id number] Effective from: 2015   Subscriber: Gypsy Mckeon Subscriber ID: [id number] Guarantor: [facility] to Patient    Power of  Living Will Clinical Unknown Study Attachment Consent Form ABN Waiver After Visit Summary Lab Result Scan Code Status MyChart Status Advance Care Planning   Not on File Not on File Not on File Not on File Not on File Not on File Filed Not on File FULL [Updated on 21 1013] Pending Jump to the Activity    Auth/Cert Information    Create Auth/Cert for Hospital Account [de-identified]   Emergency Contact(s)    Name Relation Home Work Mobile   Ravi Spouse  [facility], Fauzia Faster Child [phone]     Rainerluh Kaur) Child   [phone]   Admission Information    Current Information    Attending Provider Admitting Provider Admission Type Admission Status   Julieth Velasco MD  Emergency Confirmed Admission          Admission Date/Time Discharge Date Hospital Service Auth/Cert Status   67  07:13 AM  Med/Surg [facility] Room/Bed    [facility] [id number] [facility] Account    Name Acct ID Class Status Primary Coverage   Elian Antoine [id number] Inpatient Open MEDICARE - MEDICARE PART A AND B          Guarantor Account (for Hospital Account [de-identified])    Name Relation to Pt Service Area Active? Acct Type   Cristina Ch Self [address] Yes Personal/Family   Address Phone     [address] [facility] [facility] [address] [phone](B)            Coverage Information (for Hospital Account [de-identified])    1. [facility] [place] PART A AND B    F/O Payor/Plan Precert #   MEDICARE/MEDICARE PART A AND B    Subscriber Subscriber #   Cristina Ch [id number]   Address Phone   [address] [facility] Sarthak Little, [address] [phone]   2.  AETNA/AETNA SENIOR MEDICARE SUPP    F/O Payor/Plan Precert #   AETNA/AETNA SENIOR MEDICARE SUPP    Subscriber Subscriber #   Ameliabuddy Dima [id number]   Address Phone   [address] [place] [address] [phone]     Electronically signed by Corrina Parks RN on 5/15/2021 at 10:23 AM

## 2021-05-17 ENCOUNTER — CARE COORDINATION (OUTPATIENT)
Dept: CASE MANAGEMENT | Age: 71
End: 2021-05-17

## 2021-05-18 ENCOUNTER — CARE COORDINATION (OUTPATIENT)
Dept: CASE MANAGEMENT | Age: 71
End: 2021-05-18

## 2021-05-18 LAB
EKG P AXIS: 36 DEGREES
EKG P-R INTERVAL: 200 MS
EKG Q-T INTERVAL: 394 MS
EKG QRS DURATION: 92 MS
EKG QTC CALCULATION (BAZETT): 425 MS
EKG T AXIS: 38 DEGREES

## 2021-05-18 NOTE — CARE COORDINATION
[facility] Transitions Initial Follow Up Call    Call within 2 business days of discharge: Yes    Patient: Miranda Mejias Patient : 1950   MRN: [id number]  Reason for Admission: COVID 19   Discharge Date: 5/15/21 RARS: Readmission Risk Score: 18      Last Discharge [facility]       Complaint Diagnosis Description Type Department Provider    21 Positive For Covid-19; Shortness of Breath Acute respiratory failure with hypoxia ([address] . .. ED to Hosp-Admission (Discharged) (ADMITTED) [facility] CCU Silver Vieira MD; Bismark Welch. .. Spoke with: Kelechi Odgen, daughter    [facility] [address]    Non-face-to-face services provided:  Reviewed encounter information for continuity of care prior to follow up Care Transitions phone call - chart notes, consults, progress notes, test results, med list, appointments, AVS, other information. Care Transitions 24 Hour Call    Schedule Follow Up Appointment with PCP: Completed  Do you have any ongoing symptoms?: No  Do you have a copy of your discharge instructions?: Yes  Do you have all of your prescriptions and are they filled?: Yes  Have you been contacted by a [facility] Pharmacist?: No  Have you scheduled your follow up appointment?: Yes  How are you going to get to your appointment?: Car - family or friend to transport  Were you discharged with any Home Care or Post Acute Services: Yes  Post Acute Services: Home Health  Do you feel like you have everything you need to keep you well at home?: Yes  Care Transitions Interventions         Follow Up  No future appointments. Placed a call to the number listed for patient and spoke with her daughter. She reported that patient is doing very well. She said that she is still alittle weak, fatigued, but otherwise seems to be recovering well. She denied any shortness of breath to speak of, cough, congestion. She said they are checking pulse ox readings and they are good.   She said home health has been out to see her. She has all of her meds and is taking them as ordered. She is eating well, appetite is improving. She is aware of follow up appointments. The daughter is staying with them and helping as needed and has other family available. Patient is walking about, tolerating activity well for the most part. No new needs reported. Discussed symptoms, infection control, quarantine, vaccines, other information. Informed of CTC process, purpose, future calls, etc and is agreeable with appropriate follow up. Ensured to have CTN contact information to be used as needed. Encouraged to call as needed for new issues, questions, etc.  Given the opportunity to ask questions and answered appropriately. CTN to follow up as indicated. Transitions of Care Initial Call    Was this an external facility discharge? No    Challenges to be reviewed by the provider   Additional needs identified to be addressed with provider No       Method of communication with provider : None    Advance Care Planning:   Does patient have an Advance Directive:  not on file. Advance Care Planning   Healthcare Decision Maker:    Primary Decision Maker: Mariana Chapman - Spouse - [phone]    Secondary Decision Maker: Jacquelin Carter - Child - [phone]    Secondary Decision Maker: Chino Morales - Child - [phone]    Was this a readmission? No  Patient stated reason for admission: \"She was positive and kept getting worse. \"  Patients top risk factors for readmission: functional physical ability and medical condition-Pneumonia secondary to [address] Transition Nurse (CTN) contacted the caregiver by telephone to perform post hospital discharge assessment. Verified name and  with caregiver as identifiers. Provided introduction to self, and explanation of the CTN role. CTN reviewed discharge instructions, medical action plan and red flags with caregiver who verbalized understanding.  Caregiver given an opportunity to ask questions and does not have any further questions or concerns at this time. Were discharge instructions available to patient? Yes. Reviewed appropriate site of care based on symptoms and resources available to patient including: PCP, Specialist, Urgent care clinics, Home health, When to call 911 and [address]. The caregiver agrees to contact the PCP office for questions related to their healthcare. Medication reconciliation was performed with caregiver, who verbalizes understanding of administration of home medications. Advised obtaining a 90-day supply of all daily and as-needed medications. Covid Risk Education     Educated patient about risk for severe COVID-19 due to risk factors according to CDC guidelines. CTN reviewed discharge instructions, medical action plan and red flag symptoms caregiver who verbalized understanding. Discussed COVID vaccination status Yes. Education provided on COVID-19 vaccination as appropriate. Discussed exposure protocols and quarantine with CDC Guidelines. Caregiver was given an opportunity to verbalize any questions and concerns and agrees to contact CTN or health care provider for questions related to their healthcare. Reviewed and educated caregiver on any new and changed medications related to discharge diagnosis     Was patient discharged with a pulse oximeter? Yes Discussed and confirmed pulse oximeter discharge instructions and when to notify provider or seek emergency care. CTN provided contact information. Plan for follow-up call in 5-7 days based on severity of symptoms and risk factors.     Xin Nieves RN

## 2021-05-25 ENCOUNTER — CARE COORDINATION (OUTPATIENT)
Dept: CASE MANAGEMENT | Age: 71
End: 2021-05-25

## 2021-05-25 NOTE — CARE COORDINATION
[address] Transitions Follow Up Call    2021    Patient: Janell Villa  Patient : 1950   MRN: [id number]    Discharge Date: 5/15/21 RARS: Readmission Risk Score: 25         Spoke with: [address] [facility] [place] Transitions Subsequent and Final Call    Schedule Follow Up Appointment with PCP: Completed  Subsequent and Final Calls  Do you have any ongoing symptoms?: No  Have your medications changed?: No  Do you have any questions related to your medications?: No  Do you currently have any active services?: Yes  Are you currently active with any services?: Home Health  Do you have any needs or concerns that I can assist you with?: No  Identified Barriers: None  Care Transitions Interventions  Other Interventions: Follow Up  No future appointments. Placed a call to the home and spoke with patient for a follow up. She reported that she is doing some better. She said that she is still wearing the oxygen most of the time. She is sitting outside under a tree right now without it though and it is 97%. She said when she is on it all of the time, she is on 3.5L/min and they are wanting to start weaning her back soon. She said she is eating well, drinking well. She is still a little weak, but reported that she can see a difference every day. She is sleeping well. She said her daughter is staying with them for awhile longer yet to help with big activities, chores, etc.  She is going home this weekend and the son is coming so that the daughter can get a break. She said she has all of her meds and is taking them as ordered. She is not aware of any issues. Home health is still seeing her. She is agreeable to calling prn any needs. CTN to follow up as indicated.      Aiden No RN

## 2021-06-01 ENCOUNTER — CARE COORDINATION (OUTPATIENT)
Dept: CASE MANAGEMENT | Age: 71
End: 2021-06-01

## 2021-06-01 NOTE — CARE COORDINATION
[facility] Transitions Follow Up Call    2021    Patient: Kwaku Walden  Patient : 1950   MRN: [id number]  Reason for Admission: COVID 19   Discharge Date: 5/15/21 RARS: Readmission Risk Score: 25         Spoke with: [address] [place] [address] Transitions Subsequent and Final Call    Subsequent and Final Calls  Do you have any ongoing symptoms?: No  Have your medications changed?: No  Do you have any questions related to your medications?: No  Do you currently have any active services?: No  Are you currently active with any services?: Home Health  Do you have any needs or concerns that I can assist you with?: No  Identified Barriers: None  Care Transitions Interventions  Other Interventions: Follow Up  No future appointments. Placed a call to the patient for follow up. She reported that she is doing very well. She said she is getting stronger, can tell a difference each day. She said she is going without her oxygen during the day. She said at rest, pulse ox is running 97%. With activity, such as bathing, dressing, etc., she may drop to 91-92%. She is still wearing the oxygen at night at 3.5 L/min. She asked about weaning. I did tell her she could try 0.5 L at a time and check it periodically during the night when she wakes up to ensure it is staying in the 90s. She said she would. Denied any cough, congestion, other symptoms. Is eating and drinking better. Sleeping well. Has her meds and is taking them. Has no other issues at this time. She and her  are beginning to see after themselves a little more. Family still available as needed. To call prn issues. CTN to follow up as indicated.      Melania Samson RN

## 2021-06-09 ENCOUNTER — CARE COORDINATION (OUTPATIENT)
Dept: CASE MANAGEMENT | Age: 71
End: 2021-06-09

## 2021-06-09 NOTE — CARE COORDINATION
[address] Transitions Follow Up Call    2021    Patient: Karol Heimlich  Patient : 1950   MRN: [id number]  Reason for Admission: COVID 19  Discharge Date: 5/15/21 RARS: Readmission Risk Score: 25         Spoke with: [employer] [employer]  Transitions Subsequent and Final Call    Subsequent and Final Calls  Do you have any ongoing symptoms?: No  Have your medications changed?: No  Do you have any questions related to your medications?: No  Do you currently have any active services?: No  Are you currently active with any services?: Home Health  Do you have any needs or concerns that I can assist you with?: No  Identified Barriers: None  Care Transitions Interventions  Other Interventions:         Placed a call to the patient and spoke with her for a follow up call. She reported that she is doing very well. She said she is feeling much better. She is getting her strength back and as long as she paces herself, she is able to do some simple chores around the house. She said she is only wearing her oxygen at night. She said it is down to 2L/min. She is staying in the 96-97% range with her pulse ox. She is eating better, drinking well, sleeping well. She has all of her meds and is taking them. She is not aware of any issues. She said there is a big improvement over the past few days. To call prn any needs. CTN to follow up as indicated. Follow Up  No future appointments.     Rosario Vegas RN

## 2021-06-16 ENCOUNTER — CARE COORDINATION (OUTPATIENT)
Dept: CASE MANAGEMENT | Age: 71
End: 2021-06-16

## 2021-06-16 NOTE — CARE COORDINATION
[facility] Transitions Follow Up Call    2021    Patient: Satish Christiansen  Patient : 1950   MRN: [id number]    Discharge Date: 5/15/21 RARS: Readmission Risk Score: 25         Spoke with: [address]  Transitions Subsequent and Final Call    Schedule Follow Up Appointment with PCP: Completed  Subsequent and Final Calls  Do you have any ongoing symptoms?: No  Have your medications changed?: No  Do you have any questions related to your medications?: No  Do you currently have any active services?: No  Are you currently active with any services?: Home Health  Do you have any needs or concerns that I can assist you with?: No  Identified Barriers: None  Care Transitions Interventions  Other Interventions:         Placed a call to the home and spoke with patient for a follow up call. She reported that the doctor has released her as of today as far as COVID 19 is concerned. She said she had a chest xray and lab work done. He feels that will all be normal and if so she is considered well. He does want her to continue the oxygen at 1 L/min for 3-4-5 more days until she is sure she is doing ok and then wean off and use prn. She said she is doing normal activities, sleeping well, etc.  Said her appetite is good, drinking well. No new medication or treatment changes mentioned. Will call prn any needs. CTN to follow up as indicated. Follow Up  No future appointments.     Maura James RN

## 2021-06-16 NOTE — CARE COORDINATION
[facility] Transitions Follow Up Call    2021    Patient: Lencho Harris  Patient : 1950   MRN: [id number]    Discharge Date: 5/15/21 RARS: Readmission Risk Score: 18         Spoke with: [address] \[facility]\"" Transitions Subsequent and Final Call    Subsequent and Final Calls  Are you currently active with any services?: Home Health  Care Transitions Interventions  Other Interventions:         Placed a call to the patient for follow up. She asked that I call back at a later time as she is expecting a call from the doctor at any minute. Will try again later. Follow Up  No future appointments.     Michelle Dickey RN

## 2021-06-24 ENCOUNTER — CARE COORDINATION (OUTPATIENT)
Dept: CASE MANAGEMENT | Age: 71
End: 2021-06-24

## 2021-06-24 NOTE — CARE COORDINATION
[facility] Transitions Follow Up Call    2021    Patient: Carlitos Lombardo  Patient : 1950   MRN: [id number]    Discharge Date: 5/15/21 RARS: Readmission Risk Score: 25         Spoke with: [address] [place] [address] Transitions Subsequent and Final Call    Schedule Follow Up Appointment with PCP: Completed  Subsequent and Final Calls  Do you have any ongoing symptoms?: No  Have your medications changed?: No  Do you have any questions related to your medications?: No  Do you currently have any active services?: No  Are you currently active with any services?: Home Health  Do you have any needs or concerns that I can assist you with?: No  Identified Barriers: None  Care Transitions Interventions  Other Interventions:         Placed a call to the patient and spoke with her for follow up. She reported that she is doing much better. She said that she is going without oxygen at all now, as of last Saturday night. She did say that her last chest xray still showed a little pneumonia, but not enough to be placed on antbiotics. She said that she still has some issues with energy, but is better than she was. She is able to drive some. She said she is doing chores and other tasks about the house. She said she feels well, just lack of energy, stamina. Eating, drinking well, sleeping well. Has all of her meds and is taking them as ordered. Aware of future follow ups. To call prn issues. CTN to sign off effective today. Follow Up  No future appointments.     Spenser Santa RN

## 2021-07-19 NOTE — PROGRESS NOTES
Hospitalist Progress Note  5/11/2021 10:54 AM  Subjective:   Admit Date: 5/9/2021  PCP: Lesley Metzger MD    Chief Complaint: fatigue, shortness of breath    Subjective: Patient feels she is breathing a little more easily. Had some productive cough yesterday but it has slowed down. She was down to 5 LPM most of the day but desaturated with light activity, requiring higher flow rates. Tolerated tocilizumab yesterday. History is otherwise unchanged. Cumulative Hospital History:   5-9: Presents to ED with her  with complaint of worsening fatigue, weakness, shortness of breath x1 week, recently tested positive for COVID-19. Some nausea, vomiting, diarrhea. Serum sodium quite low at 125, lactic acid elevated, hypoxic in ED. Admitted to CCU on dexamethasone, supportive treatment, supplemental oxygen. 5-10: Increasing oxygen requirements overnight. CRP elevated, tocilizumab given. Scheduled guaifenesin. Add zinc for supportive care. 5-11: Down to 5 LPM high-flow for most of the last 24 hours, though she desaturates with light activity. Feels a little better. ROS: 14 point review of systems is negative except as specifically addressed above.     DIET LOW SODIUM 2 GM; Dry Tray    Intake/Output Summary (Last 24 hours) at 5/11/2021 1054  Last data filed at 5/11/2021 1021  Gross per 24 hour   Intake 1719.33 ml   Output 1200 ml   Net 519.33 ml     Medications:   sodium chloride      dextrose       Current Facility-Administered Medications   Medication Dose Route Frequency Provider Last Rate Last Admin    guaiFENesin (MUCINEX) extended release tablet 1,200 mg  1,200 mg Oral BID Israel Vee, DO   1,200 mg at 05/11/21 0745    zinc sulfate (ZINCATE) capsule 50 mg  50 mg Oral Daily Israel Vee, DO   50 mg at 05/11/21 0745    sodium chloride flush 0.9 % injection 5-40 mL  5-40 mL Intravenous 2 times per day Stacey Sales, DO   10 mL at 05/11/21 0746    sodium chloride flush 0.9 % injection 5-40 mL  5-40 mL Intravenous Spoke to patient and she is getting her period every 4 weeks and would now like to wait until next month.  She is getting her period every 4 weeks advised if she knows when it is coming to set something up with Dr. Frances for that week.    PRN Marino Dayanna, DO        0.9 % sodium chloride infusion  25 mL Intravenous PRN Marino Peraltan, DO        enoxaparin (LOVENOX) injection 30 mg  30 mg Subcutaneous BID Israel Vee, DO   30 mg at 05/11/21 0746    promethazine (PHENERGAN) tablet 12.5 mg  12.5 mg Oral Q6H PRN Marino Alan, DO        Or    [facility]etron [facility] injection 4 mg  4 mg Intravenous Q6H PRN Marino Dayanna, DO        polyethylene glycol (GLYCOLAX) packet 17 g  17 g Oral Daily PRN Marino Alan, DO        acetaminophen (TYLENOL) tablet 650 mg  650 mg Oral Q6H PRN [place] Vee, DO   650 mg at 05/09/21 1531    Or    acetaminophen (TYLENOL) suppository 650 mg  650 mg Rectal Q6H PRN Banner Dayanna, DO        guaiFENesin-dextromethorphan (ROBITUSSIN DM) 100-10 MG/5ML syrup 5 mL  5 mL Oral Q4H PRN Banner Dayanna, DO   5 mL at 05/10/21 0704    dexamethasone (DECADRON) tablet 6 mg  6 mg Oral Daily [place]ing Ward, DO   6 mg at 05/11/21 0745    Vitamin D (CHOLECALCIFEROL) tablet 2,000 Units  2,000 Units Oral Daily [place] Ward, DO   2,000 Units at 05/11/21 0745    aspirin EC tablet 81 mg  81 mg Oral Daily Lon[place]ing Ward, DO   81 mg at 05/11/21 0746    famotidine (PEPCID) tablet 20 mg  20 mg Oral BID [place] Vee, DO   20 mg at 05/11/21 0745    cetirizine (ZYRTEC) tablet 10 mg  10 mg Oral Daily [place] Vee, DO   10 mg at 05/11/21 0746    niacin (NIASPAN) extended release tablet 500 mg  500 mg Oral Nightly [place] Vee, DO   500 mg at 05/10/21 2021    metoprolol succinate (TOPROL XL) extended release tablet 50 mg  50 mg Oral Daily Israel Vee, DO   50 mg at 05/11/21 0746    insulin lispro (HUMALOG) injection vial 0-12 Units  0-12 Units Subcutaneous TID WC [place] Vee, DO   2 Units at 05/10/21 1728    insulin lispro (HUMALOG) injection vial 0-6 Units  0-6 Units Subcutaneous Nightly Israel Vee, DO   1 Units at 05/09/21 2021    glucose (GLUTOSE) 40 % oral gel 15 g  15 g Oral PRN Jordi Vee, DO        dextrose 50 % IV solution  12.5 g Intravenous PRN Marino Alan,         glucagon (rDNA) injection 1 mg 1 mg Intramuscular PRN Xochitl Finer, DO        dextrose 5 % solution  100 mL/hr Intravenous PRN Xochitl Finer, DO        potassium chloride (KLOR-CON M) extended release tablet 40 mEq  40 mEq Oral PRN Xochitl Finer, DO        Or    potassium bicarb-citric acid (EFFER-K) effervescent tablet 40 mEq  40 mEq Oral PRN Xochitl Finer, DO        Or    potassium chloride 10 mEq/100 mL IVPB (Peripheral Line)  10 mEq Intravenous PRN Xochitl Finer, DO            Labs:     Recent Labs     05/09/21  1200 05/10/21  0410 05/11/21  0445   WBC 9.6 8.1 7.8   RBC 4.45 3.96* 3.83*   HGB 13.1 11.9* 11.5*   HCT 39.7 35.3* 35.0*   MCV 89.2 89.1 91.4   MCH 29.4 30.1 30.0   MCHC 33.0 33.7 32.9*    301 357     Recent Labs     05/10/21  0410 05/10/21  1045 05/11/21  0445    138 138   K 3.8 3.9 3.8   ANIONGAP 9 9 7    101 102   CO2 26 28 29   BUN 7* 7* 9   CREATININE 0.4* 0.5 0.4*   GLUCOSE 129* 152* 137*   CALCIUM 8.0* 8.4* 8.3*     Recent Labs     05/09/21  0715   MG 1.7     Recent Labs     05/09/21  1200 05/10/21  0410 05/11/21  0445   AST 50* 42* 42*   ALT 25 20 22   BILITOT 0.4 0.3 0.3   ALKPHOS 52 45 56     ABGs:No results for input(s): PH, PO2, PCO2, HCO3, BE, O2SAT in the last 72 hours. Troponin T:   Recent Labs     05/09/21  0715   TROPONINI <0.01     INR:   Recent Labs     05/09/21  0740   INR 1.11     Lactic Acid:   Recent Labs     05/09/21 1200   LACTA 2.5*       Objective:   Vitals: /77   Pulse 68   Temp 98 °F (36.7 °C) (Temporal)   Resp (!) 34   Wt 178 lb (80.7 kg)   SpO2 96%   24HR INTAKE/OUTPUT:      Intake/Output Summary (Last 24 hours) at 5/11/2021 1054  Last data filed at 5/11/2021 1021  Gross per 24 hour   Intake 1719.33 ml   Output 1200 ml   Net 519.33 ml     Examined remotely to conserve PPE. Auscultatory findings based on nursing examination.   General appearance: alert and cooperative with exam  HEENT: atraumatic, eyes with clear conjunctiva and normal lids, pupils and irises normal, external ears and nose are normal, lips normal  Neck without masses, lympadenopathy, bruit, thyroid normal  Lungs: no increased work of breathing, diminished breath sounds bilaterally  Heart: regular rate and rhythm, S1, S2 normal, no murmur, click, rub or gallop  Abdomen: soft, non-tender; bowel sounds normal; no masses,  no organomegaly  Extremities: extremities normal, atraumatic, no cyanosis or edema  Neurologic: no focal neurologic deficits, normal sensation, alert and oriented, affect and mood appropriate  Skin: no rashes, nodules    Assessment and Plan:   Principal Problem:    Pneumonia due to COVID-19 virus  Active Problems:    Essential hypertension    Type 2 diabetes mellitus (HCC)    Sepsis (HCC)    Hyponatremia    Hypocalcemia    Hypokalemia    Acute respiratory failure with hypoxia (HCC)    Palliative care patient  Resolved Problems:    * No resolved hospital problems. *      Day #3 of 10 dexamethasone. Received tocilizumab 5-10. Scheduled guaifenesin to promote mucus production. Supportive care. Sodium normalized with fluid bolus. Continue to wean supplemental oxygen as tolerated.     Advance Directive: Full Code    DVT prophylaxis: enoxaparin    Discharge planning: DO Renee Jansen Hospitalist

## 2022-05-22 ENCOUNTER — APPOINTMENT (OUTPATIENT)
Dept: GENERAL RADIOLOGY | Age: 72
End: 2022-05-22
Payer: MEDICARE

## 2022-05-22 ENCOUNTER — HOSPITAL ENCOUNTER (OUTPATIENT)
Age: 72
Setting detail: OBSERVATION
Discharge: HOME | End: 2022-05-24
Attending: FAMILY MEDICINE
Payer: MEDICARE

## 2022-05-22 ENCOUNTER — APPOINTMENT (OUTPATIENT)
Dept: CT IMAGING | Age: 72
End: 2022-05-22
Payer: MEDICARE

## 2022-05-22 DIAGNOSIS — R20.0 NUMBNESS AND TINGLING: ICD-10-CM

## 2022-05-22 DIAGNOSIS — G45.9 TIA (TRANSIENT ISCHEMIC ATTACK): Primary | ICD-10-CM

## 2022-05-22 DIAGNOSIS — E04.1 NODULE OF RIGHT LOBE OF THYROID GLAND: ICD-10-CM

## 2022-05-22 DIAGNOSIS — R20.2 PARESTHESIA: ICD-10-CM

## 2022-05-22 DIAGNOSIS — R20.2 NUMBNESS AND TINGLING: ICD-10-CM

## 2022-05-22 PROBLEM — R29.90 STROKE-LIKE SYMPTOMS: Status: ACTIVE | Noted: 2022-05-22

## 2022-05-22 LAB
ALBUMIN SERPL-MCNC: 4 G/DL (ref 3.5–5.2)
ALP BLD-CCNC: 67 U/L (ref 35–104)
ALT SERPL-CCNC: 19 U/L (ref 5–33)
ANION GAP SERPL CALCULATED.3IONS-SCNC: 14 MMOL/L (ref 7–19)
AST SERPL-CCNC: 25 U/L (ref 5–32)
BASOPHILS ABSOLUTE: 0.1 K/UL (ref 0–0.2)
BASOPHILS RELATIVE PERCENT: 0.8 % (ref 0–1)
BILIRUB SERPL-MCNC: 0.3 MG/DL (ref 0.2–1.2)
BUN BLDV-MCNC: 13 MG/DL (ref 8–23)
CALCIUM SERPL-MCNC: 9.2 MG/DL (ref 8.8–10.2)
CHLORIDE BLD-SCNC: 101 MMOL/L (ref 98–111)
CO2: 23 MMOL/L (ref 22–29)
CREAT SERPL-MCNC: 0.7 MG/DL (ref 0.5–0.9)
EOSINOPHILS ABSOLUTE: 0.1 K/UL (ref 0–0.6)
EOSINOPHILS RELATIVE PERCENT: 0.8 % (ref 0–5)
GFR AFRICAN AMERICAN: >59
GFR NON-AFRICAN AMERICAN: >60
GLUCOSE BLD-MCNC: 152 MG/DL (ref 70–99)
GLUCOSE BLD-MCNC: 192 MG/DL (ref 74–109)
HCT VFR BLD CALC: 42.5 % (ref 37–47)
HEMOGLOBIN: 13.4 G/DL (ref 12–16)
IMMATURE GRANULOCYTES #: 0 K/UL
INR BLD: 0.95 (ref 0.88–1.18)
LYMPHOCYTES ABSOLUTE: 4.1 K/UL (ref 1.1–4.5)
LYMPHOCYTES RELATIVE PERCENT: 43.8 % (ref 20–40)
MCH RBC QN AUTO: 29.4 PG (ref 27–31)
MCHC RBC AUTO-ENTMCNC: 31.5 G/DL (ref 33–37)
MCV RBC AUTO: 93.2 FL (ref 81–99)
MONOCYTES ABSOLUTE: 0.7 K/UL (ref 0–0.9)
MONOCYTES RELATIVE PERCENT: 7.8 % (ref 0–10)
NEUTROPHILS ABSOLUTE: 4.3 K/UL (ref 1.5–7.5)
NEUTROPHILS RELATIVE PERCENT: 46.6 % (ref 50–65)
PDW BLD-RTO: 12.6 % (ref 11.5–14.5)
PERFORMED ON: ABNORMAL
PLATELET # BLD: 278 K/UL (ref 130–400)
PMV BLD AUTO: 10.7 FL (ref 9.4–12.3)
POTASSIUM REFLEX MAGNESIUM: 3.6 MMOL/L (ref 3.5–5)
PROTHROMBIN TIME: 12.6 SEC (ref 12–14.6)
RBC # BLD: 4.56 M/UL (ref 4.2–5.4)
SARS-COV-2, NAAT: NOT DETECTED
SODIUM BLD-SCNC: 138 MMOL/L (ref 136–145)
TOTAL PROTEIN: 6.9 G/DL (ref 6.6–8.7)
TROPONIN: <0.01 NG/ML (ref 0–0.03)
WBC # BLD: 9.2 K/UL (ref 4.8–10.8)

## 2022-05-22 PROCEDURE — 85610 PROTHROMBIN TIME: CPT

## 2022-05-22 PROCEDURE — 36415 COLL VENOUS BLD VENIPUNCTURE: CPT

## 2022-05-22 PROCEDURE — 6360000004 HC RX CONTRAST MEDICATION: Performed by: FAMILY MEDICINE

## 2022-05-22 PROCEDURE — 84484 ASSAY OF TROPONIN QUANT: CPT

## 2022-05-22 PROCEDURE — 70450 CT HEAD/BRAIN W/O DYE: CPT

## 2022-05-22 PROCEDURE — 85025 COMPLETE CBC W/AUTO DIFF WBC: CPT

## 2022-05-22 PROCEDURE — 70496 CT ANGIOGRAPHY HEAD: CPT

## 2022-05-22 PROCEDURE — 6370000000 HC RX 637 (ALT 250 FOR IP)

## 2022-05-22 PROCEDURE — 6370000000 HC RX 637 (ALT 250 FOR IP): Performed by: FAMILY MEDICINE

## 2022-05-22 PROCEDURE — 99285 EMERGENCY DEPT VISIT HI MDM: CPT

## 2022-05-22 PROCEDURE — 87635 SARS-COV-2 COVID-19 AMP PRB: CPT

## 2022-05-22 PROCEDURE — 93005 ELECTROCARDIOGRAM TRACING: CPT | Performed by: FAMILY MEDICINE

## 2022-05-22 PROCEDURE — 80053 COMPREHEN METABOLIC PANEL: CPT

## 2022-05-22 PROCEDURE — G0378 HOSPITAL OBSERVATION PER HR: HCPCS

## 2022-05-22 PROCEDURE — 71045 X-RAY EXAM CHEST 1 VIEW: CPT

## 2022-05-22 RX ORDER — B-COMPLEX WITH VITAMIN C
25 TABLET ORAL DAILY
COMMUNITY

## 2022-05-22 RX ORDER — FAMOTIDINE 20 MG/1
20 TABLET, FILM COATED ORAL DAILY
Status: DISCONTINUED | OUTPATIENT
Start: 2022-05-23 | End: 2022-05-24 | Stop reason: HOSPADM

## 2022-05-22 RX ORDER — ATORVASTATIN CALCIUM 40 MG/1
80 TABLET, FILM COATED ORAL NIGHTLY
Status: DISCONTINUED | OUTPATIENT
Start: 2022-05-22 | End: 2022-05-24 | Stop reason: HOSPADM

## 2022-05-22 RX ORDER — ONDANSETRON 2 MG/ML
4 INJECTION INTRAMUSCULAR; INTRAVENOUS EVERY 6 HOURS PRN
Status: DISCONTINUED | OUTPATIENT
Start: 2022-05-22 | End: 2022-05-24 | Stop reason: HOSPADM

## 2022-05-22 RX ORDER — NIACIN 500 MG/1
500 TABLET, EXTENDED RELEASE ORAL NIGHTLY
Status: DISCONTINUED | OUTPATIENT
Start: 2022-05-22 | End: 2022-05-24 | Stop reason: HOSPADM

## 2022-05-22 RX ORDER — ASPIRIN 81 MG/1
81 TABLET ORAL DAILY
Status: DISCONTINUED | OUTPATIENT
Start: 2022-05-23 | End: 2022-05-24 | Stop reason: HOSPADM

## 2022-05-22 RX ORDER — ALBUTEROL SULFATE 90 UG/1
2 AEROSOL, METERED RESPIRATORY (INHALATION) 4 TIMES DAILY
Status: DISCONTINUED | OUTPATIENT
Start: 2022-05-22 | End: 2022-05-23 | Stop reason: CLARIF

## 2022-05-22 RX ORDER — INSULIN LISPRO 100 [IU]/ML
0-3 INJECTION, SOLUTION INTRAVENOUS; SUBCUTANEOUS NIGHTLY
Status: DISCONTINUED | OUTPATIENT
Start: 2022-05-22 | End: 2022-05-24 | Stop reason: HOSPADM

## 2022-05-22 RX ORDER — MECOBALAMIN 5000 MCG
5 TABLET,DISINTEGRATING ORAL NIGHTLY
Status: DISCONTINUED | OUTPATIENT
Start: 2022-05-22 | End: 2022-05-24 | Stop reason: HOSPADM

## 2022-05-22 RX ORDER — DEXTROSE MONOHYDRATE 50 MG/ML
100 INJECTION, SOLUTION INTRAVENOUS PRN
Status: DISCONTINUED | OUTPATIENT
Start: 2022-05-22 | End: 2022-05-24 | Stop reason: HOSPADM

## 2022-05-22 RX ORDER — INSULIN LISPRO 100 [IU]/ML
0-6 INJECTION, SOLUTION INTRAVENOUS; SUBCUTANEOUS
Status: DISCONTINUED | OUTPATIENT
Start: 2022-05-23 | End: 2022-05-24 | Stop reason: HOSPADM

## 2022-05-22 RX ORDER — ASCORBIC ACID 500 MG
500 TABLET ORAL DAILY
COMMUNITY

## 2022-05-22 RX ORDER — ASPIRIN 325 MG
325 TABLET ORAL ONCE
Status: COMPLETED | OUTPATIENT
Start: 2022-05-22 | End: 2022-05-22

## 2022-05-22 RX ORDER — POLYETHYLENE GLYCOL 3350 17 G/17G
17 POWDER, FOR SOLUTION ORAL DAILY PRN
Status: DISCONTINUED | OUTPATIENT
Start: 2022-05-22 | End: 2022-05-24 | Stop reason: HOSPADM

## 2022-05-22 RX ORDER — ASPIRIN 300 MG/1
300 SUPPOSITORY RECTAL DAILY
Status: DISCONTINUED | OUTPATIENT
Start: 2022-05-23 | End: 2022-05-24 | Stop reason: HOSPADM

## 2022-05-22 RX ORDER — ENOXAPARIN SODIUM 100 MG/ML
40 INJECTION SUBCUTANEOUS DAILY
Status: DISCONTINUED | OUTPATIENT
Start: 2022-05-23 | End: 2022-05-24 | Stop reason: HOSPADM

## 2022-05-22 RX ORDER — ONDANSETRON 4 MG/1
4 TABLET, ORALLY DISINTEGRATING ORAL EVERY 8 HOURS PRN
Status: DISCONTINUED | OUTPATIENT
Start: 2022-05-22 | End: 2022-05-24 | Stop reason: HOSPADM

## 2022-05-22 RX ADMIN — ATORVASTATIN CALCIUM 80 MG: 40 TABLET, FILM COATED ORAL at 23:27

## 2022-05-22 RX ADMIN — IOPAMIDOL 75 ML: 755 INJECTION, SOLUTION INTRAVENOUS at 19:53

## 2022-05-22 RX ADMIN — ASPIRIN 325 MG: 325 TABLET ORAL at 21:01

## 2022-05-22 RX ADMIN — Medication 5 MG: at 23:27

## 2022-05-22 ASSESSMENT — ENCOUNTER SYMPTOMS
TROUBLE SWALLOWING: 0
ABDOMINAL DISTENTION: 0
CHEST TIGHTNESS: 0
APNEA: 0
CONSTIPATION: 0
SHORTNESS OF BREATH: 0
DIARRHEA: 0
GASTROINTESTINAL NEGATIVE: 1
WHEEZING: 0
VOMITING: 0
SORE THROAT: 0
RESPIRATORY NEGATIVE: 1
ABDOMINAL PAIN: 0
COUGH: 0
BACK PAIN: 0

## 2022-05-22 ASSESSMENT — PAIN - FUNCTIONAL ASSESSMENT: PAIN_FUNCTIONAL_ASSESSMENT: NONE - DENIES PAIN

## 2022-05-23 ENCOUNTER — APPOINTMENT (OUTPATIENT)
Dept: MRI IMAGING | Age: 72
End: 2022-05-23
Payer: MEDICARE

## 2022-05-23 LAB
ANION GAP SERPL CALCULATED.3IONS-SCNC: 10 MMOL/L (ref 7–19)
BUN BLDV-MCNC: 9 MG/DL (ref 8–23)
CALCIUM SERPL-MCNC: 9.1 MG/DL (ref 8.8–10.2)
CHLORIDE BLD-SCNC: 105 MMOL/L (ref 98–111)
CHOLESTEROL, TOTAL: 190 MG/DL (ref 160–199)
CO2: 24 MMOL/L (ref 22–29)
CREAT SERPL-MCNC: 0.5 MG/DL (ref 0.5–0.9)
GFR AFRICAN AMERICAN: >59
GFR NON-AFRICAN AMERICAN: >60
GLUCOSE BLD-MCNC: 128 MG/DL (ref 74–109)
GLUCOSE BLD-MCNC: 129 MG/DL (ref 70–99)
GLUCOSE BLD-MCNC: 150 MG/DL (ref 70–99)
GLUCOSE BLD-MCNC: 153 MG/DL (ref 70–99)
GLUCOSE BLD-MCNC: 178 MG/DL (ref 70–99)
HBA1C MFR BLD: 6.2 % (ref 4–6)
HCT VFR BLD CALC: 40.1 % (ref 37–47)
HDLC SERPL-MCNC: 43 MG/DL (ref 65–121)
HEMOGLOBIN: 13.1 G/DL (ref 12–16)
LDL CHOLESTEROL CALCULATED: 112 MG/DL
LV EF: 60 %
LVEF MODALITY: NORMAL
MCH RBC QN AUTO: 29.9 PG (ref 27–31)
MCHC RBC AUTO-ENTMCNC: 32.7 G/DL (ref 33–37)
MCV RBC AUTO: 91.6 FL (ref 81–99)
PDW BLD-RTO: 12.5 % (ref 11.5–14.5)
PERFORMED ON: ABNORMAL
PLATELET # BLD: 256 K/UL (ref 130–400)
PMV BLD AUTO: 10.6 FL (ref 9.4–12.3)
POTASSIUM REFLEX MAGNESIUM: 4.1 MMOL/L (ref 3.5–5)
RBC # BLD: 4.38 M/UL (ref 4.2–5.4)
SODIUM BLD-SCNC: 139 MMOL/L (ref 136–145)
TRIGL SERPL-MCNC: 177 MG/DL (ref 0–149)
WBC # BLD: 9.8 K/UL (ref 4.8–10.8)

## 2022-05-23 PROCEDURE — 6370000000 HC RX 637 (ALT 250 FOR IP)

## 2022-05-23 PROCEDURE — 92523 SPEECH SOUND LANG COMPREHEN: CPT

## 2022-05-23 PROCEDURE — 80048 BASIC METABOLIC PNL TOTAL CA: CPT

## 2022-05-23 PROCEDURE — 96372 THER/PROPH/DIAG INJ SC/IM: CPT

## 2022-05-23 PROCEDURE — 97535 SELF CARE MNGMENT TRAINING: CPT

## 2022-05-23 PROCEDURE — 6370000000 HC RX 637 (ALT 250 FOR IP): Performed by: INTERNAL MEDICINE

## 2022-05-23 PROCEDURE — 94640 AIRWAY INHALATION TREATMENT: CPT

## 2022-05-23 PROCEDURE — 82947 ASSAY GLUCOSE BLOOD QUANT: CPT

## 2022-05-23 PROCEDURE — 97161 PT EVAL LOW COMPLEX 20 MIN: CPT

## 2022-05-23 PROCEDURE — 97116 GAIT TRAINING THERAPY: CPT

## 2022-05-23 PROCEDURE — 83036 HEMOGLOBIN GLYCOSYLATED A1C: CPT

## 2022-05-23 PROCEDURE — 6360000002 HC RX W HCPCS

## 2022-05-23 PROCEDURE — 93306 TTE W/DOPPLER COMPLETE: CPT

## 2022-05-23 PROCEDURE — 97165 OT EVAL LOW COMPLEX 30 MIN: CPT

## 2022-05-23 PROCEDURE — A9577 INJ MULTIHANCE: HCPCS | Performed by: PSYCHIATRY & NEUROLOGY

## 2022-05-23 PROCEDURE — 80061 LIPID PANEL: CPT

## 2022-05-23 PROCEDURE — 70553 MRI BRAIN STEM W/O & W/DYE: CPT

## 2022-05-23 PROCEDURE — 6360000004 HC RX CONTRAST MEDICATION: Performed by: PSYCHIATRY & NEUROLOGY

## 2022-05-23 PROCEDURE — 99220 PR INITIAL OBSERVATION CARE/DAY 70 MINUTES: CPT | Performed by: PSYCHIATRY & NEUROLOGY

## 2022-05-23 PROCEDURE — G0378 HOSPITAL OBSERVATION PER HR: HCPCS

## 2022-05-23 PROCEDURE — 85027 COMPLETE CBC AUTOMATED: CPT

## 2022-05-23 PROCEDURE — 92610 EVALUATE SWALLOWING FUNCTION: CPT

## 2022-05-23 PROCEDURE — 36415 COLL VENOUS BLD VENIPUNCTURE: CPT

## 2022-05-23 RX ORDER — INSULIN GLARGINE 100 [IU]/ML
20 INJECTION, SOLUTION SUBCUTANEOUS NIGHTLY
Status: DISCONTINUED | OUTPATIENT
Start: 2022-05-23 | End: 2022-05-24 | Stop reason: HOSPADM

## 2022-05-23 RX ORDER — ALBUTEROL SULFATE 2.5 MG/3ML
2.5 SOLUTION RESPIRATORY (INHALATION) 4 TIMES DAILY
Status: DISCONTINUED | OUTPATIENT
Start: 2022-05-23 | End: 2022-05-24 | Stop reason: HOSPADM

## 2022-05-23 RX ORDER — INSULIN LISPRO 100 [IU]/ML
5 INJECTION, SOLUTION INTRAVENOUS; SUBCUTANEOUS
Status: DISCONTINUED | OUTPATIENT
Start: 2022-05-23 | End: 2022-05-24 | Stop reason: HOSPADM

## 2022-05-23 RX ORDER — ACETAMINOPHEN 325 MG/1
650 TABLET ORAL EVERY 4 HOURS PRN
Status: DISCONTINUED | OUTPATIENT
Start: 2022-05-23 | End: 2022-05-24 | Stop reason: HOSPADM

## 2022-05-23 RX ADMIN — ATORVASTATIN CALCIUM 80 MG: 40 TABLET, FILM COATED ORAL at 21:23

## 2022-05-23 RX ADMIN — ACETAMINOPHEN 650 MG: 325 TABLET ORAL at 21:32

## 2022-05-23 RX ADMIN — NIACIN 500 MG: 500 TABLET, EXTENDED RELEASE ORAL at 21:23

## 2022-05-23 RX ADMIN — INSULIN LISPRO 1 UNITS: 100 INJECTION, SOLUTION INTRAVENOUS; SUBCUTANEOUS at 21:23

## 2022-05-23 RX ADMIN — FAMOTIDINE 20 MG: 20 TABLET ORAL at 08:02

## 2022-05-23 RX ADMIN — GADOBENATE DIMEGLUMINE 15 ML: 529 INJECTION, SOLUTION INTRAVENOUS at 12:37

## 2022-05-23 RX ADMIN — INSULIN LISPRO 1 UNITS: 100 INJECTION, SOLUTION INTRAVENOUS; SUBCUTANEOUS at 08:02

## 2022-05-23 RX ADMIN — INSULIN LISPRO 1 UNITS: 100 INJECTION, SOLUTION INTRAVENOUS; SUBCUTANEOUS at 11:51

## 2022-05-23 RX ADMIN — ACETAMINOPHEN 650 MG: 325 TABLET ORAL at 15:58

## 2022-05-23 RX ADMIN — ALBUTEROL SULFATE 2.5 MG: 2.5 SOLUTION RESPIRATORY (INHALATION) at 19:06

## 2022-05-23 RX ADMIN — ALBUTEROL SULFATE 2.5 MG: 2.5 SOLUTION RESPIRATORY (INHALATION) at 14:22

## 2022-05-23 RX ADMIN — INSULIN GLARGINE 20 UNITS: 100 INJECTION, SOLUTION SUBCUTANEOUS at 21:24

## 2022-05-23 RX ADMIN — ASPIRIN 81 MG: 81 TABLET, COATED ORAL at 08:02

## 2022-05-23 RX ADMIN — ACETAMINOPHEN 650 MG: 325 TABLET ORAL at 09:04

## 2022-05-23 RX ADMIN — ALBUTEROL SULFATE 2.5 MG: 2.5 SOLUTION RESPIRATORY (INHALATION) at 10:56

## 2022-05-23 RX ADMIN — ENOXAPARIN SODIUM 40 MG: 100 INJECTION SUBCUTANEOUS at 08:02

## 2022-05-23 RX ADMIN — Medication 5 MG: at 21:23

## 2022-05-23 RX ADMIN — ALBUTEROL SULFATE 2.5 MG: 2.5 SOLUTION RESPIRATORY (INHALATION) at 06:23

## 2022-05-23 ASSESSMENT — PAIN DESCRIPTION - LOCATION: LOCATION: HEAD

## 2022-05-23 ASSESSMENT — PAIN SCALES - GENERAL: PAINLEVEL_OUTOF10: 3

## 2022-05-23 ASSESSMENT — PAIN DESCRIPTION - ORIENTATION: ORIENTATION: ANTERIOR

## 2022-05-23 ASSESSMENT — PAIN DESCRIPTION - DESCRIPTORS: DESCRIPTORS: ACHING

## 2022-05-23 ASSESSMENT — PAIN - FUNCTIONAL ASSESSMENT: PAIN_FUNCTIONAL_ASSESSMENT: ACTIVITIES ARE NOT PREVENTED

## 2022-05-23 NOTE — PROGRESS NOTES
[facility]    Patient:  Ritika Cartwright  YOB: 1950  Date of Service: 5/23/2022  MRN: [id number]   Acct: [de-identified]   Primary Care Physician: Rosina Cartwright MD  Advance Directive: Full Code  Admit Date: 5/22/2022       Hospital Day: 0      CHIEF COMPLAINT:     Chief Complaint   Patient presents with    Numbness     pt reports left sided numbness and weakness onset approx 1830 tonight while in yard; states left leg weakness, left arm weakness, and left side of face feels numb- denies slurred speech or facial droop at this time. 5/23/2022 12:12 PM  Subjective / Interval History:   05/23/2022  Patient seen and examined. Doing well. Continues to have upper extremity numbness and tingling. Denies any acute complaints or distress on exam.  No acute changes or acute overnight event reported. Review of Systems:   Review of Systems  ROS: 14 point review of systems is negative except as specifically addressed above. ADULT DIET; Regular;  No Added Salt (3-4 gm)    Intake/Output Summary (Last 24 hours) at 5/23/2022 1212  Last data filed at 5/23/2022 0303  Gross per 24 hour   Intake 350 ml   Output --   Net 350 ml       Medications:   dextrose       Current Facility-Administered Medications   Medication Dose Route Frequency Provider Last Rate Last Admin    albuterol (PROVENTIL) nebulizer solution 2.5 mg  2.5 mg Nebulization 4x daily ROLDAN Lepe CNP   2.5 mg at 05/23/22 1056    acetaminophen (TYLENOL) tablet 650 mg  650 mg Oral Q4H PRN Alee Juarez MD   650 mg at 05/23/22 0904    insulin glargine (LANTUS) injection vial 20 Units  20 Units SubCUTAneous Nightly Alee Juarez MD        insulin lispro (HUMALOG) injection vial 5 Units  5 Units SubCUTAneous TID WC Alee Juarez MD        ondansetron (ZOFRAN-ODT) disintegrating tablet 4 mg  4 mg Oral Q8H PRN ROLDAN Lepe CNP        Or    ondansetron [facility]) injection 4 mg  4 mg IntraVENous Q6H PRN Augustine Arshad, APRN - CNP        polyethylene glycol (GLYCOLAX) packet 17 g  17 g Oral Daily PRN Augustine Arshad, APRN - CNP        enoxaparin (LOVENOX) injection 40 mg  40 mg SubCUTAneous Daily Gene Jeancarlos, APRN - CNP   40 mg at 05/23/22 0802    aspirin EC tablet 81 mg  81 mg Oral Daily Gene Jeancarlos, APRN - CNP   81 mg at 05/23/22 5455    Or    aspirin suppository 300 mg  300 mg Rectal Daily Gene Jeancarlos, APRN - CNP        perflutren lipid microspheres (DEFINITY) injection 1.65 mg  1.5 mL IntraVENous ONCE PRN Augustine Arshad, APRN - CNP        atorvastatin (LIPITOR) tablet 80 mg  80 mg Oral Nightly Augustine Arshad, APRN - CNP   80 mg at 05/22/22 2327    famotidine (PEPCID) tablet 20 mg  20 mg Oral Daily Gene Jeancarlos, APRN - CNP   20 mg at 05/23/22 0802    melatonin disintegrating tablet 5 mg  5 mg Oral Nightly Augustine Arshad, APRN - CNP   5 mg at 05/22/22 2327    niacin (NIASPAN) extended release tablet 500 mg  500 mg Oral Nightly Gene Jeancarlos, APRN - CNP        insulin lispro (HUMALOG) injection vial 0-6 Units  0-6 Units SubCUTAneous TID  Augustine Arshad, APRN - CNP   1 Units at 05/23/22 1151    insulin lispro (HUMALOG) injection vial 0-3 Units  0-3 Units SubCUTAneous Nightly Gene Jeancarlos, APRN - CNP        glucose chewable tablet 16 g  4 tablet Oral PRN Augustine Arshad, APRN - CNP        dextrose bolus 10% 125 mL  125 mL IntraVENous PRN Augustine Arshad, APRN - CNP        Or    dextrose bolus 10% 250 mL  250 mL IntraVENous PRN Augustine Arshad, APRN - CNP        glucagon (rDNA) injection 1 mg  1 mg IntraMUSCular PRN Gene Jeancarlos, APRN - CNP        dextrose 5 % solution  100 mL/hr IntraVENous PRN Augustine Arshad, APRN - CNP             dextrose        albuterol  2.5 mg Nebulization 4x daily    insulin glargine  20 Units SubCUTAneous Nightly    insulin lispro  5 Units SubCUTAneous TID     enoxaparin  40 mg SubCUTAneous Daily    aspirin  81 mg Oral Daily    Or    aspirin  300 mg Rectal Daily    atorvastatin  80 mg Oral Nightly    famotidine  20 mg Oral Daily    melatonin  5 mg Oral Nightly    niacin  500 mg Oral Nightly    insulin lispro  0-6 Units SubCUTAneous TID WC    insulin lispro  0-3 Units SubCUTAneous Nightly     acetaminophen, ondansetron **OR** ondansetron, polyethylene glycol, perflutren lipid microspheres, glucose, dextrose bolus **OR** dextrose bolus, glucagon (rDNA), dextrose  ADULT DIET; Regular; No Added Salt (3-4 gm)       Labs:   CBC with DIFF:  Recent Labs     05/22/22 1940 05/23/22 0343   WBC 9.2 9.8   RBC 4.56 4.38   HGB 13.4 13.1   HCT 42.5 40.1   MCV 93.2 91.6   MCH 29.4 29.9   MCHC 31.5* 32.7*   RDW 12.6 12.5    256   MPV 10.7 10.6   NEUTOPHILPCT 46.6*  --    LYMPHOPCT 43.8*  --    MONOPCT 7.8  --    EOSRELPCT 0.8  --    BASOPCT 0.8  --    NEUTROABS 4.3  --    LYMPHSABS 4.1  --    MONOSABS 0.70  --    EOSABS 0.10  --    BASOSABS 0.10  --        CMP/BMP:  Recent Labs     05/22/22 1940 05/23/22 0343    139   K 3.6 4.1    105   CO2 23 24   ANIONGAP 14 10   GLUCOSE 192* 128*   BUN 13 9   CREATININE 0.7 0.5   LABGLOM >60 >60   CALCIUM 9.2 9.1   PROT 6.9  --    LABALBU 4.0  --    BILITOT 0.3  --    ALKPHOS 67  --    ALT 19  --    AST 25  --          CRP:  No results for input(s): CRP in the last 72 hours. Sed Rate:  No results for input(s): SEDRATE in the last 72 hours. HgBA1c:  No components found for: HGBA1C  FLP:    Lab Results   Component Value Date    TRIG 177 05/23/2022    HDL 43 05/23/2022    LDLCALC 112 05/23/2022     TSH:  No results found for: TSH  Troponin T:   Recent Labs     05/22/22 1940   TROPONINI <0.01     Pro-BNP: No results for input(s): BNP in the last 72 hours.   INR:   Recent Labs     05/22/22 1940   INR 0.95     ABGs:   Lab Results   Component Value Date    PHART 7.440 05/09/2021    PO2ART 49.0 05/09/2021    FYF1BML 35.0 05/09/2021     UA:No results for input(s): NITRITE, COLORU, PHUR, LABCAST, Jessica Lento, MUCUS, TRICHOMONAS, YEAST, BACTERIA, CLARITYU, SPECGRAV, LEUKOCYTESUR, UROBILINOGEN, BILIRUBINUR, BLOODU, GLUCOSEU, AMORPHOUS in the last 72 hours. Invalid input(s): Oliver Brine      Culture Results:    No results for input(s): CXSURG in the last 720 hours. Blood Culture Recent: No results for input(s): BC in the last 720 hours. No results for input(s): BC, BLOODCULT2, ORG in the last 72 hours. Cultures:   No results for input(s): CULTURE in the last 72 hours. No results for input(s): BC, Dago Milvia in the last 72 hours. No results for input(s): CXSURG in the last 72 hours. No results for input(s): MG, PHOS in the last 72 hours. Recent Labs     05/22/22 1940   AST 25   ALT 19   BILITOT 0.3   ALKPHOS 67         RAD:   CT HEAD WO CONTRAST    Result Date: 5/22/2022  CT BRAIN without contrast dated 5/22/2022 7:54 PM HISTORY: Extremity weakness and facial numbness. Stroke symptoms. COMPARISON: None DOSE LENGTH PRODUCT: 757 mGy cm. All CT scans are performed using dose optimization techniques as appropriate to the performed exam and include at least one of the following: Automated exposure control, adjustment of the mA and/or kV according to size, and the use of the iterative reconstruction technique. TECHNIQUE: Serial axial tomographic images of the brain were obtained without the use of intravenous contrast. FINDINGS: The midline structures are nondisplaced. The ventricles and basilar cisterns are normal in size and configuration. There is no evidence of intracranial hemorrhage or mass-effect. The gray-white matter differentiation is maintained. The sulci have a normal configuration, and there are no abnormal extra-axial fluid collections. The structures of the posterior fossa are unremarkable. The included orbits and their contents are unremarkable. The visualized paranasal sinuses, mastoid air cells and middle ear cavities are clear.  The visualized osseous structures and overlying soft tissues of the skull and face are unremarkable. 1. No acute intracranial process. The code stroke report was phoned to Dr. Arvilla Krabbe in the emergency room at 8:02 PM. Signed by Dr Gayatri Palomino    Result Date: 5/22/2022  EXAMINATION: Chest one view 5/22/2022 HISTORY: Stroke symptoms FINDINGS: Today's exam is compared to previous study of 5/9/2021. There is elevation right hemidiaphragm with right basilar atelectasis. Lungs are otherwise clear. There is no effusion or free air present. 1.. Elevated right diaphragm with right basilar atelectasis. Lungs are otherwise clear. Signed by Dr Rolan Howe    Result Date: 5/22/2022  EXAMINATION: CT angiogram of the head and neck with contrast 5/22/2022 HISTORY: Stroke symptoms Dose: 605 mGycm. All CT scans are performed using dose optimization techniques as appropriate to the performed exam and include at least one of the following: Automated exposure control, adjustment of the mA and/or kV according to size, and the use of the iterative reconstruction technique. [facility] FINDINGS: Multiple contiguous axial images are obtained from the aortic arch to the vertex following intravenous contrast infusion with reformatted images obtained in the sagittal and coronal projections from the original data set as well as MIPS. There is some enlargement of the right lobe of the thyroid gland with a heterogeneous 1.7 cm nodule. If not previously documented follow-up with thyroid ultrasound could BE obtained for further evaluation. Level of the true and false cords is unremarkable. There is no evidence of adenopathy in the supraclavicular fossa. No pathologically enlarged cervical chain or posterior triangle lymphadenopathy is present. The thoracic aorta is mildly ectatic. The origin of the great vessels are unremarkable with no focal stenosis or plaquing. The proximal great vessels are tortuous.  The origin of both vertebral arteries are widely patent. Both vertebral arteries are widely patent from their origin to the basilar artery. The right common carotid artery is widely patent. There is mild calcific plaquing involving the right carotid bulb with no associated cross-sectional stenosis. The right ICA is widely patent from the carotid bifurcation to the skull base. The left common carotid artery is tortuous but widely patent. The left carotid bifurcation and extracranial ICA are widely patent with no focal stenosis or plaquing. Both distal vertebral arteries are widely patent. The posterior inferior cerebellar arteries are normal in appearance. The basilar artery, superior cerebellar arteries and left posterior cerebral artery are normal in appearance with no focal stenosis or plaquing. There is a persistent fetal origin of the right posterior cerebral artery which is widely patent. The petrous, cavernous and supraclinoid segment of both ICAs demonstrates minimal plaquing involving the cavernous and supraclinoid segment of both ICAs without associated stenosis. The anterior and middle cerebral arteries are normal in caliber with no evidence of focal steno-occlusive disease or discrete berry aneurysm. No evidence of intraluminal thrombus. There is normal enhancement of the dural venous sinuses with no evidence of dural venous sinus thrombosis. 1.. The aortic arch is normal in caliber without evidence of dissection or aneurysm. The origin of the great vessels as well as vertebral artery origins are unremarkable. 2. Both vertebral arteries are widely patent from their origin to the basilar artery. 3. The proximal common carotid arteries are tortuous. There is mild calcific plaquing involving the right carotid bulb with but with no associated stenosis. Left carotid bifurcation is unremarkable. The extracranial aspect of both ICAs are widely patent. 4. Persistent fetal origin of the right posterior cerebral artery.  The posterior circulation is otherwise unremarkable. 5. Mild calcific plaquing involving the cavernous and supraclinoid segment of both ICAs with no associated stenosis. Both ICAs are patent to the terminus. The anterior and middle cerebral arteries are normal in caliber with no evidence of focal steno-occlusive disease or discrete berry aneurysm. 6. Heterogeneous nodule involving the right lobe of the thyroid. This would warrant follow-up with ultrasound if not previously documented.  Signed by Dr Nabor Scanlon      Echo:  05/22/2022  Summary   Normal left ventricular size and systolic function EF 96%   Mild concentric left ventricular hypertrophy with mild [grade 1] diastolic   dysfunction   Normal left atrial size   Tricuspid aortic valve with adequate cusp separation neither stenosis or   insufficiency   Normally mobile mitral valve with no demonstrable regurgitation   No evidence of pulmonic valvular stenosis or insufficiency   Right-sided chambers are normal size with preserved RV systolic function   No tricuspid regurgitation with which to estimate RVSP   IVC dimension and inspiratory motion are normal consistent with normal   right atrial filling pressures   Aortic root dimensions fall within normal limits with mild dilatation of   the ascending aorta measuring 3.2 cm   Physiologic amount of anterior pericardial fluid   Injection of agitated saline with and without Valsalva maneuver   demonstrates no intracardiac communication   Survey of the aortic arch reveals no abnormality      Signature      ----------------------------------------------------------------   Electronically signed by Allison Ashby MD(Interpreting physician)   on 05/23/2022 10:16 AM   ----------------------------------------------------------------      Objective:   Vitals:   BP (!) 157/75   Pulse 107   Temp 98.8 °F (37.1 °C) (Temporal)   Resp 18   Ht 5' 2\" (1.575 m)   Wt 174 lb (78.9 kg)   SpO2 94%   BMI 31.83 kg/m²       Patient Vitals for the past 24 hrs:   BP Temp Temp src Pulse Resp SpO2 Height Weight   05/23/22 0739 (!) 157/75 98.8 °F (37.1 °C) Temporal 107 18 94 % -- --   05/23/22 0507 116/60 97.3 °F (36.3 °C) Temporal 86 16 96 % -- --   05/22/22 2332 -- -- -- 88 -- -- -- --   05/22/22 2304 (!) 152/72 97.2 °F (36.2 °C) Temporal 86 16 94 % -- --   05/22/22 2230 (!) 145/73 98 °F (36.7 °C) -- 83 18 95 % -- --   05/22/22 2130 (!) 149/76 -- -- 93 19 94 % -- --   05/22/22 2000 116/72 -- -- 94 22 96 % -- --   05/22/22 1943 116/72 -- -- 93 17 96 % -- --   05/22/22 1925 (!) 153/73 98.2 °F (36.8 °C) Temporal 104 17 95 % 5' 2\" (1.575 m) 174 lb (78.9 kg)       24HR INTAKE/OUTPUT:      Intake/Output Summary (Last 24 hours) at 5/23/2022 1212  Last data filed at 5/23/2022 0303  Gross per 24 hour   Intake 350 ml   Output --   Net 350 ml       Physical Exam  Vitals and nursing note reviewed. Constitutional:       General: She is not in acute distress. Appearance: Normal appearance. She is obese. She is not ill-appearing, toxic-appearing or diaphoretic. HENT:      Head: Normocephalic and atraumatic. Right Ear: External ear normal.      Left Ear: External ear normal.      Nose: Nose normal. No congestion or rhinorrhea. Mouth/Throat:      Mouth: Mucous membranes are moist.      Pharynx: Oropharynx is clear. No oropharyngeal exudate or posterior oropharyngeal erythema. Eyes:      General: No scleral icterus. Right eye: No discharge. Left eye: No discharge. Extraocular Movements: Extraocular movements intact. Conjunctiva/sclera: Conjunctivae normal.      Pupils: Pupils are equal, round, and reactive to light. Cardiovascular:      Rate and Rhythm: Normal rate and regular rhythm. Pulses: Normal pulses. Heart sounds: Normal heart sounds. No murmur heard. No friction rub. No gallop. Pulmonary:      Effort: Pulmonary effort is normal. No respiratory distress. Breath sounds: Normal breath sounds. No stridor.  No wheezing, rhonchi or rales. Chest:      Chest wall: No tenderness. Abdominal:      General: Bowel sounds are normal. There is no distension. Palpations: Abdomen is soft. Tenderness: There is no abdominal tenderness. There is no guarding or rebound. Musculoskeletal:         General: No swelling, tenderness, deformity or signs of injury. Normal range of motion. Cervical back: Normal range of motion and neck supple. No rigidity. No muscular tenderness. Right lower leg: No edema. Left lower leg: No edema. Skin:     General: Skin is warm and dry. Capillary Refill: Capillary refill takes less than 2 seconds. Coloration: Skin is not jaundiced or pale. Findings: No bruising, erythema, lesion or rash. Neurological:      General: No focal deficit present. Mental Status: She is alert and oriented to person, place, and time. Cranial Nerves: No cranial nerve deficit. Sensory: No sensory deficit. Motor: No weakness. Coordination: Coordination normal.   Psychiatric:         Mood and Affect: Mood normal.         Behavior: Behavior normal.         Thought Content: Thought content normal.         Judgment: Judgment normal.           Assessment/plan:         Hospital Problems           Last Modified POA    * (Principal) Stroke-like symptoms 5/22/2022 Yes    Thyroid nodule 5/22/2022 Yes    Essential hypertension 5/23/2022 Yes    Type 2 diabetes mellitus ([address] 5/22/2022 Yes          Principal Problem:    Stroke-like symptoms  Active Problems:    Thyroid nodule    Essential hypertension    Type 2 diabetes mellitus ([address]  Resolved Problems:    * No resolved hospital problems. *        Brief Summary  Ms Ovi Seals, a 74 y.o. female who presented [facility] ER with PMH HTN, type II DM, complaining of left-sided weakness. Patient states that she was working out in her yard today picking up sticks when she acutely developed weakness in her left leg and numbness to her left hand.   In addition she noted numbness and tingling to the left corner of her mouth. CT head no acute intracranial process,   CTA head neck vertebral arteries are widely patent, both ICA patent,   CXR right basilar atelectasis,     Patient admitted for further workup and management, including; with consultation to neurology. Left facial, upper extremity numbness and unsteadiness. ? TIA  · Antiplatelet Therapy  · Statin  · TTE, with agitated saline bubble study (05/22/2022): Normal LV size and systolic function. Estimated EF of 60%. Mild grade 1 diastolic dysfunction. Injection of agitated saline with and without Valsalva maneuver  ·  demonstrates no intracardiac communication  · MRI Brain W / WO Con   PT/OT & SLP   Neuro check Q4 hours. [facility] on board    Diabetes Mellitus II   Inpatient Regimens to include;  o - Insulin Glargine (Lantus) 20 units subcu nightly  o - Insulin Lispro (Humalog) 5 units subcu pre-meal 3 times a day  o - Insulin Lispro (Humalog) on a Low dose sliding scale   Monitor POC glucose, and adjust insulin regimen accordingly based on daily insulin requirement. Thyroid nodule  · Heterogenous nodule involving right lobe of the thyroid        · Follow-up with PCP for monitoring, has been seen on prior    imaging  2012        Continue management of other chronic medical conditions - see above and orders. Advance Directive: Full Code    ADULT DIET; Regular; No Added Salt (3-4 gm)         Consults Made:   IP CONSULT TO PHARMACY  PHARMACY TO CHANGE BASE FLUIDS  IP CONSULT TO NEUROLOGY    DVT prophylaxis: Enoxaparin    Discharge planning: tbd    Time Spent is 35 mins in the examination, evaluation, counseling and review of medications, assessment and plan.      Electronically signed by   Orlando Fuller MD, MPH, MD,   Internal Medicine Hospitalist   5/23/2022 12:12 PM

## 2022-05-23 NOTE — PROGRESS NOTES
Speech Language Pathology  Facility/Department: [facility] SURG SERVICES  Initial Speech/Language/Cognitive/Swallow Assessment    NAME: Balaji Mckeon  : 1950   MRN: [id number]  ADMISSION DATE: 2022  ADMITTING DIAGNOSIS: has Pneumonia due to COVID-19 virus; Essential hypertension; Type 2 diabetes mellitus ([facility] [address] Sepsis ([facility] [address] Hyponatremia; Hypocalcemia; Hypokalemia; Acute respiratory failure with hypoxia ([facility] [address] Palliative care patient; Bradycardia, drug induced; Stroke-like symptoms; and Thyroid nodule on their problem list.    Date of Eval: 2022   Evaluating Therapist: KARMA Self    Assessment:  Completed assessment. Patient exhibited slow processing with delayed, but appropriate, auditory comprehension and verbalizations noted. Also evaluated patient's swallowing function. Patient exhibited decreased oral prep of more solid consistencies as well as sluggish, mildly decreased laryngeal elevation for swallow airway protection. Even so, no outward S/S penetration/aspiration was observed with any regular solid consistency trial or thin H2O trial presented during assessment this date. At this time, would recommend continuation regular solid consistency with thin liquids. Self-feed. Thank you for this consult. Goals:  Short-term Goals  Timeframe for Short-term Goals: 1-2x/day for 3 days  Goal 1: Patient will tolerate regular solid consistency and thin liquids with min S/S penetration/aspiration during PO intake. Goal 2: Patient staff will follow swallow safety recommendations to decrease risk of penetration/aspiration during PO intake. Goal 3: Monitor ygmzdt-feyqtnyn-lqojtkoad functioning. Subjective:  Chart Reviewed: Yes  Patient assessed for rehabilitation services?: Yes  Family / Caregiver Present: No     Objective:  Oral Motor:   Labial ROM: Adequate during labial retraction trials and labial protrusion trials.   Labial Strength: Adequate during labial compression trials. Labial Coordination: Adequate movements were noted. Lingual ROM: Adequate during lingual extension trials with full point achieved; adequate during lingual elevation trials without use of accessory jaw movement; adequate movements noted bilaterally. Lingual Strength: Adequate  Lingual Coordination: Adequate movements were noted. Auditory Comprehension  Comprehension:  (While delayed, patient demonstrated ability to answer simple yes/no questions regarding immediate environment and current state of being at independent level. While delayed, patient demonstrated ability to follow simple 1 step commands independently. While delayed, patient demonstrated ability to answer yes/no questions of increased complexity and to follow complex 1 and simple 2 step commands at independent level.)     Expression  Primary Mode of Expression:  (Confrontation naming of items in room was considered to be [facility]. Structured responsive speech and responses in natural conversation were considered to be mildly delayed but appropriate.)     Motor Speech:  (SLP ranked functional intelligibility of speech for unfamiliar listeners at 100% in utterances with background noise present.)     Overall Orientation Status:  (Patient demonstrated ability to verbalize name, birthday, age, address, phone number, city, state, hospital, month, ZEV, and year at independent level.)     Attention:  (Patient demonstrated appropriate select and sustained attention during assessment.)     Memory:  (Patient demonstrated appropriate immediate memory with sequences of unrelated numbers/words set up to 5 items without repetitions provided. Patient demonstrated appropriate short-term memory with 10 minute delay+distractions present.)     Problem Solving:  (It is noted that during evaluation, patient demonstrated ability to verbalize current PCP and pharmacy at independent level.  Patient demonstrated ability to verbalize conditions in which she takes medications independently. Patient demonstrated ability to verbalize appropriate simple solutions to situations that could occur during activities of daily living at independent level.)    Additional Assessment:   Assessed patient's swallowing function. With regular solid consistency trials presented independently, patient exhibited decreased rotary jaw movement during oral prep. Oral transit of regular solid consistency primarily measured 1-2 seconds in length and min oral cavity residue was noted post swallows; residue cleared from the mouth with additional dry swallows. Oral transit of thin H2O trials, presented independently via straw, primarily measured 1-2 seconds in length. Laryngeal elevation during swallow initiation was considered to be sluggish and mildly decreased for swallow airway protection. Even so, no outward S/S penetration/aspiration was observed with any regular solid consistency trial or thin H2O trial presented during evaluation this date. At this time, would recommend continuation regular solid consistency with thin liquids. Self-feed. Will continue to follow.      Electronically signed by KARMA Bustamante on 5/23/2022 at 12:19 PM

## 2022-05-23 NOTE — ED PROVIDER NOTES
Diagnosis Date    Diabetes mellitus ([facility] [address]     Hypertension     Palliative care patient 05/10/2021         SURGICAL HISTORY       Past Surgical History:   Procedure Laterality Date    CATARACT REMOVAL      HYSTERECTOMY      LITHOTRIPSY           CURRENT MEDICATIONS     Previous Medications    ALBUTEROL SULFATE  (90 BASE) MCG/ACT INHALER    Inhale 2 puffs into the lungs 4 times daily    ASPIRIN 81 MG EC TABLET    Take 81 mg by mouth daily    AZELASTINE (ASTELIN) 0.1 % NASAL SPRAY    azelastine 137 mcg (0.1 %) nasal spray aerosol    BISOPROLOL-HYDROCHLOROTHIAZIDE (ZIAC) 5-6.25 MG PER TABLET    bisoprolol 5 mg-hydrochlorothiazide 6.25 mg tablet   TAKE 1 TABLET BY MOUTH ONCE DAILY. CALCIUM CARBONATE-VITAMIN D (CALCIUM 500/D) 500-125 MG-UNIT TABS    calcium carbonate 600 mg (1,500 mg)-vitamin D3 2,500 unit capsule   one daily    FAMOTIDINE (PEPCID) 20 MG TABLET    Pepcid 20 mg tablet   Take 1 tablet every day by oral route as needed. FEXOFENADINE (ALLEGRA ALLERGY) 180 MG TABLET    Allegra Allergy 180 mg tablet   Take 1 tablet every day by oral route as needed. MELATONIN 5 MG TBDP DISINTEGRATING TABLET    Take 1 tablet by mouth nightly    NIACIN 500 MG TABLET    Take 500 mg by mouth nightly    ONDANSETRON (ZOFRAN) 4 MG TABLET    Zofran 4 mg tablet   Take 1 tablet every 8 hours by oral route as needed. as needed for nausea    VITAMIN D (CHOLECALCIFEROL) 125 MCG (5000 UT) CAPS CAPSULE    cholecalciferol (vitamin D3) 125 mcg (5,000 unit) capsule   one daily       ALLERGIES     Alendronate, Clarithromycin, and Codeine    FAMILY HISTORY     History reviewed. No pertinent family history.        SOCIAL HISTORY       Social History     Socioeconomic History    Marital status:      Spouse name: None    Number of children: None    Years of education: None    Highest education level: None   Occupational History    None   Tobacco Use    Smoking status: Never Smoker    Smokeless tobacco: Never Used   Substance and Sexual Activity    Alcohol use: Never    Drug use: Never    Sexual activity: Never   Other Topics Concern    None   Social History Narrative    None     Social Determinants of Health     Financial Resource Strain:     Difficulty of Paying Living Expenses: Not on file   Food Insecurity:     Worried About Running Out of Food in the Last Year: Not on file    Avery of Food in the Last Year: Not on file   Transportation Needs:     Lack of Transportation (Medical): Not on file    Lack of Transportation (Non-Medical): Not on file   Physical Activity:     Days of Exercise per Week: Not on file    Minutes of Exercise per Session: Not on file   Stress:     Feeling of Stress : Not on file   Social Connections:     Frequency of Communication with Friends and Family: Not on file    Frequency of Social Gatherings with Friends and Family: Not on file    Attends Church Services: Not on file    Active Member of [address] [place] [address] [employer] or Organizations: Not on file    Attends Club or Organization Meetings: Not on file    Marital Status: Not on file   Intimate Partner Violence:     Fear of Current or Ex-Partner: Not on file    Emotionally Abused: Not on file    Physically Abused: Not on file    Sexually Abused: Not on file   Housing Stability:     Unable to Pay for Housing in the Last Year: Not on file    Number of [place] in the Last Year: Not on file    Unstable Housing in the Last Year: Not on file       SCREENINGS   NIH Stroke Scale  Interval: Baseline  Level of Consciousness (1a): Alert  LOC Questions (1b): Answers both correctly  LOC Commands (1c): Performs both tasks correctly  Best Gaze (2): Normal  Visual (3): No visual loss  Facial Palsy (4): Normal symmetrical movement  Motor Arm, Left (5a): Drift, but does not hit bed  Motor Arm, Right (5b): No drift  Motor Leg, Left (6a): No drift  Motor Leg, Right (6b):  No drift  Limb Ataxia (7): Absent  Sensory (8): (!) Mild to Moderate  Best Language (9): No aphasia  Dysarthria (10): Normal  Extinction and Inattention (11): No abnormality  Total: 2Glasgow Coma Scale  Eye Opening: Spontaneous  Best Verbal Response: Oriented  Best Motor Response: Obeys commands  Xiang Coma Scale Score: 15        PHYSICAL EXAM    (up to 7 for level 4, 8 or more for level 5)     ED Triage Vitals [05/22/22 1925]   BP Temp Temp Source Pulse Resp SpO2 Height Weight   (!) 153/73 98.2 °F (36.8 °C) Temporal 104 17 95 % 5' 2\" (1.575 m) 174 lb (78.9 kg)       Physical Exam  Vitals and nursing note reviewed. Constitutional:       Appearance: She is well-developed. HENT:      Head: Normocephalic and atraumatic. Eyes:      Conjunctiva/sclera: Conjunctivae normal.      Pupils: Pupils are equal, round, and reactive to light. Neck:      Trachea: No tracheal deviation. Cardiovascular:      Rate and Rhythm: Normal rate and regular rhythm. Heart sounds: Normal heart sounds. Pulmonary:      Effort: Pulmonary effort is normal. No respiratory distress. Breath sounds: Normal breath sounds. No wheezing or rales. Abdominal:      General: Bowel sounds are normal.      Palpations: Abdomen is soft. Musculoskeletal:         General: Normal range of motion. Cervical back: Normal range of motion and neck supple. Skin:     General: Skin is warm and dry. Neurological:      Mental Status: She is alert and oriented to person, place, and time. Psychiatric:         Behavior: Behavior normal.         Thought Content:  Thought content normal.         DIAGNOSTIC RESULTS     EKG: All EKG's areinterpreted by the Emergency Department Physician who either signs or Co-signs this chart in the absence of a cardiologist.    Normal sinus rhythm, heart rate 90, no ischemic change    RADIOLOGY:  Non-plain film images such as CT, Ultrasound and MRI are read by the radiologist. Plain radiographic images are visualized and preliminarily interpreted bythe emergency physician with the below findings:    See below      CTA HEAD NECK W CONTRAST   Final Result   1.. The aortic arch is normal in caliber without evidence of   dissection or aneurysm. The origin of the great vessels as well as   vertebral artery origins are unremarkable. 2. Both vertebral arteries are widely patent from their origin to the   basilar artery. 3. The proximal common carotid arteries are tortuous. There is mild   calcific plaquing involving the right carotid bulb with but with no   associated stenosis. Left carotid bifurcation is unremarkable. The   extracranial aspect of both ICAs are widely patent. 4. Persistent fetal origin of the right posterior cerebral artery. The   posterior circulation is otherwise unremarkable. 5. Mild calcific plaquing involving the cavernous and supraclinoid   segment of both ICAs with no associated stenosis. Both ICAs are patent   to the terminus. The anterior and middle cerebral arteries are normal   in caliber with no evidence of focal steno-occlusive disease or   discrete berry aneurysm. 6. Heterogeneous nodule involving the right lobe of the thyroid. This   would warrant follow-up with ultrasound if not previously documented. Signed by Dr Boone Caro   Final Result   1.. Elevated right diaphragm with right basilar atelectasis. Lungs are   otherwise clear. Signed by Dr Paulette Uriostegui   Final Result   1. No acute intracranial process.  The code stroke report was phoned to   Dr. Harrison Turner in the emergency room at 8:02 PM.   Signed by Dr Maria A Avila:  Zuri Mahmood - Abnormal; Notable for the following components:       Result Value    MCHC 31.5 (*)     Neutrophils % 46.6 (*)     Lymphocytes % 43.8 (*)     All other components within normal limits   COMPREHENSIVE METABOLIC PANEL W/ REFLEX TO MG FOR LOW K - Abnormal; Notable for the following components:    Glucose 192 (*)     All other components within normal limits   COVID-19, RAPID   TROPONIN   PROTIME-INR   POCT GLUCOSE       All other labs were within normal range or not returned as of this dictation. EMERGENCY DEPARTMENT COURSE and DIFFERENTIAL DIAGNOSIS/MDM:   Vitals:    Vitals:    05/22/22 1925 05/22/22 1943   BP: (!) 153/73 116/72   Pulse: 104 93   Resp: 17 17   Temp: 98.2 °F (36.8 °C)    TempSrc: Temporal    SpO2: 95% 96%   Weight: 174 lb (78.9 kg)    Height: 5' 2\" (1.575 m)        MDM  Number of Diagnoses or Management Options     Amount and/or Complexity of Data Reviewed  Clinical lab tests: ordered and reviewed  Tests in the radiology section of CPT®: ordered and reviewed  Discuss the patient with other providers: yes    Risk of Complications, Morbidity, and/or Mortality  Presenting problems: moderate  Diagnostic procedures: moderate  Management options: moderate    Patient Progress  Patient progress: stable      Reassessment  Patient markedly improved during ED stay. Symptoms are minimal, with NIH of 1. Case discussed with Dr. Linden Tran of neurology. Patient will be admitted to hospitalist with neuro consultation. Patient was given aspirin. CONSULTS:  IP CONSULT TO PHARMACY  PHARMACY TO CHANGE BASE FLUIDS  IP CONSULT TO NEUROLOGY    PROCEDURES:  Unless otherwise noted below, none     Procedures    FINAL IMPRESSION      1. TIA (transient ischemic attack)    2. Paresthesia    3. Numbness and tingling          DISPOSITION/PLAN   DISPOSITION Decision To Admit 05/22/2022 09:05:46 PM      PATIENT REFERRED TO:  No follow-up provider specified.     DISCHARGE MEDICATIONS:  New Prescriptions    No medications on file          (Please note that portions of this note were completed with a voice recognition program.  Efforts were made to edit thedictations but occasionally words are mis-transcribed.)    True Bridges MD (electronically signed)  Attending Emergency Physician         True Bridges MD  05/22/22 [id number]

## 2022-05-23 NOTE — CONSULTS
[facility] Neurology Consult      Patient:   Jackie Cohn  MR#:    [id number]  Account Number:                   [id number]      Room:    [facility] [place]   YOB: 1950  Date of Progress Note: 5/23/2022  Time of Note                           8:23 AM  Attending Physician:  Cheri Navarro MD  Consulting Physician:  Ca Juarez DO       CHIEF COMPLAINT:  Left facial, upper extremity numbness, unsteadiness. HISTORY OF PRESENT ILLNESS:   This is a 74 y.o. female who was admitted with left facial and upper extremity numbness and balance difficulty. Patient states that her symptoms started yesterday evening she noted left facial numbness along with some tingling paresthesias in possible numbness in the left upper extremity. She also noted difficulty with her balance and possible left lower extremity weakness. .  She denies facial drooping or slurred speech. She has no prior stroke history. No history of atrial fibrillation or carotid artery disease. She does note a mild frontal headache this morning. She denies visual changes. No fever no neck stiffness. Symptoms have improved significantly though noting some residual paresthesias in the left hand. REVIEW OF SYSTEMS:  Constitutional - No fever or chills. HENT -  No Scalp tenderness. No tinnitus or significant hearing loss. No nose bleeding, no sore throat. Eyes - No sudden vision change or eye pain  Respiratory - No significant shortness of breath or cough  Cardiovascular - No chest pain. No palpitations or significant leg swelling  Gastrointestinal - No abdominal swelling or pain. Genitourinary - No difficulty urinating, dysuria  Musculoskeletal - No back pain or myalgia. Skin - No color change or rash  Neurologic - No seizures. Hematologic - No easy bruising or spontaneous bleeding. Psychiatric - No anxiety.      PAST MEDICAL HISTORY:      Diagnosis Date    Diabetes mellitus ([facility] [address]     Hypertension     Palliative care patient 05/10/2021       PAST SURGICAL HISTORY:      Procedure Laterality Date    CATARACT REMOVAL      HYSTERECTOMY      LITHOTRIPSY         SOCIAL HISTORY:   TOBACCO:   reports that she has never smoked. She has never used smokeless tobacco.  ETOH:   reports no history of alcohol use. DRUG:    Social History     Substance and Sexual Activity   Drug Use Never       FAMILY HISTORY:   History reviewed. No pertinent family history.     MEDICATIONS:      Current Facility-Administered Medications:     albuterol (PROVENTIL) nebulizer solution 2.5 mg, 2.5 mg, Nebulization, 4x daily, ROLDAN Mcgee - CNP, 2.5 mg at 05/23/22 4730    ondansetron (ZOFRAN-ODT) disintegrating tablet 4 mg, 4 mg, Oral, Q8H PRN **OR** ondansetron (ZOFRAN) injection 4 mg, 4 mg, IntraVENous, Q6H PRN, ROLDAN Mcgee CNP    polyethylene glycol (GLYCOLAX) packet 17 g, 17 g, Oral, Daily PRN, ROLDAN Mcgee CNP    enoxaparin (LOVENOX) injection 40 mg, 40 mg, SubCUTAneous, Daily, ROLDAN Mcgee - CNP, 40 mg at 05/23/22 0802    aspirin EC tablet 81 mg, 81 mg, Oral, Daily, 81 mg at 05/23/22 0802 **OR** aspirin suppository 300 mg, 300 mg, Rectal, Daily, ROLDAN Mcgee CNP    perflutren lipid microspheres (DEFINITY) injection 1.65 mg, 1.5 mL, IntraVENous, ONCE PRN, ROLDAN Mcgee CNP    atorvastatin (LIPITOR) tablet 80 mg, 80 mg, Oral, Nightly, ROLDAN Mcgee - CNP, 80 mg at 05/22/22 2327    famotidine (PEPCID) tablet 20 mg, 20 mg, Oral, Daily, ROLDAN Mcgee - CNP, 20 mg at 05/23/22 0802    melatonin disintegrating tablet 5 mg, 5 mg, Oral, Nightly, ROLDAN Mcgee - CNP, 5 mg at 05/22/22 2327    niacin (NIASPAN) extended release tablet 500 mg, 500 mg, Oral, Nightly, ROLDAN Mcgee CNP    insulin lispro (HUMALOG) injection vial 0-6 Units, 0-6 Units, SubCUTAneous, TID JOSE, Kev Millard, APRN - CNP, 1 Units at 05/23/22 0802    insulin lispro (HUMALOG) injection vial 0-3 Units, 0-3 Units, SubCUTAneous, Nightly, Radha Phillips APRN - CNP    glucose chewable tablet 16 g, 4 tablet, Oral, PRN, Radha Phillips, APRN - CNP    dextrose bolus 10% 125 mL, 125 mL, IntraVENous, PRN **OR** dextrose bolus 10% 250 mL, 250 mL, IntraVENous, PRN, Radha Garcíaener, APRN - CNP    glucagon (rDNA) injection 1 mg, 1 mg, IntraMUSCular, PRN, Radha Phillips, APRN - CNP    dextrose 5 % solution, 100 mL/hr, IntraVENous, PRN, Radha [place], APRN - CNP    ALLERGIES:    Alendronate, Clarithromycin, and Codeine    PHYSICAL EXAM:    Constitutional -   BP (!) 157/75   Pulse 107   Temp 98.8 °F (37.1 °C) (Temporal)   Resp 18   Ht 5' 2\" (1.575 m)   Wt 174 lb (78.9 kg)   SpO2 94%   BMI 31.83 kg/m²   General appearance: No acute distress   EYES -   Conjunctiva normal  Pupillary exam as below, see CN exam in the neurologic exam  ENT-    No scars, masses, or lesions over external nose or ears  Oropharynx without erythema, palate midline  Cardiovascular -   No clubbing, cyanosis, or edema   Pulmonary-   Good expansion, normal effort without use of accessory muscles  Musculoskeletal -   No significant wasting of muscles noted  Gait as below, see gait exam in the neurologic exam  Muscle strength, tone, stability as below see the motor exam in the neurologic exam.   No bony deformities  Skin -   Warm, dry, and intact to inspection and palpation. No rash, erythema, or pallor  Psychiatric -   Mood, affect, and behavior appear normal    Memory as below see mental status examination in the neurologic exam      NEUROLOGICAL EXAM    Mental status   [x] Awake, alert, oriented   [x] Affect attention and concentration appear appropriate  [x] Recent and remote memory appears unremarkable  [x] Speech normal without dysarthria or aphasia, comprehension and repetition intact.    COMMENTS:   Cranial Nerves [x] No VF deficit to confrontation  [x] PERRLA, EOMI, no nystagmus, conjugate eye movements, no ptosis  [x] Face symmetric  [] Facial sensation intact  [x] Tongue midline no atrophy or fasciculations present  [x] Palate midline, hearing to finger rub normal  [x] Shoulder shrug and SCM testing normal  COMMENTS: Mild decreased LT left face    Motor   [x] 5/5 strength x 4 extremities  [x] Normal bulk and tone  [x] No tremor present  [x] No rigidity or bradykinesia noted  COMMENTS:   Sensory  [x] Sensation intact to light touch, pin prick, vibration, and proprioception BLE  [] Sensation intact to light touch, pin prick, vibration, and proprioception BUE  COMMENTS:   Coordination [x] FTN normal bilaterally   [] HTS normal bilaterally  [] ADELA normal.   COMMENTS:   Reflexes  [x] Symmetric and non-pathological  [x] Toes downgoing bilaterally  [x] No clonus present  COMMENTS:   Gait                  [] Normal steady gait    [] Ataxic    [] Spastic     [] Magnetic     [] Shuffling  [x] Not assessed  COMMENTS:       LABS/IMAGING:    As below and per HPI    CT HEAD WO CONTRAST    Result Date: 5/22/2022  CT BRAIN without contrast dated 5/22/2022 7:54 PM HISTORY: Extremity weakness and facial numbness. Stroke symptoms. COMPARISON: None DOSE LENGTH PRODUCT: 757 mGy cm. All CT scans are performed using dose optimization techniques as appropriate to the performed exam and include at least one of the following: Automated exposure control, adjustment of the mA and/or kV according to size, and the use of the iterative reconstruction technique. TECHNIQUE: Serial axial tomographic images of the brain were obtained without the use of intravenous contrast. FINDINGS: The midline structures are nondisplaced. The ventricles and basilar cisterns are normal in size and configuration. There is no evidence of intracranial hemorrhage or mass-effect. The gray-white matter differentiation is maintained. The sulci have a normal configuration, and there are no abnormal extra-axial fluid collections. The structures of the posterior fossa are unremarkable.  The included orbits and their contents are unremarkable. The visualized paranasal sinuses, mastoid air cells and middle ear cavities are clear. The visualized osseous structures and overlying soft tissues of the skull and face are unremarkable. 1. No acute intracranial process. The code stroke report was phoned to Dr. Frankie Mcmahon in the emergency room at 8:02 PM. Signed by Dr Varghese Hickman    Result Date: 5/22/2022  EXAMINATION: Chest one view 5/22/2022 HISTORY: Stroke symptoms FINDINGS: Today's exam is compared to previous study of 5/9/2021. There is elevation right hemidiaphragm with right basilar atelectasis. Lungs are otherwise clear. There is no effusion or free air present. 1.. Elevated right diaphragm with right basilar atelectasis. Lungs are otherwise clear. Signed by Dr Willy Pierre    Result Date: 5/22/2022  EXAMINATION: CT angiogram of the head and neck with contrast 5/22/2022 HISTORY: Stroke symptoms Dose: 605 mGycm. All CT scans are performed using dose optimization techniques as appropriate to the performed exam and include at least one of the following: Automated exposure control, adjustment of the mA and/or kV according to size, and the use of the iterative reconstruction technique. Heddy  FINDINGS: Multiple contiguous axial images are obtained from the aortic arch to the vertex following intravenous contrast infusion with reformatted images obtained in the sagittal and coronal projections from the original data set as well as MIPS. There is some enlargement of the right lobe of the thyroid gland with a heterogeneous 1.7 cm nodule. If not previously documented follow-up with thyroid ultrasound could BE obtained for further evaluation. Level of the true and false cords is unremarkable. There is no evidence of adenopathy in the supraclavicular fossa. No pathologically enlarged cervical chain or posterior triangle lymphadenopathy is present.  The thoracic aorta is mildly bifurcation is unremarkable. The extracranial aspect of both ICAs are widely patent. 4. Persistent fetal origin of the right posterior cerebral artery. The posterior circulation is otherwise unremarkable. 5. Mild calcific plaquing involving the cavernous and supraclinoid segment of both ICAs with no associated stenosis. Both ICAs are patent to the terminus. The anterior and middle cerebral arteries are normal in caliber with no evidence of focal steno-occlusive disease or discrete berry aneurysm. 6. Heterogeneous nodule involving the right lobe of the thyroid. This would warrant follow-up with ultrasound if not previously documented. Signed by Dr Alexys Vitale     05/22/22 1940 05/23/22 0343   WBC 9.2 9.8   HGB 13.4 13.1    256     Recent Labs     05/22/22 1940 05/23/22 0343    139   K 3.6 4.1    105   CO2 23 24   BUN 13 9   CREATININE 0.7 0.5   GLUCOSE 192* 128*     Recent Labs     05/22/22 1940   AST 25   ALT 19   BILITOT 0.3   ALKPHOS 67     Recent Labs     05/23/22  0343   CHOL 190   HDL 43*     Recent Labs     05/22/22 1940   INR 0.95         ASSESSMENT:  74 y.o. admitted with left facial and upper extremity numbness, unsteadiness. Suspect possible stroke / TIA. CT head, CTA head and neck negative. Was not felt and IV TPA candidate given rapidly resolving symptoms. PLAN:  1. MRI brain   2. ECHO, Tele  3. Antiplatelet therapy, statin, blood glucose maximization, permissive hypertension  4.  PT, OT, ST  5. DVT proph     Please feel free to call with any questions. [phone] (cell phone).     Michelle Morel DO  Board Certified Neurology

## 2022-05-23 NOTE — PROGRESS NOTES
Occupational Therapy Initial Assessment  Date: 2022   Patient Name: Jackie Cohn  MRN: [id number]     : 1950    Date of Service: 2022    Discharge Recommendations:  Continue to assess pending progress       Assessment   Assessment: OT evaluation completed and tx initiated. Pt may benefit from continued skilled services to address functional deficits. Pt will likely progress with further tx. Pt presents with some unsteadiness during ambulatory ADLs which could put her at risk for fall. OT does not anticipate any environmental barriers to D/C home once medically cleared if pt can receive initial supervision/assist to ensure safety in home environment. REQUIRES OT FOLLOW-UP: Yes  Activity Tolerance  Activity Tolerance: Patient Tolerated treatment well              Patient Diagnosis(es): The primary encounter diagnosis was TIA (transient ischemic attack). Diagnoses of Paresthesia and Numbness and tingling were also pertinent to this visit.              Restrictions  Restrictions/Precautions  Restrictions/Precautions: Fall Risk  Required Braces or Orthoses?: No    Subjective   General  Chart Reviewed: Yes  Patient assessed for rehabilitation services?: Yes  Additional Pertinent Hx: DM; HTN; cataract removal  Family / Caregiver Present: Yes  Diagnosis: stroke like symptoms  Pain Assessment  Pain Assessment: 0-10  Pain Level: 3  Patient's Stated Pain Goal: 0 - No pain  Pain Location: Head  Pain Orientation: Anterior  Pain Descriptors: Aching  Functional Pain Assessment: Activities are not prevented  Non-Pharmaceutical Pain Intervention(s): Rest  Pre Treatment Pain Screening  Pain at present: 0  Scale Used: Numeric Score    Social/Functional History  Social/Functional History  Lives With: Spouse  Home Layout: One level  Home Access: Stairs to enter with rails  Bathroom Equipment: Shower chair  Home Equipment: Rollator  ADL Assistance: Independent  Homemaking Assistance: Independent  Ambulation Assistance: Independent  Transfer Assistance: Independent       Objective   Pain Assessment  Pain Assessment: 0-10  Pain Level: 3  Patient's Stated Pain Goal: 0 - No pain  Pain Location: Head  Pain Orientation: Anterior  Pain Descriptors: Aching  Functional Pain Assessment: Activities are not prevented  Non-Pharmaceutical Pain Intervention(s): Rest  Hearing: Within functional limits          Toilet Transfers  Toilet - Technique: Ambulating  Equipment Used: Raised toilet seat with rails  Toilet Transfer: Contact guard assistance  ADL  Feeding: Independent;Setup  Grooming: Stand by assistance  Grooming Skilled Clinical Factors: standing  UE Bathing: Stand by assistance  UE Bathing Skilled Clinical Factors: seated  LE Bathing: Stand by assistance;Contact guard assistance  UE Dressing: Independent;Setup  LE Dressing: Setup;Contact guard assistance  Toileting: Stand by assistance;Setup        Bed mobility  Bed Mobility Comments: up in recliner  Transfers  Stand Step Transfers: Contact guard assistance  Sit to stand: Contact guard assistance  Stand to sit: Contact guard assistance     Cognition  Overall Cognitive Status: WFL               Gross Assessment  AROM: Within functional limits  Strength: Within functional limits  Coordination: Within functional limits  Sensation: Impaired (reports some improvement)  AROM: Within functional limits  Strength: Within functional limits  Coordination: Within functional limits  Sensation: Impaired (reports some improvement)                    Included Treatment  Tx consisted of: bed mobility; pt/family education; transfer training; activity tolerance/balance challenges; functional ambulation; positioning; distal LBD; and toileting skills. (Treatment time: 15 min)        Plan   Plan  Times per Week: 2-3    Goals  Short Term Goals  Time Frame for Short term goals: 1 week  Short Term Goal 1: Complete LBD and bathing with mod I.   Short Term Goal 2: Execute 15 min of ambulatory ADLs, including dynamic bending/reaching, with mod I. Short Term Goal 3: Pt/family will verbalize/demo: AE/DME options; recommended therapeutic activities; homemaking/IADL strategies; energy conservation techniques; and fall prevention strategies.                Luis Solis OTR/L  Electronically signed by Luis Solis OTR/L on 5/23/2022 at 10:27 AM.

## 2022-05-23 NOTE — PROGRESS NOTES
Physical Therapy  Facility/Department: [facility] SURG SERVICES  Physical Therapy Initial Assessment    Name: Scarlett Smith  : 1950  MRN: [id number]  Date of Service: 2022    Discharge Recommendations:  Home with assist PRN          Patient Diagnosis(es): The primary encounter diagnosis was TIA (transient ischemic attack). Diagnoses of Paresthesia and Numbness and tingling were also pertinent to this visit. Past Medical History:  has a past medical history of Diabetes mellitus ([address] Hypertension, and Palliative care patient. Past Surgical History:  has a past surgical history that includes Lithotripsy; Hysterectomy; and Cataract removal.    Assessment   Body Structures, Functions, Activity Limitations Requiring Skilled Therapeutic Intervention: Decreased functional mobility ; Decreased balance;Decreased sensation  Assessment: Pt would benefit from skilled PT in this setting to address her balance/gt deficits. anticipate pt being able to d/c home with husbands support.  depending on progress, may benefit from OP PT to assist in returning to plof with gt/mobility  Therapy Prognosis: Good  Decision Making: Low Complexity  Requires PT Follow-Up: Yes  Activity Tolerance  Activity Tolerance: Patient tolerated treatment well;Patient tolerated evaluation without incident     Plan   Plan  Plan: 3-5 times per week  Current Treatment Recommendations: Balance training,Functional mobility training,Endurance training,Gait training,Safety education & training,Therapeutic activities,Patient/Caregiver education & training  Safety Devices  Type of Devices: Call light within reach,Gait belt,Left in chair (pt verbalized use of call light for nsg supervision when getting out of chair)     Restrictions  Restrictions/Precautions  Restrictions/Precautions: Fall Risk  Required Braces or Orthoses?: No     Subjective   General  Patient assessed for rehabilitation services?: Yes  Diagnosis: L SIDE NUMBNESS AND WEAKNESS  Follows Commands: Within Functional Limits  Subjective  Subjective: pt STATES SHE CONTINUES TO HAVE L FACIAL NUMBNESS AND MILD ARM NUMBNESS. DENIES PAIN         Social/Functional History  Social/Functional History  Lives With: Spouse  Home Layout: One level  Home Access: Stairs to enter with rails  Bathroom Equipment: Shower chair  Home Equipment: Rollator  ADL Assistance: Independent  Homemaking Assistance: Independent  Ambulation Assistance: Independent  Transfer Assistance: Independent  Vision/Hearing  Hearing: Within functional limits    Cognition   Orientation  Overall Orientation Status: Within Functional Limits  Cognition  Overall Cognitive Status: WFL     Objective   Pulse: 107  Heart Rate Source: Monitor  BP: (!) 157/75  BP Location: Left upper arm  Patient Position: Sitting  MAP (Calculated): 102.33  Resp: 18  SpO2: 94 %  O2 Device: None (Room air)              AROM RLE (degrees)  RLE AROM: WFL  AROM LLE (degrees)  LLE AROM : WFL  Strength RLE  Strength RLE: WFL  Strength LLE  Strength LLE: WFL              Transfers  Sit to Stand: Stand by assistance  Stand to sit: Stand by assistance  Ambulation  Surface: level tile  Device: No Device  Assistance: Contact guard assistance  Quality of Gait: noted Pt to be slow and guarded with decreased floor clearance with L foot. no lob or path deviation  Gait Deviations: Slow Andra;Decreased step length;Decreased step height;Decreased arm swing  Distance: 125 ft  Comments: does not appear to require use of an AD  devise but is mildly unsteady and should amb with cga at this time     Balance  Comments: able to stand and each across mild line beyond arms length without lob. also performed side stepping and bw stepping without lob but was slow and guarded. Goals  Long Term Goals  Time Frame for Long term goals : amb 200 ft with sba       Education  Patient Education  Education Given To: Patient; Family  Education Provided: Role of Therapy;Plan of Care; Fall Prevention Strategies;Transfer Training  Education Method: Demonstration;Verbal  Barriers to Learning: None  Education Outcome: Verbalized understanding      Therapy Time   Individual Concurrent Group Co-treatment   Time In           Time Out           Minutes                   Hailee Barone PT     Electronically signed by Hailee Barone PT on 5/23/2022 at 10:38 AM

## 2022-05-23 NOTE — PROGRESS NOTES
STROKE ADMISSION    Date of Note: 5/23/2022  Time of Note: 2:17 PM    Patient Name: Jackie Garcia  MRN:   [id number]  YOB: 1950  Age:       74 y.o. Gender:      female    Room:   [address] [place] [address]   Adm. Date: 5/22/2022  Adm. Status:   Allergies:  Alendronate, Clarithromycin, and Codeine  Code Status: Full Code    Adm. Provider: No admitting provider for patient encounter. Neuro. Provider: Dr. Ketty Dunn    Bedside report and handoff assessment completed with Kyle Bethea RN. Presentation(s)    ED Chief Complaint  Chief Complaint   Patient presents with    Numbness     pt reports left sided numbness and weakness onset approx 1830 tonight while in yard; states left leg weakness, left arm weakness, and left side of face feels numb- denies slurred speech or facial droop at this time. ED Impression  1. TIA (transient ischemic attack)    2. Paresthesia    3. Numbness and tingling             Admission Impression  Principal Problem:    Stroke-like symptoms  Active Problems:    Thyroid nodule    Essential hypertension    Type 2 diabetes mellitus ([facility] [address]  Resolved Problems:    * No resolved hospital problems. *                  Last Known Well Date & Time    Last Known to be Well (Stroke Diagnosis)  Date Last Known Well: 05/22/22  Time Last Known Well: 1830    Current NIHSS:  NIH Stroke Scale  Interval: Reassessment  Level of Consciousness (1a): Alert  LOC Questions (1b): Answers both correctly  LOC Commands (1c): Performs both tasks correctly  Best Gaze (2): Normal  Visual (3): No visual loss  Facial Palsy (4): Normal symmetrical movement  Motor Arm, Left (5a): Drift, but does not hit bed  Motor Arm, Right (5b): No drift  Motor Leg, Left (6a): No drift  Motor Leg, Right (6b):  No drift  Limb Ataxia (7): Absent  Sensory (8): (!) Mild to Moderate  Best Language (9): No aphasia  Dysarthria (10): Normal  Extinction and Inattention (11): No abnormality  Total: 2    Current GCS:  [place] Coma Scale  Eye Opening: Spontaneous  Best Verbal Response: Oriented  Best Motor Response: Obeys commands  [place] Coma Scale Score: 15      Education & Care Plan    [x] Education Assessment Completed      Patient preferred method of education:   [x] Visual   [x] Written   [x] Verbal   [x] Demonstration   [x] All of the above    [x] Individualized Stroke/TIA Education template added, including patient specific risk factors:       Hypertension and Diabetes    [x] Stroke education initiated with patient and/or caregiver. [x] Stroke booklet given and reviewed completely and efficiently with patient and/or caregiver. All questions and concerns addressed. Will continue to educate appropriately.     [x] Individualized Stroke/TIA Care Plan added      [place] Swallow Screen (YSS)    [x] Bedside swallow screen completed using the YSS Protocol, and documented prior to any PO meds, food, or drink:     [x] Completed in ED    [x] Passed    [] Failed and awaiting SLP eval     [x] Completed upon admission to this unit    [x] Passed    [] Failed and awaiting SLP eval     [] Patient strict NPO status for procedure/testing      Swallow Screening  Is the patient unable to remain alert for testing?: No  Is the patient on a modified diet (thickened liquids) due to pre-existing dysphagia?: No  Is there presence of existing enteral tube feeding via the stomach or nose?: No  Is there presence of head-of-bed restrictions (less than 30 degrees)?: No  Is there presence of tracheotomy tube?: No  Is the patient ordered nothing-by-mouth status?: No  3 oz Water Swallow Screen: Pass      VTE Prophylaxis  (ASA, Plavix and tPA are not VTE prophylaxis)      SCDs   [x] On   [] Off   [] Refused   [] Contraindicated see MD orders      Medication(s)   [x] Lovenox   [] Eliquis   [] Heparin   [] Coumadin/Warfarin   [] Other:     [] Contraindicated see MD orders    [x] I attest as the admitting nurse that I have completed a thorough stroke assessment and admission on this patient. All checked assessment areas listed above have been   addressed and completed.     Nurse eSignature:  Gamal Eden RN  Date:   5/23/2022   Time:   2:17 PM

## 2022-05-23 NOTE — ED NOTES
Advised Dr. Rosey Mcclain patient has left side weakness which began at [address] [place] [address] Drive, RN  05/22/22 1932

## 2022-05-23 NOTE — H&P
[facility]      Hospitalist - History & Physical      PCP: Nereyda Uriarte MD    Date of Admission: 5/22/2022    Date of Service: 5/22/2022    Chief Complaint: Left-sided weakness    History Of Present Illness: The patient is a 74 y.o. female who presented to [facility] ER with PMH HTN, type II DM, complaining of left-sided weakness. Patient states that she was working out in her yard today picking up sticks when she acutely developed weakness in her left leg and numbness to her left hand. In addition she noted numbness and tingling to the left corner of her mouth. Denies headache, visual change, or recent fall. Denies recent illness, fever, chills, nausea, vomiting, abdominal pain, shortness of breath, and chest pain. Currently symptoms have resolved and patient is resting comfortably in bed in no acute distress. Work-up in ER CT head no acute intracranial process, CTA head neck vertebral arteries are widely patent, both ICA patent, CXR right basilar atelectasis, CMP WNL, troponin negative. Received  mg while in ER. Patient is to be admitted to the hospitalist service due to strokelike symptoms with consultation to neurology. Past Medical History:        Diagnosis Date    Diabetes mellitus ([facility] [address]     Hypertension     Palliative care patient 05/10/2021       Past Surgical History:        Procedure Laterality Date    CATARACT REMOVAL      HYSTERECTOMY      LITHOTRIPSY         Home Medications:  Prior to Admission medications    Medication Sig Start Date End Date Taking? Authorizing Provider   albuterol sulfate  (90 Base) MCG/ACT inhaler Inhale 2 puffs into the lungs 4 times daily 5/15/21   Dania Huffman MD   melatonin 5 MG TBDP disintegrating tablet Take 1 tablet by mouth nightly 5/15/21 6/14/21  Dania Huffman MD   fexofenadine TY [facility] ALLERGY) 180 MG tablet Allegra Allergy 180 mg tablet   Take 1 tablet every day by oral route as needed.     Historical Provider, MD   Calcium Carbonate-Vitamin D (CALCIUM 500/D) 500-125 MG-UNIT TABS calcium carbonate 600 mg (1,500 mg)-vitamin D3 2,500 unit capsule   one daily    Historical Provider, MD   aspirin 81 MG EC tablet Take 81 mg by mouth daily    Historical Provider, MD   azelastine (ASTELIN) 0.1 % nasal spray azelastine 137 mcg (0.1 %) nasal spray aerosol    Historical Provider, MD   bisoprolol-hydroCHLOROthiazide (ZIAC) 5-6.25 MG per tablet bisoprolol 5 mg-hydrochlorothiazide 6.25 mg tablet   TAKE 1 TABLET BY MOUTH ONCE DAILY. Historical Provider, MD   famotidine (PEPCID) 20 MG tablet Pepcid 20 mg tablet   Take 1 tablet every day by oral route as needed. Historical Provider, MD   vitamin D (CHOLECALCIFEROL) 125 MCG (5000 UT) CAPS capsule cholecalciferol (vitamin D3) 125 mcg (5,000 unit) capsule   one daily    Historical Provider, MD   niacin 500 MG tablet Take 500 mg by mouth nightly    Historical Provider, MD   ondansetron (ZOFRAN) 4 MG tablet Zofran 4 mg tablet   Take 1 tablet every 8 hours by oral route as needed. as needed for nausea    Historical Provider, MD       Allergies:    Alendronate, Clarithromycin, and Codeine    Social History:    The patient currently lives home with spouse  Tobacco:   reports that she has never smoked. She has never used smokeless tobacco.  Alcohol:   reports no history of alcohol use. Illicit Drugs: denies    Family History:  History reviewed. No pertinent family history. Review of Systems:   Review of Systems   Constitutional: Positive for fatigue. Respiratory: Negative. Cardiovascular: Negative. Gastrointestinal: Negative. Genitourinary: Negative. Musculoskeletal: Negative. Neurological: Positive for weakness and numbness. 14 point review of systems is negative except as specifically addressed above.     Physical Examination:  /72   Pulse 93   Temp 98.2 °F (36.8 °C) (Temporal)   Resp 17   Ht 5' 2\" (1.575 m)   Wt 174 lb (78.9 kg)   SpO2 96%   BMI 31.83 kg/m² Physical Exam  Constitutional:       General: She is not in acute distress. Appearance: Normal appearance. HENT:      Mouth/Throat:      Mouth: Mucous membranes are moist.      Pharynx: Oropharynx is clear. Eyes:      Extraocular Movements: Extraocular movements intact. Conjunctiva/sclera: Conjunctivae normal.      Pupils: Pupils are equal, round, and reactive to light. Cardiovascular:      Rate and Rhythm: Normal rate and regular rhythm. Pulses: Normal pulses. Heart sounds: Normal heart sounds. No murmur heard. Pulmonary:      Effort: Pulmonary effort is normal. No respiratory distress. Breath sounds: Normal breath sounds. Chest:      Chest wall: No tenderness. Abdominal:      General: Bowel sounds are normal. There is no distension. Palpations: Abdomen is soft. Tenderness: There is no abdominal tenderness. There is no guarding or rebound. Musculoskeletal:         General: No swelling. Normal range of motion. Cervical back: Normal range of motion and neck supple. No rigidity or tenderness. Right lower leg: No edema. Left lower leg: No edema. Skin:     General: Skin is warm and dry. Capillary Refill: Capillary refill takes less than 2 seconds. Neurological:      General: No focal deficit present. Mental Status: She is alert and oriented to person, place, and time. GCS: GCS eye subscore is 4. GCS verbal subscore is 5. GCS motor subscore is 6. Cranial Nerves: No cranial nerve deficit. Motor: No weakness.    Psychiatric:         Mood and Affect: Mood normal.         Behavior: Behavior normal.          Diagnostic Data:  CBC:  Recent Labs     05/22/22 1940   WBC 9.2   HGB 13.4   HCT 42.5        BMP:  Recent Labs     05/22/22 1940      K 3.6      CO2 23   BUN 13   CREATININE 0.7   CALCIUM 9.2     Recent Labs     05/22/22 1940   AST 25   ALT 19   BILITOT 0.3   ALKPHOS 79     Coag Panel:   Recent Labs 05/22/22 1940   INR 0.95   PROTIME 12.6     Cardiac Enzymes:   Recent Labs     05/22/22 1940   TROPONINI <0.01     ABGs:  Lab Results   Component Value Date    PHART 7.440 05/09/2021    PO2ART 49.0 05/09/2021    BTQ7XAI 35.0 05/09/2021     Urinalysis:  Lab Results   Component Value Date    NITRU Negative 05/09/2021    BLOODU Negative 05/09/2021    SPECGRAV 1.004 05/09/2021    GLUCOSEU Negative 05/09/2021       CT HEAD WO CONTRAST    Result Date: 5/22/2022  CT BRAIN without contrast dated 5/22/2022 7:54 PM HISTORY: Extremity weakness and facial numbness. Stroke symptoms. COMPARISON: None DOSE LENGTH PRODUCT: 757 mGy cm. All CT scans are performed using dose optimization techniques as appropriate to the performed exam and include at least one of the following: Automated exposure control, adjustment of the mA and/or kV according to size, and the use of the iterative reconstruction technique. TECHNIQUE: Serial axial tomographic images of the brain were obtained without the use of intravenous contrast. FINDINGS: The midline structures are nondisplaced. The ventricles and basilar cisterns are normal in size and configuration. There is no evidence of intracranial hemorrhage or mass-effect. The gray-white matter differentiation is maintained. The sulci have a normal configuration, and there are no abnormal extra-axial fluid collections. The structures of the posterior fossa are unremarkable. The included orbits and their contents are unremarkable. The visualized paranasal sinuses, mastoid air cells and middle ear cavities are clear. The visualized osseous structures and overlying soft tissues of the skull and face are unremarkable. 1. No acute intracranial process.  The code stroke report was phoned to Dr. Jb Ly in the emergency room at 8:02 PM. Signed by Dr Nick Zepeda    Result Date: 5/22/2022  EXAMINATION: Chest one view 5/22/2022 HISTORY: Stroke symptoms FINDINGS: Today's exam is compared to previous study of 5/9/2021. There is elevation right hemidiaphragm with right basilar atelectasis. Lungs are otherwise clear. There is no effusion or free air present. 1.. Elevated right diaphragm with right basilar atelectasis. Lungs are otherwise clear. Signed by Dr Art Ureña    Result Date: 5/22/2022  EXAMINATION: CT angiogram of the head and neck with contrast 5/22/2022 HISTORY: Stroke symptoms Dose: 605 mGycm. All CT scans are performed using dose optimization techniques as appropriate to the performed exam and include at least one of the following: Automated exposure control, adjustment of the mA and/or kV according to size, and the use of the iterative reconstruction technique. Fabian Hilt FINDINGS: Multiple contiguous axial images are obtained from the aortic arch to the vertex following intravenous contrast infusion with reformatted images obtained in the sagittal and coronal projections from the original data set as well as MIPS. There is some enlargement of the right lobe of the thyroid gland with a heterogeneous 1.7 cm nodule. If not previously documented follow-up with thyroid ultrasound could BE obtained for further evaluation. Level of the true and false cords is unremarkable. There is no evidence of adenopathy in the supraclavicular fossa. No pathologically enlarged cervical chain or posterior triangle lymphadenopathy is present. The thoracic aorta is mildly ectatic. The origin of the great vessels are unremarkable with no focal stenosis or plaquing. The proximal great vessels are tortuous. The origin of both vertebral arteries are widely patent. Both vertebral arteries are widely patent from their origin to the basilar artery. The right common carotid artery is widely patent. There is mild calcific plaquing involving the right carotid bulb with no associated cross-sectional stenosis. The right ICA is widely patent from the carotid bifurcation to the skull base.  The left common carotid artery is tortuous but widely patent. The left carotid bifurcation and extracranial ICA are widely patent with no focal stenosis or plaquing. Both distal vertebral arteries are widely patent. The posterior inferior cerebellar arteries are normal in appearance. The basilar artery, superior cerebellar arteries and left posterior cerebral artery are normal in appearance with no focal stenosis or plaquing. There is a persistent fetal origin of the right posterior cerebral artery which is widely patent. The petrous, cavernous and supraclinoid segment of both ICAs demonstrates minimal plaquing involving the cavernous and supraclinoid segment of both ICAs without associated stenosis. The anterior and middle cerebral arteries are normal in caliber with no evidence of focal steno-occlusive disease or discrete berry aneurysm. No evidence of intraluminal thrombus. There is normal enhancement of the dural venous sinuses with no evidence of dural venous sinus thrombosis. 1.. The aortic arch is normal in caliber without evidence of dissection or aneurysm. The origin of the great vessels as well as vertebral artery origins are unremarkable. 2. Both vertebral arteries are widely patent from their origin to the basilar artery. 3. The proximal common carotid arteries are tortuous. There is mild calcific plaquing involving the right carotid bulb with but with no associated stenosis. Left carotid bifurcation is unremarkable. The extracranial aspect of both ICAs are widely patent. 4. Persistent fetal origin of the right posterior cerebral artery. The posterior circulation is otherwise unremarkable. 5. Mild calcific plaquing involving the cavernous and supraclinoid segment of both ICAs with no associated stenosis. Both ICAs are patent to the terminus. The anterior and middle cerebral arteries are normal in caliber with no evidence of focal steno-occlusive disease or discrete berry aneurysm.  6. Heterogeneous nodule involving the right lobe of the thyroid. This would warrant follow-up with ultrasound if not previously documented.  Signed by Dr Rhonda Betancur      Assessment/Plan:    Stroke-like symptoms   - Neurology consultation and recommendations appreciated   - Aspirin and statin   - ECHO    - NIHSS/Neuro checks   - Nursing swallow assessment   - PT/OT/SLP   - FLP in a.m./ HgbA1c   - Allow permissive hypertension    - Monitor on telemetry    Type 2 diabetes mellitus  -Accucheck  -Sliding scale insulin   -Hypoglycemia protocol as warranted   Thyroid nodule   -Heterogenous nodule involving right lobe of the thyroid  follow-up with PCP for monitoring, has been seen on prior  imaging  2012    DVT prophylaxis Lovenox     Signed:  ROLDAN Flores - CNP, 5/22/2022 9:31 PM

## 2022-05-23 NOTE — CARE COORDINATION
/ Plan: MEDICARE PART A AND B / Product Type: *No Product type* /     Pre-Cert required for SNF:     []   Yes  [x]   No    Have Long Term Care Insurance:      []   Yes  [x]   No      Pharmacy:    [address] [address]  Phone: [phone] Fax: [address], [address] [phone]  [address]  [address] [place] [address]  Phone: [phone] Fax: [phone]    [facility] [address] [address] [phone] [facility] [place] [phone]  [address]  [address] [place] [address]  Phone: [phone] Fax: [phone]    Potential assistance purchasing medications? []   Yes  [x]   No      ADLS:       Support System:   Spouse/Significant Other      Current Home Environment:       Steps:        []   Yes  [x]   No    If yes, how many? NA    Plans to RETURN to current housing:     [x]   Yes  []   No    Barriers to RETURNING to current housing:  NA    Currently ACTIVE with Home Health CARE:      []   Yes  [x]   No    Home Health Care Agency:  [facility]    DME Provider:   [facility]    Transition Plan:  Home with family support    Transportation PLAN for Discharge:  Spouse    Patient Deficits:    [x]   Yes   []   No    If yes:    []   Confusion/Memory  []   Visual  [x]   Motor/Sensory         []   Right arm         []   Right leg         [x]   Left arm         [x]   Left leg  []   Language/Speech         []   Aphasia         []   Dysarthria         []   Swallow    NIH Stroke Scale  Interval: Reassessment  Level of Consciousness (1a): Alert  LOC Questions (1b): Answers both correctly  LOC Commands (1c): Performs both tasks correctly  Best Gaze (2): Normal  Visual (3): No visual loss  Facial Palsy (4): Normal symmetrical movement  Motor Arm, Left (5a): Drift, but does not hit bed  Motor Arm, Right (5b):  No drift  Motor Leg, Left (6a): No drift  Motor Leg, Right (6b): No drift  Limb Ataxia (7): Absent  Sensory (8): (!) Mild to Moderate  Best Language (9): No aphasia  Dysarthria (10): Normal  Extinction and Inattention (11): No abnormality  Total: 2    Xiang Coma Scale  Eye Opening: Spontaneous  Best Verbal Response: Oriented  Best Motor Response: Obeys commands  [place] Coma Scale Score: 15    Patient Deficit Notes:     L sided weakness resolving, will need PT/OT at home    Additional CM/SW Notes:   I spoke with patient and her spouse at bedside. She states that she does not have any steps and does not use any DME. She states that she has utilized [facility] in the past when she had Covid last year. She states that she would like  again. Reviewed Stroke booklet and pamphlet with patient and spouse. Discussed and answered questions. Both state understanding. Betty Palencia and/or her family were provided with choice of provider:    []   Yes   [x]   No        [x]   Stroke education booklet reviewed and given to patient/family/caregiver/guardian. All questions answered all questions answered appropriately and efficiently per family.       Afia Knox RN, BSN  [facility] [address]  Phone: [phone]   Fax: [phone]    Electronically signed by Afia Knox RN, BSN on 5/23/2022 at 2:30 PM

## 2022-05-24 VITALS
HEIGHT: 62 IN | OXYGEN SATURATION: 93 % | RESPIRATION RATE: 20 BRPM | HEART RATE: 92 BPM | SYSTOLIC BLOOD PRESSURE: 148 MMHG | WEIGHT: 174 LBS | BODY MASS INDEX: 32.02 KG/M2 | DIASTOLIC BLOOD PRESSURE: 63 MMHG | TEMPERATURE: 97.3 F

## 2022-05-24 LAB
ANION GAP SERPL CALCULATED.3IONS-SCNC: 8 MMOL/L (ref 7–19)
BUN BLDV-MCNC: 9 MG/DL (ref 8–23)
CALCIUM SERPL-MCNC: 9.1 MG/DL (ref 8.8–10.2)
CHLORIDE BLD-SCNC: 106 MMOL/L (ref 98–111)
CO2: 29 MMOL/L (ref 22–29)
CREAT SERPL-MCNC: 0.6 MG/DL (ref 0.5–0.9)
EKG P AXIS: 54 DEGREES
EKG P-R INTERVAL: 242 MS
EKG Q-T INTERVAL: 374 MS
EKG QRS DURATION: 98 MS
EKG QTC CALCULATION (BAZETT): 426 MS
EKG T AXIS: 58 DEGREES
GFR AFRICAN AMERICAN: >59
GFR NON-AFRICAN AMERICAN: >60
GLUCOSE BLD-MCNC: 116 MG/DL (ref 74–109)
GLUCOSE BLD-MCNC: 119 MG/DL (ref 70–99)
GLUCOSE BLD-MCNC: 120 MG/DL (ref 70–99)
HCT VFR BLD CALC: 39.3 % (ref 37–47)
HEMOGLOBIN: 12.4 G/DL (ref 12–16)
MCH RBC QN AUTO: 29.6 PG (ref 27–31)
MCHC RBC AUTO-ENTMCNC: 31.6 G/DL (ref 33–37)
MCV RBC AUTO: 93.8 FL (ref 81–99)
PDW BLD-RTO: 12.9 % (ref 11.5–14.5)
PERFORMED ON: ABNORMAL
PERFORMED ON: ABNORMAL
PLATELET # BLD: 238 K/UL (ref 130–400)
PMV BLD AUTO: 10.4 FL (ref 9.4–12.3)
POTASSIUM REFLEX MAGNESIUM: 4.3 MMOL/L (ref 3.5–5)
RBC # BLD: 4.19 M/UL (ref 4.2–5.4)
SODIUM BLD-SCNC: 143 MMOL/L (ref 136–145)
WBC # BLD: 8.2 K/UL (ref 4.8–10.8)

## 2022-05-24 PROCEDURE — 93246 EXT ECG>7D<15D RECORDING: CPT | Performed by: INTERNAL MEDICINE

## 2022-05-24 PROCEDURE — 99225 PR SBSQ OBSERVATION CARE/DAY 25 MINUTES: CPT | Performed by: PSYCHIATRY & NEUROLOGY

## 2022-05-24 PROCEDURE — 36415 COLL VENOUS BLD VENIPUNCTURE: CPT

## 2022-05-24 PROCEDURE — 93010 ELECTROCARDIOGRAM REPORT: CPT | Performed by: INTERNAL MEDICINE

## 2022-05-24 PROCEDURE — 82947 ASSAY GLUCOSE BLOOD QUANT: CPT

## 2022-05-24 PROCEDURE — 6370000000 HC RX 637 (ALT 250 FOR IP): Performed by: INTERNAL MEDICINE

## 2022-05-24 PROCEDURE — G0378 HOSPITAL OBSERVATION PER HR: HCPCS

## 2022-05-24 PROCEDURE — 85027 COMPLETE CBC AUTOMATED: CPT

## 2022-05-24 PROCEDURE — 6370000000 HC RX 637 (ALT 250 FOR IP)

## 2022-05-24 PROCEDURE — 80048 BASIC METABOLIC PNL TOTAL CA: CPT

## 2022-05-24 PROCEDURE — 6360000002 HC RX W HCPCS

## 2022-05-24 PROCEDURE — 96372 THER/PROPH/DIAG INJ SC/IM: CPT

## 2022-05-24 PROCEDURE — 94640 AIRWAY INHALATION TREATMENT: CPT

## 2022-05-24 PROCEDURE — 97116 GAIT TRAINING THERAPY: CPT

## 2022-05-24 RX ORDER — CLOPIDOGREL BISULFATE 75 MG/1
75 TABLET ORAL DAILY
Qty: 21 TABLET | Refills: 0 | Status: SHIPPED | OUTPATIENT
Start: 2022-05-24 | End: 2022-06-14

## 2022-05-24 RX ORDER — ATORVASTATIN CALCIUM 40 MG/1
40 TABLET, FILM COATED ORAL NIGHTLY
Qty: 30 TABLET | Refills: 1 | Status: SHIPPED | OUTPATIENT
Start: 2022-05-24

## 2022-05-24 RX ORDER — CLOPIDOGREL BISULFATE 75 MG/1
75 TABLET ORAL DAILY
Status: DISCONTINUED | OUTPATIENT
Start: 2022-05-24 | End: 2022-05-24 | Stop reason: HOSPADM

## 2022-05-24 RX ADMIN — ASPIRIN 81 MG: 81 TABLET, COATED ORAL at 08:03

## 2022-05-24 RX ADMIN — FAMOTIDINE 20 MG: 20 TABLET ORAL at 08:04

## 2022-05-24 RX ADMIN — ALBUTEROL SULFATE 2.5 MG: 2.5 SOLUTION RESPIRATORY (INHALATION) at 07:13

## 2022-05-24 RX ADMIN — ENOXAPARIN SODIUM 40 MG: 100 INJECTION SUBCUTANEOUS at 08:04

## 2022-05-24 RX ADMIN — INSULIN LISPRO 5 UNITS: 100 INJECTION, SOLUTION INTRAVENOUS; SUBCUTANEOUS at 12:28

## 2022-05-24 RX ADMIN — CLOPIDOGREL BISULFATE 75 MG: 75 TABLET ORAL at 16:11

## 2022-05-24 NOTE — DISCHARGE SUMMARY
[place] [address]  DEPARTMENT OF HOSPITALIST MEDICINE    DISCHARGE SUMMARY:        Arjun Lopez  :  1950  MRN:  [id number]    Admit date:  2022  Discharge date: 2022      Admitting Physician:  No admitting provider for patient encounter. Advance Directive: Full Code    Consults Made:   IP CONSULT TO PHARMACY  PHARMACY TO CHANGE BASE FLUIDS  IP CONSULT TO NEUROLOGY      Primary Care Physician:  Janet Barber MD    Discharge Diagnoses:  Principal Problem:    Stroke-like symptoms  Active Problems:    Thyroid nodule    Essential hypertension    Type 2 diabetes mellitus ([facility] [address]  Resolved Problems:    * No resolved hospital problems. *          Pertinent Labs:  CBC with DIFF:  Recent Labs     22   WBC 9.2 9.8 8.2   RBC 4.56 4.38 4.19*   HGB 13.4 13.1 12.4   HCT 42.5 40.1 39.3   MCV 93.2 91.6 93.8   MCH 29.4 29.9 29.6   MCHC 31.5* 32.7* 31.6*   RDW 12.6 12.5 12.9    256 238   MPV 10.7 10.6 10.4   NEUTOPHILPCT 46.6*  --   --    LYMPHOPCT 43.8*  --   --    MONOPCT 7.8  --   --    EOSRELPCT 0.8  --   --    BASOPCT 0.8  --   --    NEUTROABS 4.3  --   --    LYMPHSABS 4.1  --   --    MONOSABS 0.70  --   --    EOSABS 0.10  --   --    BASOSABS 0.10  --   --        CMP/BMP:  Recent Labs     22  05    139 143   K 3.6 4.1 4.3    105 106   CO2 23 24 29   ANIONGAP 14 10 8   GLUCOSE 192* 128* 116*   BUN 13 9 9   CREATININE 0.7 0.5 0.6   LABGLOM >60 >60 >60   CALCIUM 9.2 9.1 9.1   PROT 6.9  --   --    LABALBU 4.0  --   --    BILITOT 0.3  --   --    ALKPHOS 67  --   --    ALT 19  --   --    AST 25  --   --          CRP:  No results for input(s): CRP in the last 72 hours. Sed Rate:  No results for input(s): SEDRATE in the last 72 hours.       HgBA1c:  No components found for: HGBA1C  FLP:    Lab Results   Component Value Date    TRIG 177 2022    HDL 43 2022    LDLCALC 112 2022 TSH:  No results found for: TSH  Troponin T:   Recent Labs     05/22/22 1940   TROPONINI <0.01     Pro-BNP: No results for input(s): BNP in the last 72 hours. INR:   Recent Labs     05/22/22 1940   INR 0.95     ABGs:   Lab Results   Component Value Date    PHART 7.440 05/09/2021    PO2ART 49.0 05/09/2021    XGR7OAA 35.0 05/09/2021     UA:No results for input(s): NITRITE, COLORU, PHUR, LABCAST, WBCUA, RBCUA, MUCUS, TRICHOMONAS, YEAST, BACTERIA, CLARITYU, SPECGRAV, LEUKOCYTESUR, UROBILINOGEN, BILIRUBINUR, BLOODU, GLUCOSEU, AMORPHOUS in the last 72 hours. Invalid input(s): Michela McNeil      Culture Results:    No results for input(s): CXSURG in the last 720 hours. Blood Culture Recent: No results for input(s): BC in the last 720 hours. Cultures:   No results for input(s): CULTURE in the last 72 hours. No results for input(s): BC, Ronita John Day in the last 72 hours. No results for input(s): CXSURG in the last 72 hours. No results for input(s): MG, PHOS in the last 72 hours. Recent Labs     05/22/22 1940   AST 25   ALT 19   BILITOT 0.3   ALKPHOS 67           Significant Diagnostic Studies:   Echo Complete    Result Date: 5/23/2022  Transthoracic Echocardiography Report (TTE)  Demographics   Patient Name   Merced Lanier  Date of Study           05/23/2022   MRN            [id number]             Gender                  Female   Date of Birth  1950         Room Number             [id number]   Age            74 year(s)   Height:        62 inches          Referring Physician     Ronda Correa   Weight:        174 pounds         Sonographer             Jessie Wiley   BSA:           1.8 m^2            Interpreting Physician  Yadi Earl MD   BMI:           31.83 kg/m^2  Procedure Type of Study   TTE procedure:ECHO NO CONTRAST WITH DOP/COLR. Study Location: Echo Lab Technical Quality: Adequate visualization Patient Status: Inpatient Contrast Medium: Bubble Study.  HR: 100 bpm BP: 157/75 mmHg Indications:Stroke. Conclusions   Summary  Normal left ventricular size and systolic function EF 76%  Mild concentric left ventricular hypertrophy with mild [grade 1] diastolic  dysfunction  Normal left atrial size  Tricuspid aortic valve with adequate cusp separation neither stenosis or  insufficiency  Normally mobile mitral valve with no demonstrable regurgitation  No evidence of pulmonic valvular stenosis or insufficiency  Right-sided chambers are normal size with preserved RV systolic function  No tricuspid regurgitation with which to estimate RVSP  IVC dimension and inspiratory motion are normal consistent with normal  right atrial filling pressures  Aortic root dimensions fall within normal limits with mild dilatation of  the ascending aorta measuring 3.2 cm  Physiologic amount of anterior pericardial fluid  Injection of agitated saline with and without Valsalva maneuver  demonstrates no intracardiac communication  Survey of the aortic arch reveals no abnormality   Signature   ----------------------------------------------------------------  Electronically signed by Andrea Perea MD(Interpreting physician)  on 05/23/2022 10:16 AM  ----------------------------------------------------------------  2D Measurements and Calculations(cm)   LVIDd: 4.39 cm                      LVIDs: 3.13 cm  IVSd: 0.96 cm  LVPWd: 1 cm                         AO Root Dimension: 2 cm  Rt. Vent.  Dimension: 2.67 cm        LA Dimension: 2.7 cm  % Ejection Fraction: 55 %           LA Area: 12.6 cm^2  LA Volume: 30.2 ml                  LV Systolic dimension: 3.42YG  LA Volume Index: 17 ml/m^2          LV PW diastolic: 1cm  LV dimension: 4.39 cm               LVOT diameter: 2 cm                                      RA Systolic pressure: 3mmHg                                      RV Diastolic dimension: 2.56LX  Cardic Output (CO): 7.76l/min  Ascending Aorta: 3.2 cm  Doppler Measurements and Calculations   AV Peak Velocity:167 cm/s MV Peak E-Wave: 65.1 cm/s  AV Mean Velocity:111 cm/s               MV Peak A-Wave: 130 cm/s  AV Peak Gradient: 11.16 mmHg            MV E/A Ratio: 0.5 %  AV Mean Gradient: 6 mmHg                MV Mean velocity: 82.2cm/s  AV Area (Continuity):2.93 cm^2          MV Peak Gradient: 1.7 mmHg  AV VTI:26.5 cm/s                        MV Mean gradient: 3 mmHg                                          MV P1/2t: 39 msec  Estimated RAP:3 mmHg                    MVA by PHT5.64 cm^2   MV E' septal velocity: 5.77cm/s  MV E' lateral velocity:6.42 cm/s      CT HEAD WO CONTRAST    Result Date: 5/22/2022  CT BRAIN without contrast dated 5/22/2022 7:54 PM HISTORY: Extremity weakness and facial numbness. Stroke symptoms. COMPARISON: None DOSE LENGTH PRODUCT: 757 mGy cm. All CT scans are performed using dose optimization techniques as appropriate to the performed exam and include at least one of the following: Automated exposure control, adjustment of the mA and/or kV according to size, and the use of the iterative reconstruction technique. TECHNIQUE: Serial axial tomographic images of the brain were obtained without the use of intravenous contrast. FINDINGS: The midline structures are nondisplaced. The ventricles and basilar cisterns are normal in size and configuration. There is no evidence of intracranial hemorrhage or mass-effect. The gray-white matter differentiation is maintained. The sulci have a normal configuration, and there are no abnormal extra-axial fluid collections. The structures of the posterior fossa are unremarkable. The included orbits and their contents are unremarkable. The visualized paranasal sinuses, mastoid air cells and middle ear cavities are clear. The visualized osseous structures and overlying soft tissues of the skull and face are unremarkable. 1. No acute intracranial process.  The code stroke report was phoned to Dr. Rupert Massey in the emergency room at 8:02 PM. Signed by Dr Mamta Goldman PORTABLE    Result Date: 5/22/2022  EXAMINATION: Chest one view 5/22/2022 HISTORY: Stroke symptoms FINDINGS: Today's exam is compared to previous study of 5/9/2021. There is elevation right hemidiaphragm with right basilar atelectasis. Lungs are otherwise clear. There is no effusion or free air present. 1.. Elevated right diaphragm with right basilar atelectasis. Lungs are otherwise clear. Signed by Dr Andrés Ly    Result Date: 5/22/2022  EXAMINATION: CT angiogram of the head and neck with contrast 5/22/2022 HISTORY: Stroke symptoms Dose: 605 mGycm. All CT scans are performed using dose optimization techniques as appropriate to the performed exam and include at least one of the following: Automated exposure control, adjustment of the mA and/or kV according to size, and the use of the iterative reconstruction technique. Melody Travis FINDINGS: Multiple contiguous axial images are obtained from the aortic arch to the vertex following intravenous contrast infusion with reformatted images obtained in the sagittal and coronal projections from the original data set as well as MIPS. There is some enlargement of the right lobe of the thyroid gland with a heterogeneous 1.7 cm nodule. If not previously documented follow-up with thyroid ultrasound could BE obtained for further evaluation. Level of the true and false cords is unremarkable. There is no evidence of adenopathy in the supraclavicular fossa. No pathologically enlarged cervical chain or posterior triangle lymphadenopathy is present. The thoracic aorta is mildly ectatic. The origin of the great vessels are unremarkable with no focal stenosis or plaquing. The proximal great vessels are tortuous. The origin of both vertebral arteries are widely patent. Both vertebral arteries are widely patent from their origin to the basilar artery. The right common carotid artery is widely patent.  There is mild calcific plaquing involving the right carotid bulb with no associated cross-sectional stenosis. The right ICA is widely patent from the carotid bifurcation to the skull base. The left common carotid artery is tortuous but widely patent. The left carotid bifurcation and extracranial ICA are widely patent with no focal stenosis or plaquing. Both distal vertebral arteries are widely patent. The posterior inferior cerebellar arteries are normal in appearance. The basilar artery, superior cerebellar arteries and left posterior cerebral artery are normal in appearance with no focal stenosis or plaquing. There is a persistent fetal origin of the right posterior cerebral artery which is widely patent. The petrous, cavernous and supraclinoid segment of both ICAs demonstrates minimal plaquing involving the cavernous and supraclinoid segment of both ICAs without associated stenosis. The anterior and middle cerebral arteries are normal in caliber with no evidence of focal steno-occlusive disease or discrete berry aneurysm. No evidence of intraluminal thrombus. There is normal enhancement of the dural venous sinuses with no evidence of dural venous sinus thrombosis. 1.. The aortic arch is normal in caliber without evidence of dissection or aneurysm. The origin of the great vessels as well as vertebral artery origins are unremarkable. 2. Both vertebral arteries are widely patent from their origin to the basilar artery. 3. The proximal common carotid arteries are tortuous. There is mild calcific plaquing involving the right carotid bulb with but with no associated stenosis. Left carotid bifurcation is unremarkable. The extracranial aspect of both ICAs are widely patent. 4. Persistent fetal origin of the right posterior cerebral artery. The posterior circulation is otherwise unremarkable. 5. Mild calcific plaquing involving the cavernous and supraclinoid segment of both ICAs with no associated stenosis. Both ICAs are patent to the terminus.  The anterior and middle cerebral arteries are normal in caliber with no evidence of focal steno-occlusive disease or discrete berry aneurysm. 6. Heterogeneous nodule involving the right lobe of the thyroid. This would warrant follow-up with ultrasound if not previously documented. Signed by Dr Julian Funes    Result Date: 5/23/2022  History: Possible stroke, left facial and upper extremity numbness with ataxia MRI BRAIN: Multiplanar imaging the brain performed pre- and post-IV contrast. COMPARISON: CT head 5/22/2022 FINDINGS: 2 punctate foci of diffusion restriction seen within the right thalamus consistent with nonhemorrhagic acute lacunar infarcts. No additional diffusion restriction identified. Minimal hyperintense periventricular white matter FLAIR signal favoring underlying chronic small vessel ischemia. No intracranial mass. No abnormal extra-axial fluid collection. No intracranial blood products. Prominent perivascular space is suspected along the left basal ganglia. No abnormal intracranial enhancement. No sellar mass. Corpus callosum appears intact. Cerebellar tonsils positioned above the foramen magnum. Limited assessment of the orbits and base of skull is unremarkable. Mild chronic mucosal thickening ethmoid sinuses. Appropriate flow voids in the distal internal carotid and basilar arteries. 1. 2 nonhemorrhagic acute lacunar infarcts involving the right thalamus. Findings called to Jael Eason, nurse caring for the patient on the fifth floor, at 2:21 PM on 5/23/2022 2. Mild chronic small vessel ischemic change.  Signed by Dr Suan Duverney        Echo:  05/22/2022  Summary   Normal left ventricular size and systolic function EF 35%   Mild concentric left ventricular hypertrophy with mild [grade 1] diastolic   dysfunction   Normal left atrial size   Tricuspid aortic valve with adequate cusp separation neither stenosis or   insufficiency   Normally mobile mitral valve with no demonstrable regurgitation   No evidence of pulmonic valvular stenosis or insufficiency   Right-sided chambers are normal size with preserved RV systolic function   No tricuspid regurgitation with which to estimate RVSP   IVC dimension and inspiratory motion are normal consistent with normal   right atrial filling pressures   Aortic root dimensions fall within normal limits with mild dilatation of   the ascending aorta measuring 3.2 cm   Physiologic amount of anterior pericardial fluid   Injection of agitated saline with and without Valsalva maneuver   demonstrates no intracardiac communication   Survey of the aortic arch reveals no abnormality      Signature      ----------------------------------------------------------------   Electronically signed by Sharyn Hankins MD(Interpreting physician)   on 05/23/2022 10:16 AM   ----------------------------------------------------------------       Hospital Course:   Ms Alycia Yu, a 74 y. o. female who presented to [facility] ER with PMH HTN, type II DM, complaining of left-sided weakness.       Patient states that she was working out in her yard today picking up sticks when she acutely developed weakness in her left leg and numbness to her left hand.  In addition she noted numbness and tingling to the left corner of her mouth.      CT head no acute intracranial process,   CTA head neck vertebral arteries are widely patent, both ICA patent,   CXR right basilar atelectasis,      Patient admitted for further workup and management, including; with consultation to neurology.        Left facial, upper extremity numbness and unsteadiness. Nonhemorrhagic Acute basal ganglia Thalamic Lacunar infarcts  · Antiplatelet Therapy  · Statin  · TTE, with agitated saline bubble study (05/22/2022): Normal LV size and systolic function. Estimated EF of 60%. Mild grade 1 diastolic dysfunction.   Injection of agitated saline with and without Valsalva maneuver  ·  demonstrates no intracardiac communication  · MRI Brain W / WO Con (05/23/2022): Impression: 2 nonhemorrhagic acute lacunar infarcts involving the right thalamus. Mild chronic small vessel ischemic change. · CTA Neck W Con (05/23/2022): Impression:  The aortic arch is normal in caliber without evidence of dissection or aneurysm. The origin of the great vessels as well as vertebral artery origins are unremarkable. Both vertebral arteries are widely patent from their origin to the basilar artery. The proximal common carotid arteries are tortuous. There is mild calcific plaquing involving the right carotid bulb with but with no associated stenosis. Left carotid bifurcation is unremarkable. The extracranial aspect of both ICAs are widely patent. Persistent fetal origin of the right posterior cerebral artery. The posterior circulation is otherwise unremarkable. Mild calcific plaquing involving the cavernous and supraclinoid segment of both ICAs with no associated stenosis. Both ICAs are patent to the terminus. The anterior and middle cerebral arteries are normal in caliber with no evidence of focal steno-occlusive disease or discrete berry aneurysm. Heterogeneous nodule involving the right lobe of the thyroid. This would warrant follow-up with ultrasound if not previously documented. · PT/OT & SLP  · Neuro check Q4 hours. · Neurology on board   · Okay for discharge from neurology standpoint  · ZIO patch, to rule out possible A. Fib. · Okay for discharge from neurology standpoint, will follow recommendations;  · Clopidogrel 75 mg p.o. daily x21 days  · Aspirin 81 mg p.o. daily  · Follow-up with neurology outpatient     Right Thyroid Nodule  · CTA Neck W Con (05/23/2022): Heterogeneous nodule involving the right lobe of the thyroid. This would warrant follow-up with ultrasound if not previously documented. · Thyroid US-ordered, to be performed outpatient and followed up with PCP.       Diabetes Mellitus II  · Inpatient Regimens to included;  ? - Insulin Glargine (Lantus) 20 units subcu nightly  ? - Insulin Lispro (Humalog) 5 units subcu pre-meal 3 times a day  ? - Insulin Lispro (Humalog) on a Low dose sliding scale  · Monitored POC glucose, and adjusted insulin regimen accordingly based on daily insulin requirement.     Thyroid nodule  · Heterogenous nodule involving right lobe of the thyroid        · Follow-up with PCP for monitoring, has been seen on prior    imaging  2012           Continued management of other chronic medical conditions      Physical Exam:  Vital Signs: BP (!) 148/63   Pulse 92   Temp 97.3 °F (36.3 °C) (Temporal)   Resp 20   Ht 5' 2\" (1.575 m)   Wt 174 lb (78.9 kg)   SpO2 93%   BMI 31.83 kg/m²   Physical Exam  Vitals and nursing note reviewed. Constitutional:       General: She is not in acute distress. Appearance: Normal appearance. She is not ill-appearing, toxic-appearing or diaphoretic. HENT:      Head: Normocephalic and atraumatic. Right Ear: External ear normal.      Left Ear: External ear normal.      Nose: Nose normal. No congestion or rhinorrhea. Mouth/Throat:      Mouth: Mucous membranes are moist.      Pharynx: Oropharynx is clear. No oropharyngeal exudate or posterior oropharyngeal erythema. Eyes:      General: No scleral icterus. Right eye: No discharge. Left eye: No discharge. Extraocular Movements: Extraocular movements intact. Conjunctiva/sclera: Conjunctivae normal.      Pupils: Pupils are equal, round, and reactive to light. Cardiovascular:      Rate and Rhythm: Normal rate and regular rhythm. Pulses: Normal pulses. Heart sounds: Normal heart sounds. No murmur heard. No friction rub. No gallop. Pulmonary:      Effort: Pulmonary effort is normal. No respiratory distress. Breath sounds: Normal breath sounds. No stridor. No wheezing, rhonchi or rales. Chest:      Chest wall: No tenderness. Abdominal:      General: Bowel sounds are normal. There is no distension.       Palpations: Abdomen is soft. Tenderness: There is no abdominal tenderness. There is no guarding or rebound. Musculoskeletal:         General: No swelling, tenderness, deformity or signs of injury. Normal range of motion. Cervical back: Normal range of motion and neck supple. No rigidity. No muscular tenderness. Right lower leg: No edema. Left lower leg: No edema. Skin:     General: Skin is warm and dry. Capillary Refill: Capillary refill takes less than 2 seconds. Coloration: Skin is not jaundiced or pale. Findings: No bruising, erythema, lesion or rash. Neurological:      General: No focal deficit present. Mental Status: She is alert and oriented to person, place, and time. Cranial Nerves: No cranial nerve deficit. Sensory: No sensory deficit. Motor: No weakness. Coordination: Coordination normal.   Psychiatric:         Mood and Affect: Mood normal.         Behavior: Behavior normal.         Thought Content:  Thought content normal.         Judgment: Judgment normal.       Discharge Medications:        Medication List      START taking these medications    atorvastatin 40 MG tablet  Commonly known as: LIPITOR  Take 1 tablet by mouth nightly     clopidogrel 75 MG tablet  Commonly known as: PLAVIX  Take 1 tablet by mouth daily for 21 days        CONTINUE taking these medications    albuterol sulfate  (90 Base) MCG/ACT inhaler  Inhale 2 puffs into the lungs 4 times daily     Allegra Allergy 180 MG tablet  Generic drug: fexofenadine     aspirin 81 MG EC tablet     azelastine 0.1 % nasal spray  Commonly known as: ASTELIN     bisoprolol-hydroCHLOROthiazide 5-6.25 MG per tablet  Commonly known as: ZIAC     Calcium 500/D 500-125 MG-UNIT Tabs  Generic drug: Calcium Carb-Cholecalciferol     melatonin 5 MG Tbdp disintegrating tablet  Take 1 tablet by mouth nightly     niacin 500 MG tablet     Pepcid 20 MG tablet  Generic drug: famotidine     vitamin C 500 MG translation of spoken language may permit erroneous, or at times, nonsensical words or phrases to be inadvertently transcribed; although attempts have made to review the note for such errors, some may still exist.

## 2022-05-24 NOTE — PROGRESS NOTES
[facility] Neurology Progress Note      Patient:   Neema Barrios  MR#:    [id number]   Room:    [id number]-   YOB: 1950  Date of Progress Note: 5/24/2022  Time of Note                           8:57 AM  Consulting Physician:  Simeon Disla DO  Attending Physician:  Lexa Kumar MD      INTERVAL HISTORY:  Doing better this am, numbness, unsteadiness largely resolved, headache improved. REVIEW OF SYSTEMS:  Constitutional: No fevers No chills  Neck:No stiffness  Respiratory: No shortness of breath  Cardiovascular: No chest pain No palpitations  Gastrointestinal: No abdominal pain    Genitourinary: No Dysuria  Neurological: No confusion    PHYSICAL EXAM:    Constitutional -   BP (!) 148/63   Pulse 92   Temp 97.3 °F (36.3 °C) (Temporal)   Resp 20   Ht 5' 2\" (1.575 m)   Wt 174 lb (78.9 kg)   SpO2 93%   BMI 31.83 kg/m²   General appearance: No acute distress   EYES -   Conjunctiva normal  Pupillary exam as below, see CN exam in the neurologic exam  ENT-    No scars, masses, or lesions over external nose or ears  Hearing normal bilaterally to finger rub  Neck-   No neck masses noted  Thyroid normal   No jugular vein distension  Cardiovascular -   No clubbing, cyanosis, or edema   Pulmonary-   Good expansion, normal effort without use of accessory muscles  Musculoskeletal -   No significant wasting of muscles noted  Gait as below, see gait exam in the neurologic exam  Muscle strength, tone, stability as below see the motor exam in the neurologic exam.   No bony deformities  Skin -   Warm, dry, and intact to inspection and palpation. No rash, erythema, or pallor  Psychiatric -   Mood, affect, and behavior appear normal    Memory as below see mental status examination in the neurologic exam         NEUROLOGICAL EXAM     Mental status    [x]? Awake, alert, oriented   [x]? Affect attention and concentration appear appropriate  [x]? Recent and remote memory appears unremarkable  [x]? Bubble Study. HR: 100 bpm BP: 157/75 mmHg Indications:Stroke. Conclusions   Summary  Normal left ventricular size and systolic function EF 33%  Mild concentric left ventricular hypertrophy with mild [grade 1] diastolic  dysfunction  Normal left atrial size  Tricuspid aortic valve with adequate cusp separation neither stenosis or  insufficiency  Normally mobile mitral valve with no demonstrable regurgitation  No evidence of pulmonic valvular stenosis or insufficiency  Right-sided chambers are normal size with preserved RV systolic function  No tricuspid regurgitation with which to estimate RVSP  IVC dimension and inspiratory motion are normal consistent with normal  right atrial filling pressures  Aortic root dimensions fall within normal limits with mild dilatation of  the ascending aorta measuring 3.2 cm  Physiologic amount of anterior pericardial fluid  Injection of agitated saline with and without Valsalva maneuver  demonstrates no intracardiac communication  Survey of the aortic arch reveals no abnormality   Signature   ----------------------------------------------------------------  Electronically signed by Devang Samson MD(Interpreting physician)  on 05/23/2022 10:16 AM  ----------------------------------------------------------------  2D Measurements and Calculations(cm)   LVIDd: 4.39 cm                      LVIDs: 3.13 cm  IVSd: 0.96 cm  LVPWd: 1 cm                         AO Root Dimension: 2 cm  Rt. Vent.  Dimension: 2.67 cm        LA Dimension: 2.7 cm  % Ejection Fraction: 55 %           LA Area: 12.6 cm^2  LA Volume: 30.2 ml                  LV Systolic dimension: 4.64RZ  LA Volume Index: 17 ml/m^2          LV PW diastolic: 1cm  LV dimension: 4.39 cm               LVOT diameter: 2 cm                                      RA Systolic pressure: 3mmHg                                      RV Diastolic dimension: 4.37YH  Cardic Output (CO): 7.76l/min  Ascending Aorta: 3.2 cm  Doppler Measurements and Calculations   AV Peak Velocity:167 cm/s               MV Peak E-Wave: 65.1 cm/s  AV Mean Velocity:111 cm/s               MV Peak A-Wave: 130 cm/s  AV Peak Gradient: 11.16 mmHg            MV E/A Ratio: 0.5 %  AV Mean Gradient: 6 mmHg                MV Mean velocity: 82.2cm/s  AV Area (Continuity):2.93 cm^2          MV Peak Gradient: 1.7 mmHg  AV VTI:26.5 cm/s                        MV Mean gradient: 3 mmHg                                          MV P1/2t: 39 msec  Estimated RAP:3 mmHg                    MVA by PHT5.64 cm^2   MV E' septal velocity: 5.77cm/s  MV E' lateral velocity:6.42 cm/s      CT HEAD WO CONTRAST    Result Date: 5/22/2022  CT BRAIN without contrast dated 5/22/2022 7:54 PM HISTORY: Extremity weakness and facial numbness. Stroke symptoms. COMPARISON: None DOSE LENGTH PRODUCT: 757 mGy cm. All CT scans are performed using dose optimization techniques as appropriate to the performed exam and include at least one of the following: Automated exposure control, adjustment of the mA and/or kV according to size, and the use of the iterative reconstruction technique. TECHNIQUE: Serial axial tomographic images of the brain were obtained without the use of intravenous contrast. FINDINGS: The midline structures are nondisplaced. The ventricles and basilar cisterns are normal in size and configuration. There is no evidence of intracranial hemorrhage or mass-effect. The gray-white matter differentiation is maintained. The sulci have a normal configuration, and there are no abnormal extra-axial fluid collections. The structures of the posterior fossa are unremarkable. The included orbits and their contents are unremarkable. The visualized paranasal sinuses, mastoid air cells and middle ear cavities are clear. The visualized osseous structures and overlying soft tissues of the skull and face are unremarkable. 1. No acute intracranial process.  The code stroke report was phoned to Dr. Delisa Mooney in the emergency room at 8:02 PM. Signed by Dr Sebastián Johnson    Result Date: 5/22/2022  EXAMINATION: Chest one view 5/22/2022 HISTORY: Stroke symptoms FINDINGS: Today's exam is compared to previous study of 5/9/2021. There is elevation right hemidiaphragm with right basilar atelectasis. Lungs are otherwise clear. There is no effusion or free air present. 1.. Elevated right diaphragm with right basilar atelectasis. Lungs are otherwise clear. Signed by Dr Jules Arora    Result Date: 5/22/2022  EXAMINATION: CT angiogram of the head and neck with contrast 5/22/2022 HISTORY: Stroke symptoms Dose: 605 mGycm. All CT scans are performed using dose optimization techniques as appropriate to the performed exam and include at least one of the following: Automated exposure control, adjustment of the mA and/or kV according to size, and the use of the iterative reconstruction technique. Neel Maldonadoodore FINDINGS: Multiple contiguous axial images are obtained from the aortic arch to the vertex following intravenous contrast infusion with reformatted images obtained in the sagittal and coronal projections from the original data set as well as MIPS. There is some enlargement of the right lobe of the thyroid gland with a heterogeneous 1.7 cm nodule. If not previously documented follow-up with thyroid ultrasound could BE obtained for further evaluation. Level of the true and false cords is unremarkable. There is no evidence of adenopathy in the supraclavicular fossa. No pathologically enlarged cervical chain or posterior triangle lymphadenopathy is present. The thoracic aorta is mildly ectatic. The origin of the great vessels are unremarkable with no focal stenosis or plaquing. The proximal great vessels are tortuous. The origin of both vertebral arteries are widely patent. Both vertebral arteries are widely patent from their origin to the basilar artery. The right common carotid artery is widely patent.  There is mild the terminus. The anterior and middle cerebral arteries are normal in caliber with no evidence of focal steno-occlusive disease or discrete berry aneurysm. 6. Heterogeneous nodule involving the right lobe of the thyroid. This would warrant follow-up with ultrasound if not previously documented. Signed by Dr Denilson Wakefield    Result Date: 5/23/2022  History: Possible stroke, left facial and upper extremity numbness with ataxia MRI BRAIN: Multiplanar imaging the brain performed pre- and post-IV contrast. COMPARISON: CT head 5/22/2022 FINDINGS: 2 punctate foci of diffusion restriction seen within the right thalamus consistent with nonhemorrhagic acute lacunar infarcts. No additional diffusion restriction identified. Minimal hyperintense periventricular white matter FLAIR signal favoring underlying chronic small vessel ischemia. No intracranial mass. No abnormal extra-axial fluid collection. No intracranial blood products. Prominent perivascular space is suspected along the left basal ganglia. No abnormal intracranial enhancement. No sellar mass. Corpus callosum appears intact. Cerebellar tonsils positioned above the foramen magnum. Limited assessment of the orbits and base of skull is unremarkable. Mild chronic mucosal thickening ethmoid sinuses. Appropriate flow voids in the distal internal carotid and basilar arteries. 1. 2 nonhemorrhagic acute lacunar infarcts involving the right thalamus. Findings called to Fadumo Ledbetter, nurse caring for the patient on the fifth floor, at 2:21 PM on 5/23/2022 2. Mild chronic small vessel ischemic change.  Signed by Dr Erika Mackey      Lab Results   Component Value Date    WBC 8.2 05/24/2022    HGB 12.4 05/24/2022    HCT 39.3 05/24/2022    MCV 93.8 05/24/2022     05/24/2022     Lab Results   Component Value Date     05/24/2022    K 4.3 05/24/2022     05/24/2022    CO2 29 05/24/2022    BUN 9 05/24/2022    CREATININE 0.6 05/24/2022 GLUCOSE 116 (H) 05/24/2022    CALCIUM 9.1 05/24/2022    PROT 6.9 05/22/2022    LABALBU 4.0 05/22/2022    BILITOT 0.3 05/22/2022    ALKPHOS 67 05/22/2022    AST 25 05/22/2022    ALT 19 05/22/2022    LABGLOM >60 05/24/2022    GFRAA >59 05/24/2022     Lab Results   Component Value Date    INR 0.95 05/22/2022    INR 1.11 05/09/2021    PROTIME 12.6 05/22/2022    PROTIME 14.3 05/09/2021       RECORD REVIEW:   Previous medical records, medications were reviewed at today's visit. Nursing/physician notes, imaging, labs and vitals reviewed. PT,OT and/or speech notes reviewed         ASSESSMENT:  74 y.o. admitted with left facial and upper extremity numbness, unsteadiness. MRI consistent with basal ganglia lacunar infarcts. Suspect small vessel event more so than embolic. CTA head and neck negative. ECHO negative. Exam improving.         PLAN:  1. Follow Tele, will send home with zio to exclude a fib. 2.  Continue antiplatelet therapy, statin, blood glucose maximization, risk factor maximization. 3.  PT, OT, ST  4. DVT proph   5. Likely home today     Please feel free to call with any questions. [phone] (cell phone).       Maricruz Holden DO  Board Certified Neurology

## 2022-05-24 NOTE — PROGRESS NOTES
Occupational Therapy  Pt in bed with friend present. Pt states she will be going home today and that she is going to the bathroom and getting around her room on her own.  Electronically signed by DONALD Moy on 5/24/2022 at 11:37 AM

## 2022-05-24 NOTE — PLAN OF CARE
Problem: Discharge Planning  Goal: Discharge to home or other facility with appropriate resources  5/24/2022 1151 by Shanna Partida RN  Outcome: Progressing  Flowsheets (Taken 5/24/2022 1133)  Discharge to home or other facility with appropriate resources:   Identify barriers to discharge with patient and caregiver   Arrange for needed discharge resources and transportation as appropriate   Identify discharge learning needs (meds, wound care, etc)   Refer to discharge planning if patient needs post-hospital services based on physician order or complex needs related to functional status, cognitive ability or social support system  5/24/2022 0336 by Martell Lanes, RN  Outcome: Progressing  Flowsheets (Taken 5/23/2022 2359)  Discharge to home or other facility with appropriate resources: Identify barriers to discharge with patient and caregiver     Problem: Safety - Adult  Goal: Free from fall injury  5/24/2022 1151 by Shanna Partida RN  Outcome: Progressing  Flowsheets (Taken 5/24/2022 1145)  Free From Fall Injury: Instruct family/caregiver on patient safety  5/24/2022 0336 by Martell Lanes, RN  Outcome: Progressing  [address] (Taken 5/24/2022 0333)  Free From Fall Injury: Instruct family/caregiver on patient safety     Problem: ABCDS Injury Assessment  Goal: Absence of physical injury  5/24/2022 1151 by Shanna Partida RN  Outcome: Progressing  Flowsheets (Taken 5/24/2022 1145)  Absence of Physical Injury: Implement safety measures based on patient assessment  5/24/2022 0336 by Martell Lanes, RN  Outcome: Progressing  Flowsheets (Taken 5/24/2022 6699)  Absence of Physical Injury: Implement safety measures based on patient assessment     Problem: Chronic Conditions and Co-morbidities  Goal: Patient's chronic conditions and co-morbidity symptoms are monitored and maintained or improved  5/24/2022 1151 by Shanna Partida RN  Outcome: Progressing  Flowsheets (Taken 5/24/2022 1133)  Care Plan - Patient's Chronic Conditions and Co-Morbidity Symptoms are Monitored and Maintained or Improved: Monitor and assess patient's chronic conditions and comorbid symptoms for stability, deterioration, or improvement  5/24/2022 0336 by Bella Richardson RN  Outcome: Progressing  Flowsheets (Taken 5/23/2022 4496)  Care Plan - Patient's Chronic Conditions and Co-Morbidity Symptoms are Monitored and Maintained or Improved:   Monitor and assess patient's chronic conditions and comorbid symptoms for stability, deterioration, or improvement   Collaborate with multidisciplinary team to address chronic and comorbid conditions and prevent exacerbation or deterioration   Update acute care plan with appropriate goals if chronic or comorbid symptoms are exacerbated and prevent overall improvement and discharge     Problem: Pain  Goal: Verbalizes/displays adequate comfort level or baseline comfort level  5/24/2022 1151 by Shanna Partida RN  Outcome: Progressing  5/24/2022 0336 by Bella Richardsno RN  Outcome: Progressing     Problem: Musculoskeletal - Adult  Goal: Return mobility to safest level of function  5/24/2022 1151 by Shanna Partida RN  Outcome: Progressing  Flowsheets (Taken 5/24/2022 1133)  Return Mobility to Safest Level of Function: Assess patient stability and activity tolerance for standing, transferring and ambulating with or without assistive devices  5/24/2022 0336 by Bella Richardson RN  Outcome: Progressing  Flowsheets (Taken 5/23/2022 2541)  Return Mobility to Safest Level of Function: Assess patient stability and activity tolerance for standing, transferring and ambulating with or without assistive devices

## 2022-05-24 NOTE — PROGRESS NOTES
Physical Therapy  Name: Kofi   MRN:  [id number]  Date of service:  5/24/2022 05/24/22 1502   Restrictions/Precautions   Restrictions/Precautions Fall Risk   Required Braces or Orthoses? No   Subjective   Subjective Pt agrees to work with therapy. Bed Mobility   Supine to Sit Stand by assistance   Transfers   Sit to Stand Stand by assistance   Stand to sit Stand by assistance   Ambulation   Surface level tile   Device No Device   Assistance Contact guard assistance   Quality of Gait a little unsteady intermittently   Gait Deviations Slow Andra;Decreased step length;Decreased step height;Decreased arm swing   Distance 400'   Long Term Goals   Time Frame for Long term goals  amb 200 ft with sba   Conditions Requiring Skilled Therapeutic Intervention   Body Structures, Functions, Activity Limitations Requiring Skilled Therapeutic Intervention Decreased functional mobility ; Decreased balance;Decreased sensation   Assessment Pt tolerated increased amb distance well, cont to be slightly unsteady with no AD but no LOB noted. Pt up to recliner with all needs in reach and breakfast set up.    Activity Tolerance   Activity Tolerance Patient tolerated treatment well   PT Plan of Care   Tuesday X   Safety Devices   Type of Devices Call light within reach;Gait belt;Left in chair         Electronically signed by Kendal Villela PTA on 5/24/2022 at 8:40 AM

## 2022-05-24 NOTE — DISCHARGE INSTR - DIET
Good nutrition is important when healing from an illness, injury, or surgery. Follow any nutrition recommendations given to you during your hospital stay. If you were given an oral nutrition supplement while in the hospital, continue to take this supplement at home. You can take it with meals, in-between meals, and/or before bedtime. These supplements can be purchased at most local grocery stores, pharmacies, and chain [employer]-stores. If you have any questions about your diet or nutrition, call the hospital and ask for the dietitian.     Regular no added salt diet

## 2022-05-25 ENCOUNTER — CARE COORDINATION (OUTPATIENT)
Dept: CASE MANAGEMENT | Age: 72
End: 2022-05-25

## 2022-05-25 NOTE — CARE COORDINATION
Baut[facility] Transitions Initial Follow Up Call    Call within 2 business days of discharge: Yes    Patient: Cristina Barclay Patient : 1950   MRN: [id number]  Reason for Admission: TIA  Discharge Date: 22 RARS: No data recorded    Last Discharge [facility]       Complaint Diagnosis Description Type Department Provider    22 Numbness TIA (transient ischemic attack) . .. ED to Hosp-Admission (Discharged) (ADMITTED) L 5 SURG Trey Shelley MD; Lynne Santoyo... Spoke with: N/A    Facility: [address] [place] [address]    Non-face-to-face services provided:  Reviewed encounter information for continuity of care prior to follow up Care Transitions Coordination phone call - chart notes, consults, progress notes, test results, med list, appointments, AVS, other information. Care Transitions 24 Hour Call    Do you have all of your prescriptions and are they filled?: Yes  Post Acute Services: Home Health  Care Transitions Interventions         Follow Up  No future appointments. Attempted to make contact with patient/caregiver for an initial Care Transitions Coordination follow up call post discharge from the hospital without success. Unable to leave a message regarding intent of call and call back information. Line was busy. CTN will follow up again at a later time. Any previously scheduled hospital follow up appointments noted below.       Candice Reynoso RN

## 2022-05-25 NOTE — CARE COORDINATION
easily from almost any activity. She said she has been that way from the beginning of all of this. She said that her blood pressure has been stable, her blood sugar as well. Kellen Sanford it was 130 this am.  She does not take anything for it. She said she is going to talk to her PCP when she sees him for follo wup on Friday and see if she needs to be on something. She has started on Lipitor and Plavix. Discussed purposes, side effects, etc.  She has all of her meds and review completed. She is aware of her follow up appointments. She is not aware of any of issues. Did briefly discuss COVID 19 information. Did have patient for CTC back when she had COVID 19 in the past and was hospitalized. She said she finally recovered well without any issues. No other problems reported. Informed/reminded of Care Transitions Coordination process, purpose, future calls, etc and is agreeable with appropriate follow up. Ensured to have CTN contact information to be used as needed. Encouraged to call for new/ongoing issues, questions, concerns, etc.  Encouraged to make contact with PCP as indicated for any further needs as well. Given the opportunity to ask questions and answered appropriately. CTN to follow up as indicated. Transitions of Care Initial Call    Was this an external facility discharge? No    Challenges to be reviewed by the provider   Additional needs identified to be addressed with provider: No       Method of communication with provider : none    Advance Care Planning:   Does patient have an Advance Directive: not on file; education provided. Advance Care Planning   Healthcare Decision Maker:    Primary Decision Maker: Caden Muñoz Spouse - [phone]    Secondary Decision Maker: Bg Andres - Child - [phone]    Secondary Decision Maker: Be Centeno) - Child - [phone]    Care Transition Nurse contacted the patient by telephone to perform post hospital discharge assessment. Verified name and  with patient as identifiers. Provided introduction to self, and explanation of the CTN role. CTN reviewed discharge instructions, medical action plan and red flags with patient who verbalized understanding. Patient given an opportunity to ask questions and does not have any further questions or concerns at this time. Were discharge instructions available to patient? Yes. Reviewed appropriate site of care based on symptoms and resources available to patient including: PCP  Specialist  Urgent care clinics  [facility]   When to call [address] The patient agrees to contact the PCP office for questions related to their healthcare. Medication reconciliation was performed with patient, who verbalizes understanding of administration of home medications. Advised obtaining a 90-day supply of all daily and as-needed medications. CTN provided contact information. Plan for follow-up call in 5-7 days based on severity of symptoms and risk factors.   Plan for next call: - Plan for next call - assess overall status,  abnormal signs and symptoms, availability and effectiveness of medications, activity level and tolerance, falls/other unexpected events, findings from follow up appointments, seeking medical attention, who/when to call prn any needs, etc.    Dejuan Ernandez RN

## 2022-06-01 ENCOUNTER — CARE COORDINATION (OUTPATIENT)
Dept: CASE MANAGEMENT | Age: 72
End: 2022-06-01

## 2022-06-01 NOTE — CARE COORDINATION
[facility] Transitions Follow Up Call    2022    Patient: Mack Lugo  Patient : 1950   MRN: [id number]  Reason for Admission: TIA  Discharge Date: 22 RARS: No data recorded       Spoke with: Huong    Conversation:  Spoke to pt after 2 IDs. She is doing well. Her stamina is not there quite yet but it is improving. She states she tires easily. She did go grocery shopping with her spouse today. He drives and she does not. She is able to shower and walk. She tires but no deficits in her ADLS. She saw NP at Dr Rollins's office and she was happy with her progress. She went to a cookout at her daughters house on Monday and yesterday able to clean BR. She is wearing her Heart monitor until  and has the instructions to mail monitor. She will see Dr Sue Franco practice in 6 weeks to review results. They are unsure what caused symptoms and she currently does not have cardiologist. Educated that Dr Job Butterfield would recommend one for her if she needs. Reviewed lipitor and plavix for timing and purpose and need to report blood thinner. Reviewed FAST with her and educated to seek help with suspected stroke. She had covid lst year and 2 doses of vaccine. Agreeable to calls. Reviewed meds with pt    Care Transitions Follow Up Call    Needs to be reviewed by the provider   Additional needs identified to be addressed with provider: No  none             Method of communication with provider : none      Care Transition Nurse contacted the patient by telephone to follow up after admission on . Verified name and  with patient as identifiers. Addressed changes since last contact: tele monitoring-educated her about the heart monitor  Discussed follow-up appointments. If no appointment was previously scheduled, appointment scheduling offered: Yes. Is follow up appointment scheduled within 7 days of discharge? Yes. Advance Care Planning:   Does patient have an Advance Directive: not on file.      CTN reviewed discharge instructions, medical action plan and red flags with patient and discussed any barriers to care and/or understanding of plan of care after discharge. Discussed appropriate site of care based on symptoms and resources available to patient including: PCP. The patient agrees to contact the PCP office for questions related to their healthcare. Patients top risk factors for readmission: medical condition-stroke potential  Interventions to address risk factors: Education of patient/family/caregiver/guardian to support self-management-FAST for stroke      Non-[facility] follow up appointment(s):     CTN provided contact information for future needs. Plan for follow-up call in 7-10 days based on severity of symptoms and risk factors. Plan for next call: symptom management-stoke s/s, medications, assess overall status,  effectiveness of medications, activity level and tolerance, falls/other unexpected events, findings from follow up appointments, seeking medical attention, who/when to call prn any needs, etc.                Care Transitions Subsequent and Final Call    Subsequent and Final Calls  Care Transitions Interventions  Other Interventions: Follow Up  No future appointments.     MARIANA Leal, RN   [address] [place] [address] Transition Nurse  [phone] Attending Attestation (For Attendings USE Only)...

## 2022-06-07 ENCOUNTER — CARE COORDINATION (OUTPATIENT)
Dept: CASE MANAGEMENT | Age: 72
End: 2022-06-07

## 2022-06-07 NOTE — CARE COORDINATION
[facility] Transitions Follow Up Call    2022    Patient: Ambreen Hernandez  Patient : 1950   MRN: [id number]    Discharge Date: 22 RARS: No data recorded       Spoke with: N/A    Care Transitions Subsequent and Final Call    Subsequent and Final Calls  Are you currently active with any services?: Home Health  Care Transitions Interventions  Other Interventions: Follow Up  No future appointments. Attempted to make contact with patient/caregiver for a routine Care Transitions Coordination follow up call without success. Unable to leave a HIPAA compliant message regarding intent of call and call back information. Line was busy x 2 attempts. CTN will try again at another time.     Mohan Sue RN

## 2022-06-10 ENCOUNTER — CARE COORDINATION (OUTPATIENT)
Dept: CASE MANAGEMENT | Age: 72
End: 2022-06-10

## 2022-06-10 NOTE — CARE COORDINATION
[facility] Transitions Follow Up Call    6/10/2022    Patient: Ritika Cartwright  Patient : 1950   MRN: [id number]    Discharge Date: 22 RARS: No data recorded       Spoke with: [facility] Transitions Subsequent and Final Call    Schedule Follow Up Appointment with PCP: Completed  Subsequent and Final Calls  Have your medications changed?: No  Do you have any questions related to your medications?: No  Are you currently active with any services?: Home Health  Do you have any needs or concerns that I can assist you with?: No  Identified Barriers: None  Care Transitions Interventions  Other Interventions: Follow Up  No future appointments. Spoke with patient for a follow up call. She reported that she had a bad day yesterday. She said she felt weak and tired nearly all day, didn't have much energy at all. She said that her blood pressure has been running low and she thinks that has had a lot to do with it. She said it was running in the 20B systolic and is in the 749-210 range today, a little better and she feels a little better. She said she is drinking plenty of fluids. She said she drinks 3 bottles of water in addition to whatever else she drinks. She said that she does not think that has anything to do with it. She said that she feels she is sleeping well. She thinks it is just good days and bad days. She has mailed her Zio patch back in but has not heard from the results as yet. Denied any chest pain, dizziness, other symptoms. Eating well, appetite is good. Has all of her meds, taking as ordered. Will follow in a few days as indicated.       Andres Linder RN

## 2022-06-13 PROCEDURE — 93248 EXT ECG>7D<15D REV&INTERPJ: CPT | Performed by: INTERNAL MEDICINE

## 2022-06-13 NOTE — PROCEDURES
[facility] REPORT      Erasmo Posada  [id number]    : 1950      Indications: Palpitation      Test Date: May 24, 2022    Analysis Date: 2022    Date Interpreted: 2022        IMPRESSION:      1. Nearly 13 days 12 hours of rhythm are processed. 2.  The underlying rhythm is sinus rhythm between 47 and 129 bpm with average heart rate of 67 bpm.    3.  The were no extra supraventricular beats. 4.  The were occasional extra ventricular beats. They occurred singly and as ventricular bigeminy.     5.  Symptoms: Triggered events            Electronically signed by Rose Vicente MD on 22

## 2022-06-14 ENCOUNTER — CARE COORDINATION (OUTPATIENT)
Dept: CASE MANAGEMENT | Age: 72
End: 2022-06-14

## 2022-06-14 NOTE — CARE COORDINATION
[facility] Transitions Follow Up Call    2022    Patient: Noman Bartlett  Patient : 1950   MRN: [id number]    Discharge Date: 22 RARS: No data recorded       Spoke with: [address] [employer] [address] [employer]  Transitions Subsequent and Final Call    Schedule Follow Up Appointment with PCP: Completed  Subsequent and Final Calls  Do you have any ongoing symptoms?: No  Have your medications changed?: No  Do you have any questions related to your medications?: No  Do you currently have any active services?: No  Are you currently active with any services?: Home Health  Do you have any needs or concerns that I can assist you with?: No  Identified Barriers: None  Care Transitions Interventions  Other Interventions: Follow Up  No future appointments. Spoke with patient for a follow up Care Transitions Coordination call. She reported that she has been having some better days, today being the best yet. She said her blood pressure is stabilizing a little better, not where it should be, but is better. She said that they had cut her cholesterol pill in half as well and she believe that is helping how she feels too. She denied any palpitations, chest pain, shortness of breath, other issues. She is resting, sleeping better. She is eating and drinking well. She isaid she and her  went out early this am for groceries before it started getting hot and she doesn't do other outdoor activities until after the sun sets, such as watering her plants, etc.  She has not heard from her Zio patch report as yet, but hopes to soon. No other issues at this time. Will follow up in a few days. Patient to call prn any needs.       Chava Garcia RN

## 2022-06-21 ENCOUNTER — CARE COORDINATION (OUTPATIENT)
Dept: CASE MANAGEMENT | Age: 72
End: 2022-06-21

## 2022-06-21 NOTE — CARE COORDINATION
[facility] Transitions Follow Up Call    2022    Patient: Turner Novoa  Patient : 1950   MRN: [id number]   Discharge Date: 22 RARS: No data recorded       Spoke with: [facility] Transitions Subsequent and Final Call    Schedule Follow Up Appointment with PCP: Completed  Subsequent and Final Calls  Have your medications changed?: No  Do you have any questions related to your medications?: No  Do you currently have any active services?: No  Are you currently active with any services?: Home Health  Do you have any needs or concerns that I can assist you with?: No  Identified Barriers: None  Care Transitions Interventions  Other Interventions: Follow Up  No future appointments. Spoke with patient for a follow up Care Transitions Coordination call. She reported that she may be a little better than when we last talked. She said she is still quite weak and tires out so easily. She said she has decided that it is just something that is going to take time, for her to build some endurance back and to recover neurologically. She said she has been having better blood pressure readings. It is running around 120s for the most part in the mornings. If it is very low, she holds her blood pressure meds. She said she has once or twice since we last talked. She said it usually goes up in the evenings. She is eating well, drinking well. She said she is tolerating activity well for the most part. She is doing household chores and drove herself to [employer] on . She has not heard from the zio patch yet. She goes to the doctor soon and hopes to hear then for sure. No new issues reported. CTN to sign off at this time.     Vidal Gleason RN

## 2022-12-04 NOTE — PROGRESS NOTES
YOB: 1950  Location: [facility]  Location Address: [address] [place] [address], [facility] [address], [address] [place] [address]  Location Phone: [phone]    Chief Complaint   Patient presents with   • Sinus Problem       History of Present Illness  Judi Garcia is a 67 y.o. female.  Judi Garcia is here for follow up of ENT complaints. The patient has had problems with excess mucous in throat, throat drainage, nasal congestion. The symptoms are not localized to a particular location. The patient has had moderate symptoms. The symptoms have been present for the last several months The symptoms are aggravated by  nasal congestion. The symptoms are improved by no identifiable factors.       Past Medical History:   Diagnosis Date   • Allergic    • Calculus of ureter    • Diabetes mellitus, type II    • Gross hematuria    • Hemangioma    • Hyperlipemia    • Microscopic hematuria        Past Surgical History:   Procedure Laterality Date   • CAROTID ENDARTERECTOMY Left    • CYSTOSCOPY W/ LITHOLAPAXY / EHL     • HYSTERECTOMY     • OOPHORECTOMY Bilateral    • PAROTIDECTOMY      Dr. Rodriguez   • TUBAL ABDOMINAL LIGATION           Current Outpatient Prescriptions:   •  aspirin 81 MG EC tablet, Take 81 mg by mouth Daily., Disp: , Rfl:   •  bisoprolol-hydrochlorothiazide (ZIAC) 5-6.25 MG per tablet, , Disp: , Rfl:   •  Calcium Carbonate (CALCIUM 600 PO), Take  by mouth., Disp: , Rfl:   •  Cholecalciferol (VITAMIN D3) 5000 UNITS capsule capsule, Take 5,000 Units by mouth Daily., Disp: , Rfl:   •  fexofenadine (ALLEGRA) 180 MG tablet, Take 180 mg by mouth Daily., Disp: , Rfl:   •  fluticasone (FLONASE) 50 MCG/ACT nasal spray, 1 spray each nostril twice a day for three days, then daily, Disp: 1 bottle, Rfl: 0  •  meclizine 25 MG chewable tablet chewable tablet, Chew 25 mg 3 (Three) Times a Day As Needed., Disp: , Rfl:   •  niacin 500 MG tablet, Take 500 mg by mouth Every Night., Disp: , Rfl:   •  Omega-3 Fatty Acids  (FISH OIL) 1000 MG capsule capsule, Take 600 mg by mouth Daily With Breakfast., Disp: , Rfl:   •  Probiotic Product (PROBIOTIC DAILY PO), Take  by mouth., Disp: , Rfl:   •  azelastine (ASTELIN) 0.1 % nasal spray, 2 sprays into each nostril 2 (Two) Times a Day. Use in each nostril as directed, Disp: 30 mL, Rfl: 5  •  montelukast (SINGULAIR) 10 MG tablet, Take 1 tablet by mouth Daily for 30 days. 1 by mouth every day, Disp: 30 tablet, Rfl: 5    Biaxin [clarithromycin]; Fosamax [alendronate]; and Codeine    Family History   Problem Relation Age of Onset   • No Known Problems Father    • No Known Problems Mother        Social History     Social History   • Marital status: Unknown     Spouse name: N/A   • Number of children: N/A   • Years of education: N/A     Occupational History   • Not on file.     Social History Main Topics   • Smoking status: Never Smoker   • Smokeless tobacco: Never Used   • Alcohol use No   • Drug use: Not on file   • Sexual activity: Not on file     Other Topics Concern   • Not on file     Social History Narrative       Review of Systems   Constitutional: Negative.    HENT:        SEE HPI   Eyes: Negative.    Respiratory: Negative.    Cardiovascular: Negative.    Gastrointestinal: Negative.    Endocrine: Negative.    Genitourinary: Negative.    Musculoskeletal: Negative.    Skin: Negative.    Allergic/Immunologic: Negative.    Neurological: Negative.    Hematological: Negative.    Psychiatric/Behavioral: Negative.        Vitals:    08/17/17 1255   BP: 140/72   Pulse: 86   Temp: 98.8 °F (37.1 °C)       Objective     Physical Exam  CONSTITUTIONAL: well nourished, alert, oriented, in no acute distress     COMMUNICATION AND VOICE: able to communicate normally, normal voice quality    HEAD: normocephalic, no lesions, atraumatic, no tenderness, no masses     FACE: appearance normal, no lesions, no tenderness, no deformities, facial motion symmetric    SALIVARY GLANDS: parotid glands with no tenderness,  no swelling, no masses, submandibular glands with normal size, nontender    EYES: ocular motility normal, eyelids normal, orbits normal, no proptosis, conjunctiva normal , pupils equal, round     EARS:  Hearing: response to conversational voice normal bilaterally   External Ears: auricles without lesions  Otoscopic: tympanic membrane appearance normal, no lesions, no perforation, normal mobility, no fluid    NOSE:  External Nose: structure normal, no tenderness on palpation, no nasal discharge, no lesions, no evidence of trauma, nostrils patent   Intranasal Exam: nasal mucosa edema, vestibule within normal limits, inferior turbinate hypertrophy, nasal septum midline     ORAL:  Lips: upper and lower lips without lesion   Teeth: dentition within normal limits for age   Gums: gingivae healthy   Oral Mucosa: oral mucosa normal, no mucosal lesions   Floor of Mouth: Warthin’s duct patent, mucosa normal  Tongue: lingual mucosa normal without lesions, normal tongue mobility   Palate: soft and hard palates with normal mucosa and structure  Oropharynx: left tonsil venous lake     NECK: neck appearance normal, no mass,  noted without erythema or tenderness    THYROID: no overt thyromegaly, no tenderness, nodules or mass present on palpation, position midline     LYMPH NODES: no lymphadenopathy    CHEST/RESPIRATORY: respiratory effort normal    CARDIOVASCULAR: extremities without cyanosis or edema      NEUROLOGIC/PSYCHIATRIC: oriented to time, place and person, mood normal, affect appropriate, CN II-XII intact grossly    Assessment/Plan   Judi was seen today for sinus problem.    Diagnoses and all orders for this visit:    Hypertrophy of inferior nasal turbinate    Benign neoplasm of tonsil    Allergic rhinitis, unspecified allergic rhinitis trigger, unspecified rhinitis seasonality    Other orders  -     montelukast (SINGULAIR) 10 MG tablet; Take 1 tablet by mouth Daily for 30 days. 1 by mouth every day  -     azelastine  (ASTELIN) 0.1 % nasal spray; 2 sprays into each nostril 2 (Two) Times a Day. Use in each nostril as directed      * Surgery not found *  No orders of the defined types were placed in this encounter.    Return in about 6 months (around 2/17/2018).       Patient Instructions   Call for problems or worsening symptoms    May use Singulair instead of Allegra, add Astelin         Alert-The patient is alert, awake and responds to voice. The patient is oriented to time, place, and person. The triage nurse is able to obtain subjective information.

## 2024-08-12 ENCOUNTER — OFFICE VISIT (OUTPATIENT)
Dept: GASTROENTEROLOGY | Facility: CLINIC | Age: 74
End: 2024-08-12
Payer: MEDICARE

## 2024-08-12 VITALS
HEART RATE: 74 BPM | DIASTOLIC BLOOD PRESSURE: 84 MMHG | WEIGHT: 175 LBS | SYSTOLIC BLOOD PRESSURE: 136 MMHG | OXYGEN SATURATION: 95 % | BODY MASS INDEX: 32.2 KG/M2 | HEIGHT: 62 IN | TEMPERATURE: 98 F

## 2024-08-12 DIAGNOSIS — Z79.01 ANTICOAGULATED: ICD-10-CM

## 2024-08-12 DIAGNOSIS — Z80.0 FAMILY HISTORY OF COLON CANCER: Primary | ICD-10-CM

## 2024-08-12 DIAGNOSIS — Z86.010 HISTORY OF COLON POLYPS: ICD-10-CM

## 2024-08-12 PROCEDURE — S0260 H&P FOR SURGERY: HCPCS | Performed by: NURSE PRACTITIONER

## 2024-08-12 PROCEDURE — 1159F MED LIST DOCD IN RCRD: CPT | Performed by: NURSE PRACTITIONER

## 2024-08-12 PROCEDURE — 1160F RVW MEDS BY RX/DR IN RCRD: CPT | Performed by: NURSE PRACTITIONER

## 2024-08-12 RX ORDER — GARLIC 180 MG
TABLET, DELAYED RELEASE (ENTERIC COATED) ORAL
COMMUNITY

## 2024-08-12 RX ORDER — MULTIPLE VITAMINS W/ MINERALS TAB 9MG-400MCG
1 TAB ORAL DAILY
COMMUNITY

## 2024-08-12 RX ORDER — OMEPRAZOLE 20 MG/1
1 CAPSULE, DELAYED RELEASE ORAL DAILY
COMMUNITY
Start: 2024-07-30

## 2024-08-12 RX ORDER — CLOPIDOGREL BISULFATE 75 MG/1
1 TABLET ORAL DAILY
COMMUNITY
Start: 2024-07-29

## 2024-08-12 RX ORDER — AMPICILLIN TRIHYDRATE 250 MG
500 CAPSULE ORAL DAILY
COMMUNITY

## 2024-08-12 RX ORDER — KRILL/OM-3/DHA/EPA/PHOSPHO/AST 500MG-86MG
CAPSULE ORAL
COMMUNITY

## 2024-08-12 NOTE — PROGRESS NOTES
"Chief Complaint   Patient presents with    Colonoscopy     9-19-19 colon polyps 5 year recall       PCP: Gm Cortes MD  REFER: No ref. provider found    Subjective     HPI    Judi Garcia is a 74 y.o. female who presents to office for preventative maintenance.  There is  a personal history of colon polyps.   She does not have complaints of nausea/vomiting, change in bowels, weight loss, no BRBPR, no melena.  There is (mother) a family history of colon cancer in first degree relative.  There is (mother) a family history of colon polyps in first degree relative.  Judi Garcia last colonoscopy-2019 .  Bowels do move on regular basis.  She reports occasional constipation, this is not new.      COLONOSCOPY (09/19/2019 07:30) -rectal polyp  SCANNED - COLONOSCOPY (09/05/2014 00:00)   SCANNED - COLONOSCOPY (09/01/2011 00:00)   SCANNED - COLONOSCOPY (09/30/2008 00:00)     No results for input(s): \"GLUCOSE\", \"NA\", \"K\", \"CREATININE\", \"BUN\", \"BCR\", \"ALKPHOS\", \"ALT\", \"AST\", \"BILITOT\", \"ALBUMIN\" in the last 54337 hours.    No results for input(s): \"EGFRIFNONA\", \"EGFRIFAFRI\", \"EGFRRESULT\" in the last 44364 hours.     Past Medical History:   Diagnosis Date    Allergic     Calculus of ureter     Diabetes mellitus, type II     Gross hematuria     Hemangioma     Hyperlipemia     Microscopic hematuria      Past Surgical History:   Procedure Laterality Date    CAROTID ENDARTERECTOMY Left 2012    COLONOSCOPY  09/05/2014    diverticulosis    COLONOSCOPY N/A 9/19/2019    Procedure: COLONOSCOPY WITH ANESTHESIA;  Surgeon: Jose Finley DO;  Location: [facility] ENDOSCOPY;  Service: Gastroenterology    CYSTOSCOPY W/ LITHOLAPAXY / EHL      HYSTERECTOMY      OOPHORECTOMY Bilateral 2014    PAROTIDECTOMY  2012    Dr. Rodriguez    TUBAL ABDOMINAL LIGATION       Outpatient Medications Marked as Taking for the 8/12/24 encounter (Office Visit) with Ron Salcido APRN   Medication Sig Dispense Refill    aspirin 81 MG EC " tablet Take 1 tablet by mouth Daily.      azelastine (ASTELIN) 0.1 % nasal spray 2 sprays into each nostril 2 (Two) Times a Day. Use in each nostril as directed 30 mL 5    bisoprolol-hydrochlorothiazide (ZIAC) 5-6.25 MG per tablet       Calcium Carbonate (CALCIUM 600 PO) Take  by mouth.      Cholecalciferol (VITAMIN D3) 5000 UNITS capsule capsule Take 1 capsule by mouth Daily.      Cinnamon 500 MG capsule Take 1 capsule by mouth Daily.      clopidogrel (PLAVIX) 75 MG tablet Take 1 tablet by mouth Daily.      fexofenadine (ALLEGRA) 180 MG tablet Take 1 tablet by mouth Daily.      fluticasone (FLONASE) 50 MCG/ACT nasal spray 1 spray each nostril twice a day for three days, then daily 1 bottle 0    Ginkgo Biloba 60 MG capsule Take  by mouth.      Krill Oil 500 MG capsule Take  by mouth.      melatonin 5 MG tablet tablet Take  by mouth.      multivitamin with minerals tablet tablet Take 1 tablet by mouth Daily.      niacin 500 MG tablet Take 1 tablet by mouth Every Night.      omeprazole (priLOSEC) 20 MG capsule Take 1 capsule by mouth Daily.      Probiotic Product (PROBIOTIC DAILY PO) Take  by mouth.       Allergies   Allergen Reactions    Biaxin [Clarithromycin]      Throat swelling        Fosamax [Alendronate]      Throat Swelling    Codeine      Headache    Statins Other (See Comments)     Muscle aches     Social History     Socioeconomic History    Marital status:    Tobacco Use    Smoking status: Never    Smokeless tobacco: Never   Vaping Use    Vaping status: Never Used   Substance and Sexual Activity    Alcohol use: No    Drug use: No     Review of Systems   Constitutional:  Negative for fever and unexpected weight change.   HENT:  Negative for trouble swallowing.    Respiratory:  Negative for shortness of breath.    Cardiovascular:  Negative for chest pain.   Gastrointestinal:  Negative for abdominal pain and anal bleeding.     Objective   Vitals:    08/12/24 1248   BP: 136/84   Pulse: 74   Temp: 98 °F  (36.7 °C)   SpO2: 95%     Physical Exam  Constitutional:       Appearance: Normal appearance. She is well-developed.   Eyes:      General: No scleral icterus.  Cardiovascular:      Heart sounds: Normal heart sounds. No murmur heard.  Pulmonary:      Effort: Pulmonary effort is normal.   Abdominal:      General: Bowel sounds are normal. There is no distension.      Palpations: Abdomen is soft.      Tenderness: There is no abdominal tenderness. There is no guarding.   Skin:     General: Skin is warm and dry.      Coloration: Skin is not jaundiced.   Neurological:      Mental Status: She is alert.   Psychiatric:         Behavior: Behavior is cooperative.       Imaging Results (Most Recent)       None          Body mass index is 32.01 kg/m².    Assessment & Plan   Diagnoses and all orders for this visit:    1. Family history of colon cancer (Primary)  Comments:  mother    2. History of colon polyps  -     Case Request; Standing  -     Implement Anesthesia Orders Day of Procedure; Standing  -     Obtain Informed Consent; Standing  -     Case Request    3. Anticoagulated    Other orders  -     polyethylene glycol (GoLYTELY) 236 g solution; Take 3,785 mL by mouth 1 (One) Time for 1 dose. Take as directed  Dispense: 3350 mL; Refill: 0      COLONOSCOPY WITH ANESTHESIA (N/A)        Advised pt to stop use of NSAIDs, Fish Oil, and MV 5 days prior to procedure, per Dr Finley protocol.  Tylenol based products are ok to take.  Pt verbalized understanding.     All risks, benefits, alternatives, and indications of colonoscopy procedure have been discussed with the patient. Risks to include perforation of the colon requiring possible surgery or colostomy, risk of bleeding from biopsies or removal of colon tissue, possibility of missing a colon polyp or cancer, or adverse drug reaction.  Benefits to include the diagnosis and management of disease of the colon and rectum. Alternatives to include barium enema, radiographic evaluation,  lab testing or no intervention. She verbalizes understanding and agrees.     Patient is to hold their anticoagulation medication per the direction of their prescribing provider,  Gm Cortes MD . This is to prevent any risk or complication from bleeding intra and post procedure. If they develop bleeding post procedure they are to go the emergency department for further evaluation and treatment immediately.  We will obtain clearance from prescribing provider requesting Plavix will need to be held x 5 days prior to procedure.     I have explained having an adequate and complete prep is associated with success of colonoscopy.   I have explained to Judi Garcia that not having an adequate bowel prep can lead to decreased rate of  adenoma detection, longer procedure times, and higher chance the physician will not be able to successfully complete full colonoscopy (able to intubate cecum).   I have discussed bowel prep options miralax prep, suprep, and Golytley.  Judi Garcia has elected to proceed with Golytley.   I have also discussed that there are a variety of prep options however prep that is prescribed is influenced on patient health history, how well bowels move on regular basis, and individualized insurance plan.  I have explained that we are not able to know the amount of coverage each individual insurance plan provides for preps.  I asked Judi Garcia to please call our office if we need to send prescription/provide instructions for another prep due to costs/insurance coverage.          Ron Salcido, APRN  08/12/24        There are no Patient Instructions on file for this visit.

## 2024-08-12 NOTE — H&P (VIEW-ONLY)
"Chief Complaint   Patient presents with    Colonoscopy     9-19-19 colon polyps 5 year recall       PCP: Gm Cortes MD  REFER: No ref. provider found    Subjective     HPI    Judi Garcia is a 74 y.o. female who presents to office for preventative maintenance.  There is  a personal history of colon polyps.   She does not have complaints of nausea/vomiting, change in bowels, weight loss, no BRBPR, no melena.  There is (mother) a family history of colon cancer in first degree relative.  There is (mother) a family history of colon polyps in first degree relative.  Judi Garcia last colonoscopy-2019 .  Bowels do move on regular basis.  She reports occasional constipation, this is not new.      COLONOSCOPY (09/19/2019 07:30) -rectal polyp  SCANNED - COLONOSCOPY (09/05/2014 00:00)   SCANNED - COLONOSCOPY (09/01/2011 00:00)   SCANNED - COLONOSCOPY (09/30/2008 00:00)     No results for input(s): \"GLUCOSE\", \"NA\", \"K\", \"CREATININE\", \"BUN\", \"BCR\", \"ALKPHOS\", \"ALT\", \"AST\", \"BILITOT\", \"ALBUMIN\" in the last 29769 hours.    No results for input(s): \"EGFRIFNONA\", \"EGFRIFAFRI\", \"EGFRRESULT\" in the last 81784 hours.     Past Medical History:   Diagnosis Date    Allergic     Calculus of ureter     Diabetes mellitus, type II     Gross hematuria     Hemangioma     Hyperlipemia     Microscopic hematuria      Past Surgical History:   Procedure Laterality Date    CAROTID ENDARTERECTOMY Left 2012    COLONOSCOPY  09/05/2014    diverticulosis    COLONOSCOPY N/A 9/19/2019    Procedure: COLONOSCOPY WITH ANESTHESIA;  Surgeon: Jose Finley DO;  Location: [facility] ENDOSCOPY;  Service: Gastroenterology    CYSTOSCOPY W/ LITHOLAPAXY / E[facility]      HYSTERECTOMY      OOPHORECTOMY Bilateral 2014    PAROTIDECTOMY  2012    Dr. Rodriguez    TUBAL ABDOMINAL LIGATION       Outpatient Medications Marked as Taking for the 8/12/24 encounter (Office Visit) with Ron Salcido APRN   Medication Sig Dispense Refill    aspirin 81 MG EC " tablet Take 1 tablet by mouth Daily.      azelastine (ASTELIN) 0.1 % nasal spray 2 sprays into each nostril 2 (Two) Times a Day. Use in each nostril as directed 30 mL 5    bisoprolol-hydrochlorothiazide (ZIAC) 5-6.25 MG per tablet       Calcium Carbonate (CALCIUM 600 PO) Take  by mouth.      Cholecalciferol (VITAMIN D3) 5000 UNITS capsule capsule Take 1 capsule by mouth Daily.      Cinnamon 500 MG capsule Take 1 capsule by mouth Daily.      clopidogrel (PLAVIX) 75 MG tablet Take 1 tablet by mouth Daily.      fexofenadine (ALLEGRA) 180 MG tablet Take 1 tablet by mouth Daily.      fluticasone (FLONASE) 50 MCG/ACT nasal spray 1 spray each nostril twice a day for three days, then daily 1 bottle 0    Ginkgo Biloba 60 MG capsule Take  by mouth.      Krill Oil 500 MG capsule Take  by mouth.      melatonin 5 MG tablet tablet Take  by mouth.      multivitamin with minerals tablet tablet Take 1 tablet by mouth Daily.      niacin 500 MG tablet Take 1 tablet by mouth Every Night.      omeprazole (priLOSEC) 20 MG capsule Take 1 capsule by mouth Daily.      Probiotic Product (PROBIOTIC DAILY PO) Take  by mouth.       Allergies   Allergen Reactions    Biaxin [Clarithromycin]      Throat swelling        Fosamax [Alendronate]      Throat Swelling    Codeine      Headache    Statins Other (See Comments)     Muscle aches     Social History     Socioeconomic History    Marital status:    Tobacco Use    Smoking status: Never    Smokeless tobacco: Never   Vaping Use    Vaping status: Never Used   Substance and Sexual Activity    Alcohol use: No    Drug use: No     Review of Systems   Constitutional:  Negative for fever and unexpected weight change.   HENT:  Negative for trouble swallowing.    Respiratory:  Negative for shortness of breath.    Cardiovascular:  Negative for chest pain.   Gastrointestinal:  Negative for abdominal pain and anal bleeding.     Objective   Vitals:    08/12/24 1248   BP: 136/84   Pulse: 74   Temp: 98 °F  (36.7 °C)   SpO2: 95%     Physical Exam  Constitutional:       Appearance: Normal appearance. She is well-developed.   Eyes:      General: No scleral icterus.  Cardiovascular:      Heart sounds: Normal heart sounds. No murmur heard.  Pulmonary:      Effort: Pulmonary effort is normal.   Abdominal:      General: Bowel sounds are normal. There is no distension.      Palpations: Abdomen is soft.      Tenderness: There is no abdominal tenderness. There is no guarding.   Skin:     General: Skin is warm and dry.      Coloration: Skin is not jaundiced.   Neurological:      Mental Status: She is alert.   Psychiatric:         Behavior: Behavior is cooperative.       Imaging Results (Most Recent)       None          Body mass index is 32.01 kg/m².    Assessment & Plan   Diagnoses and all orders for this visit:    1. Family history of colon cancer (Primary)  Comments:  mother    2. History of colon polyps  -     Case Request; Standing  -     Implement Anesthesia Orders Day of Procedure; Standing  -     Obtain Informed Consent; Standing  -     Case Request    3. Anticoagulated    Other orders  -     polyethylene glycol (GoLYTELY) 236 g solution; Take 3,785 mL by mouth 1 (One) Time for 1 dose. Take as directed  Dispense: 3350 mL; Refill: 0      COLONOSCOPY WITH ANESTHESIA (N/A)        Advised pt to stop use of NSAIDs, Fish Oil, and MV 5 days prior to procedure, per Dr Finley protocol.  Tylenol based products are ok to take.  Pt verbalized understanding.     All risks, benefits, alternatives, and indications of colonoscopy procedure have been discussed with the patient. Risks to include perforation of the colon requiring possible surgery or colostomy, risk of bleeding from biopsies or removal of colon tissue, possibility of missing a colon polyp or cancer, or adverse drug reaction.  Benefits to include the diagnosis and management of disease of the colon and rectum. Alternatives to include barium enema, radiographic evaluation,  lab testing or no intervention. She verbalizes understanding and agrees.     Patient is to hold their anticoagulation medication per the direction of their prescribing provider,  Gm Cortes MD . This is to prevent any risk or complication from bleeding intra and post procedure. If they develop bleeding post procedure they are to go the emergency department for further evaluation and treatment immediately.  We will obtain clearance from prescribing provider requesting Plavix will need to be held x 5 days prior to procedure.     I have explained having an adequate and complete prep is associated with success of colonoscopy.   I have explained to Judi Garcia that not having an adequate bowel prep can lead to decreased rate of  adenoma detection, longer procedure times, and higher chance the physician will not be able to successfully complete full colonoscopy (able to intubate cecum).   I have discussed bowel prep options miralax prep, suprep, and Golytley.  Judi Garcia has elected to proceed with Golytley.   I have also discussed that there are a variety of prep options however prep that is prescribed is influenced on patient health history, how well bowels move on regular basis, and individualized insurance plan.  I have explained that we are not able to know the amount of coverage each individual insurance plan provides for preps.  I asked Judi Garcia to please call our office if we need to send prescription/provide instructions for another prep due to costs/insurance coverage.          Ron Salcido, APRN  08/12/24        There are no Patient Instructions on file for this visit.

## 2024-08-27 ENCOUNTER — TELEPHONE (OUTPATIENT)
Dept: GASTROENTEROLOGY | Facility: CLINIC | Age: 74
End: 2024-08-27
Payer: MEDICARE

## 2024-09-03 ENCOUNTER — ANESTHESIA (OUTPATIENT)
Dept: GASTROENTEROLOGY | Facility: HOSPITAL | Age: 74
End: 2024-09-03
Payer: MEDICARE

## 2024-09-03 ENCOUNTER — HOSPITAL ENCOUNTER (OUTPATIENT)
Facility: HOSPITAL | Age: 74
Setting detail: HOSPITAL OUTPATIENT SURGERY
Discharge: HOME | End: 2024-09-03
Attending: INTERNAL MEDICINE | Admitting: INTERNAL MEDICINE
Payer: MEDICARE

## 2024-09-03 ENCOUNTER — ANESTHESIA EVENT (OUTPATIENT)
Dept: GASTROENTEROLOGY | Facility: HOSPITAL | Age: 74
End: 2024-09-03
Payer: MEDICARE

## 2024-09-03 VITALS
HEART RATE: 63 BPM | BODY MASS INDEX: 30.91 KG/M2 | OXYGEN SATURATION: 95 % | HEIGHT: 62 IN | TEMPERATURE: 98.8 F | SYSTOLIC BLOOD PRESSURE: 120 MMHG | RESPIRATION RATE: 18 BRPM | DIASTOLIC BLOOD PRESSURE: 64 MMHG | WEIGHT: 168 LBS

## 2024-09-03 DIAGNOSIS — Z86.010 HISTORY OF COLON POLYPS: ICD-10-CM

## 2024-09-03 PROCEDURE — 45385 COLONOSCOPY W/LESION REMOVAL: CPT | Performed by: INTERNAL MEDICINE

## 2024-09-03 PROCEDURE — 25810000003 SODIUM CHLORIDE 0.9 % SOLUTION: Performed by: ANESTHESIOLOGY

## 2024-09-03 PROCEDURE — 25010000002 PROPOFOL 10 MG/ML EMULSION: Performed by: NURSE ANESTHETIST, CERTIFIED REGISTERED

## 2024-09-03 RX ORDER — SODIUM CHLORIDE 9 MG/ML
40 INJECTION, SOLUTION INTRAVENOUS AS NEEDED
Status: CANCELLED | OUTPATIENT
Start: 2024-09-03

## 2024-09-03 RX ORDER — SODIUM CHLORIDE 0.9 % (FLUSH) 0.9 %
10 SYRINGE (ML) INJECTION AS NEEDED
Status: DISCONTINUED | OUTPATIENT
Start: 2024-09-03 | End: 2024-09-03 | Stop reason: HOSPADM

## 2024-09-03 RX ORDER — SODIUM CHLORIDE 9 MG/ML
100 INJECTION, SOLUTION INTRAVENOUS CONTINUOUS
Status: DISCONTINUED | OUTPATIENT
Start: 2024-09-03 | End: 2024-09-03 | Stop reason: HOSPADM

## 2024-09-03 RX ORDER — SODIUM CHLORIDE 0.9 % (FLUSH) 0.9 %
10 SYRINGE (ML) INJECTION EVERY 12 HOURS SCHEDULED
Status: DISCONTINUED | OUTPATIENT
Start: 2024-09-03 | End: 2024-09-03 | Stop reason: HOSPADM

## 2024-09-03 RX ORDER — LIDOCAINE HYDROCHLORIDE 20 MG/ML
INJECTION, SOLUTION EPIDURAL; INFILTRATION; INTRACAUDAL; PERINEURAL AS NEEDED
Status: DISCONTINUED | OUTPATIENT
Start: 2024-09-03 | End: 2024-09-03 | Stop reason: SURG

## 2024-09-03 RX ORDER — PROPOFOL 10 MG/ML
VIAL (ML) INTRAVENOUS AS NEEDED
Status: DISCONTINUED | OUTPATIENT
Start: 2024-09-03 | End: 2024-09-03 | Stop reason: SURG

## 2024-09-03 RX ADMIN — SODIUM CHLORIDE 100 ML/HR: 9 INJECTION, SOLUTION INTRAVENOUS at 08:28

## 2024-09-03 RX ADMIN — LIDOCAINE HYDROCHLORIDE 100 MG: 20 INJECTION, SOLUTION EPIDURAL; INFILTRATION; INTRACAUDAL; PERINEURAL at 08:35

## 2024-09-03 RX ADMIN — PROPOFOL 170 MG: 10 INJECTION, EMULSION INTRAVENOUS at 08:35

## 2024-09-03 NOTE — ANESTHESIA POSTPROCEDURE EVALUATION
Patient: Judi Garcia    Procedure Summary       Date: 09/03/24 Room / Location:  [facility] /  [facility]    Anesthesia Start: 0833 Anesthesia Stop: 0850    Procedure: COLONOSCOPY WITH ANESTHESIA Diagnosis:       History of colon polyps      (History of colon polyps [Z86.010])    Surgeons: Jose Finley DO Provider: Kirstie Ace CRNA    Anesthesia Type: MAC ASA Status: 2            Anesthesia Type: MAC    Vitals  Vitals Value Taken Time   /64 09/03/24 0911   Temp     Pulse 58 09/03/24 0912   Resp 18 09/03/24 0905   SpO2 94 % 09/03/24 0912   Vitals shown include unfiled device data.        Post Anesthesia Care and Evaluation    Patient location during evaluation: PHASE II

## 2024-09-03 NOTE — ANESTHESIA PREPROCEDURE EVALUATION
Anesthesia Evaluation     no history of anesthetic complications:   NPO Solid Status: > 8 hours  NPO Liquid Status: > 2 hours           Airway   Mallampati: II  TM distance: >3 FB  Neck ROM: full  Dental    (+) partials and upper dentures        Pulmonary - normal exam    breath sounds clear to auscultation  (-) asthma, recent URI, sleep apnea, not a smoker  Cardiovascular - normal exam  Exercise tolerance: good (4-7 METS)    Rhythm: regular  Rate: normal    (+) hypertension, hyperlipidemia  (-) pacemaker, past MI, angina, cardiac stents, CABG      Neuro/Psych  (-) seizures, TIA, CVA  GI/Hepatic/Renal/Endo    (+) obesity, GERD, diabetes mellitus  (-) liver disease, no renal disease    Musculoskeletal     Abdominal    Substance History      OB/GYN          Other                          Anesthesia Plan    ASA 2     MAC     intravenous induction     Anesthetic plan, risks, benefits, and alternatives have been provided, discussed and informed consent has been obtained with: patient.

## 2024-09-05 ENCOUNTER — TELEPHONE (OUTPATIENT)
Dept: GASTROENTEROLOGY | Facility: CLINIC | Age: 74
End: 2024-09-05
Payer: MEDICARE

## 2024-09-19 NOTE — PROGRESS NOTES
-- DO NOT REPLY / DO NOT REPLY ALL --  -- This inbox is not monitored. If this was sent to the wrong provider or department, reroute message to P [employer] pool. --  -- Message is from [facility] [employer] ([facility]) --    General Patient Message: Pt is calling regarding questions and concerns about recent order for US of kidneys and Bladder. Pt would like to speak to a nurse. Please follow up. Thank You   Caller Information       Contact Date/Time Type Contact Phone/Fax    09/19/2024 10:39 AM CDT Phone (Incoming) Alexander Lance (Self) [phone] (H)            Alternative phone number: [phone]    Can a detailed message be left? Yes - Voicemail   Patient has been advised the message will be addressed within 2-3 business days.                 CLINICAL PHARMACY NOTE: MEDS TO BEDS    Total # of Prescriptions Filled: 2   The following medications were delivered to the patient:  · Clopidogrel 75mg  · Atorvastatin 40mg     Additional Documentation:  The patients  paid with cash, the medications were handed to the patient in her room on 5th floor.

## (undated) DEVICE — THE CHANNEL CLEANING BRUSH IS A NYLON FLEXI BRUSH ATTACHED TO A FLEXIBLE PLASTIC SHEATH DESIGNED TO SAFELY REMOVE DEBRIS FROM FLEXIBLE ENDOSCOPES.

## (undated) DEVICE — SENSR O2 OXIMAX FNGR A/ 18IN NONSTR

## (undated) DEVICE — MASK,OXYGEN,MED CONC,ADLT,7' TUB, UC: Brand: PENDING

## (undated) DEVICE — Device: Brand: DEFENDO AIR/WATER/SUCTION AND BIOPSY VALVE

## (undated) DEVICE — SNAR POLYP CAPTIVATOR MICROHEX 13 240CM

## (undated) DEVICE — YANKAUER,BULB TIP WITH VENT: Brand: ARGYLE

## (undated) DEVICE — TBG SMPL FLTR LINE NASL 02/C02 A/ BX/100

## (undated) DEVICE — ENDOGATOR AUXILIARY WATER JET CONNECTOR: Brand: ENDOGATOR

## (undated) DEVICE — THE SINGLE USE ETRAP – POLYP TRAP IS USED FOR SUCTION RETRIEVAL OF ENDOSCOPICALLY REMOVED POLYPS.: Brand: ETRAP

## (undated) DEVICE — SNAR POLYP SENSATION MICRO OVL 13 240X40